# Patient Record
Sex: FEMALE | Race: WHITE | NOT HISPANIC OR LATINO | Employment: OTHER | URBAN - METROPOLITAN AREA
[De-identification: names, ages, dates, MRNs, and addresses within clinical notes are randomized per-mention and may not be internally consistent; named-entity substitution may affect disease eponyms.]

---

## 2021-02-12 ENCOUNTER — APPOINTMENT (OUTPATIENT)
Dept: CT IMAGING | Facility: HOSPITAL | Age: 76
End: 2021-02-12

## 2021-02-12 ENCOUNTER — APPOINTMENT (OUTPATIENT)
Dept: GENERAL RADIOLOGY | Facility: HOSPITAL | Age: 76
End: 2021-02-12

## 2021-02-12 ENCOUNTER — HOSPITAL ENCOUNTER (INPATIENT)
Facility: HOSPITAL | Age: 76
LOS: 6 days | Discharge: NURSING, REHAB OR LONG-TERM CARE | End: 2021-02-18
Attending: EMERGENCY MEDICINE | Admitting: INTERNAL MEDICINE

## 2021-02-12 DIAGNOSIS — S42.296D OTHER CLOSED NONDISPLACED FRACTURE OF PROXIMAL END OF HUMERUS WITH ROUTINE HEALING, UNSPECIFIED LATERALITY, SUBSEQUENT ENCOUNTER: ICD-10-CM

## 2021-02-12 DIAGNOSIS — S02.2XXA CLOSED FRACTURE OF NASAL BONE, INITIAL ENCOUNTER: ICD-10-CM
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
## 2021-02-12 DIAGNOSIS — S42.301A CLOSED FRACTURE OF BOTH HUMERI, INITIAL ENCOUNTER: Primary | ICD-10-CM

## 2021-02-12 DIAGNOSIS — S42.302A CLOSED FRACTURE OF BOTH HUMERI, INITIAL ENCOUNTER: Primary | ICD-10-CM

## 2021-02-12 PROBLEM — M25.552 LEFT HIP PAIN: Status: ACTIVE | Noted: 2021-02-12

## 2021-02-12 PROBLEM — M54.2 NECK PAIN: Status: ACTIVE | Noted: 2021-02-12

## 2021-02-12 PROBLEM — R56.9 SEIZURE: Status: ACTIVE | Noted: 2021-02-12

## 2021-02-12 PROBLEM — M41.25 IDIOPATHIC SCOLIOSIS OF THORACOLUMBAR SPINE: Status: ACTIVE | Noted: 2021-02-12

## 2021-02-12 PROBLEM — I10 ESSENTIAL HYPERTENSION: Status: ACTIVE | Noted: 2021-02-12

## 2021-02-12 PROBLEM — S42.309A HUMERUS FRACTURE: Status: ACTIVE | Noted: 2021-02-12

## 2021-02-12 LAB
ALBUMIN SERPL-MCNC: 4.3 G/DL (ref 3.5–5.2)
ALBUMIN/GLOB SERPL: 1.8 G/DL
ALP SERPL-CCNC: 47 U/L (ref 39–117)
ALT SERPL W P-5'-P-CCNC: 17 U/L (ref 1–33)
ANION GAP SERPL CALCULATED.3IONS-SCNC: 9 MMOL/L (ref 5–15)
AST SERPL-CCNC: 26 U/L (ref 1–32)
BASOPHILS # BLD AUTO: 0.04 10*3/MM3 (ref 0–0.2)
BASOPHILS NFR BLD AUTO: 0.3 % (ref 0–1.5)
BILIRUB SERPL-MCNC: 0.3 MG/DL (ref 0–1.2)
BUN SERPL-MCNC: 11 MG/DL (ref 8–23)
BUN/CREAT SERPL: 22 (ref 7–25)
CALCIUM SPEC-SCNC: 9.2 MG/DL (ref 8.6–10.5)
CHLORIDE SERPL-SCNC: 101 MMOL/L (ref 98–107)
CO2 SERPL-SCNC: 23 MMOL/L (ref 22–29)
CREAT SERPL-MCNC: 0.5 MG/DL (ref 0.57–1)
DEPRECATED RDW RBC AUTO: 40.3 FL (ref 37–54)
EOSINOPHIL # BLD AUTO: 0.03 10*3/MM3 (ref 0–0.4)
EOSINOPHIL NFR BLD AUTO: 0.2 % (ref 0.3–6.2)
ERYTHROCYTE [DISTWIDTH] IN BLOOD BY AUTOMATED COUNT: 12.1 % (ref 12.3–15.4)
GFR SERPL CREATININE-BSD FRML MDRD: 121 ML/MIN/1.73
GLOBULIN UR ELPH-MCNC: 2.4 GM/DL
GLUCOSE SERPL-MCNC: 118 MG/DL (ref 65–99)
HCT VFR BLD AUTO: 35.5 % (ref 34–46.6)
HGB BLD-MCNC: 12.1 G/DL (ref 12–15.9)
IMM GRANULOCYTES # BLD AUTO: 0.06 10*3/MM3 (ref 0–0.05)
IMM GRANULOCYTES NFR BLD AUTO: 0.4 % (ref 0–0.5)
LYMPHOCYTES # BLD AUTO: 1.03 10*3/MM3 (ref 0.7–3.1)
LYMPHOCYTES NFR BLD AUTO: 6.5 % (ref 19.6–45.3)
MCH RBC QN AUTO: 31 PG (ref 26.6–33)
MCHC RBC AUTO-ENTMCNC: 34.1 G/DL (ref 31.5–35.7)
MCV RBC AUTO: 91 FL (ref 79–97)
MONOCYTES # BLD AUTO: 0.84 10*3/MM3 (ref 0.1–0.9)
MONOCYTES NFR BLD AUTO: 5.3 % (ref 5–12)
NEUTROPHILS NFR BLD AUTO: 13.84 10*3/MM3 (ref 1.7–7)
NEUTROPHILS NFR BLD AUTO: 87.3 % (ref 42.7–76)
NRBC BLD AUTO-RTO: 0 /100 WBC (ref 0–0.2)
PLATELET # BLD AUTO: 248 10*3/MM3 (ref 140–450)
PMV BLD AUTO: 9.6 FL (ref 6–12)
POTASSIUM SERPL-SCNC: 4.2 MMOL/L (ref 3.5–5.2)
PROT SERPL-MCNC: 6.7 G/DL (ref 6–8.5)
RBC # BLD AUTO: 3.9 10*6/MM3 (ref 3.77–5.28)
SODIUM SERPL-SCNC: 133 MMOL/L (ref 136–145)
WBC # BLD AUTO: 15.84 10*3/MM3 (ref 3.4–10.8)

## 2021-02-12 PROCEDURE — 73030 X-RAY EXAM OF SHOULDER: CPT

## 2021-02-12 PROCEDURE — 87636 SARSCOV2 & INF A&B AMP PRB: CPT | Performed by: INTERNAL MEDICINE

## 2021-02-12 PROCEDURE — 99222 1ST HOSP IP/OBS MODERATE 55: CPT | Performed by: INTERNAL MEDICINE

## 2021-02-12 PROCEDURE — 72125 CT NECK SPINE W/O DYE: CPT

## 2021-02-12 PROCEDURE — 25010000002 HYDROMORPHONE PER 4 MG: Performed by: EMERGENCY MEDICINE

## 2021-02-12 PROCEDURE — 85025 COMPLETE CBC W/AUTO DIFF WBC: CPT | Performed by: EMERGENCY MEDICINE

## 2021-02-12 PROCEDURE — 80053 COMPREHEN METABOLIC PANEL: CPT | Performed by: EMERGENCY MEDICINE

## 2021-02-12 PROCEDURE — 25010000002 ONDANSETRON PER 1 MG: Performed by: EMERGENCY MEDICINE

## 2021-02-12 PROCEDURE — 73060 X-RAY EXAM OF HUMERUS: CPT

## 2021-02-12 PROCEDURE — 70450 CT HEAD/BRAIN W/O DYE: CPT

## 2021-02-12 PROCEDURE — 99285 EMERGENCY DEPT VISIT HI MDM: CPT

## 2021-02-12 RX ORDER — HYDROCODONE BITARTRATE AND ACETAMINOPHEN 5; 325 MG/1; MG/1
1 TABLET ORAL EVERY 4 HOURS PRN
Status: DISCONTINUED | OUTPATIENT
Start: 2021-02-12 | End: 2021-02-14

## 2021-02-12 RX ORDER — HYDROMORPHONE HYDROCHLORIDE 1 MG/ML
0.25 INJECTION, SOLUTION INTRAMUSCULAR; INTRAVENOUS; SUBCUTANEOUS
Status: DISCONTINUED | OUTPATIENT
Start: 2021-02-12 | End: 2021-02-14

## 2021-02-12 RX ORDER — LEVETIRACETAM 750 MG/1
1500 TABLET ORAL EVERY MORNING
Status: DISCONTINUED | OUTPATIENT
Start: 2021-02-13 | End: 2021-02-18 | Stop reason: HOSPADM

## 2021-02-12 RX ORDER — HYDROMORPHONE HYDROCHLORIDE 1 MG/ML
0.5 INJECTION, SOLUTION INTRAMUSCULAR; INTRAVENOUS; SUBCUTANEOUS ONCE
Status: COMPLETED | OUTPATIENT
Start: 2021-02-12 | End: 2021-02-12

## 2021-02-12 RX ORDER — ONDANSETRON 2 MG/ML
4 INJECTION INTRAMUSCULAR; INTRAVENOUS ONCE
Status: COMPLETED | OUTPATIENT
Start: 2021-02-12 | End: 2021-02-12

## 2021-02-12 RX ORDER — HYDROMORPHONE HYDROCHLORIDE 1 MG/ML
0.25 INJECTION, SOLUTION INTRAMUSCULAR; INTRAVENOUS; SUBCUTANEOUS ONCE
Status: COMPLETED | OUTPATIENT
Start: 2021-02-12 | End: 2021-02-12

## 2021-02-12 RX ORDER — LEVETIRACETAM 500 MG/1
1000 TABLET ORAL NIGHTLY
Status: DISCONTINUED | OUTPATIENT
Start: 2021-02-12 | End: 2021-02-18 | Stop reason: HOSPADM

## 2021-02-12 RX ADMIN — MUPIROCIN: 20 OINTMENT TOPICAL at 21:45

## 2021-02-12 RX ADMIN — LEVETIRACETAM 1000 MG: 500 TABLET, FILM COATED ORAL at 22:43

## 2021-02-12 RX ADMIN — HYDROMORPHONE HYDROCHLORIDE 0.5 MG: 1 INJECTION, SOLUTION INTRAMUSCULAR; INTRAVENOUS; SUBCUTANEOUS at 19:52

## 2021-02-12 RX ADMIN — HYDROCODONE BITARTRATE AND ACETAMINOPHEN 1 TABLET: 5; 325 TABLET ORAL at 22:47

## 2021-02-12 RX ADMIN — HYDROMORPHONE HYDROCHLORIDE 0.25 MG: 1 INJECTION, SOLUTION INTRAMUSCULAR; INTRAVENOUS; SUBCUTANEOUS at 18:02

## 2021-02-12 RX ADMIN — ONDANSETRON 4 MG: 2 INJECTION INTRAMUSCULAR; INTRAVENOUS at 18:02

## 2021-02-12 NOTE — ED PROVIDER NOTES
EMERGENCY DEPARTMENT ENCOUNTER    Room Number:  Room/bed info not found  Date of encounter:  2/12/2021  PCP: No primary care provider on file.  Historian: Patient      HPI:  Chief Complaint: Head injury, bilateral shoulder injury        Context: Keely Haider is a 75 y.o. female who presents to the ED c/o slip and fall onto outstretched hands.  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  She slipped and fell forward breaking her fall with her hands extended.  She struck her face on the ground and feels significant pain in both shoulders left much greater than right.  She rates her shoulder pain at 10 out of 10 on the pain scale.  She had no loss of consciousness.  She denies vertigo.  She has not taken any medications for symptom relief prior to coming emergency department.  Patient is uncertain about taking blood thinners.      PAST MEDICAL HISTORY  Active Ambulatory Problems     Diagnosis Date Noted   • No Active Ambulatory Problems     Resolved Ambulatory Problems     Diagnosis Date Noted   • No Resolved Ambulatory Problems     No Additional Past Medical History         PAST SURGICAL HISTORY  No past surgical history on file.      FAMILY HISTORY  No family history on file.      SOCIAL HISTORY         ALLERGIES  Patient has no allergy information on record.        REVIEW OF SYSTEMS  Review of Systems     All systems reviewed and negative except for those discussed in HPI.       PHYSICAL EXAM    I have reviewed the triage vital signs and nursing notes.    ED Triage Vitals   Temp Pulse Resp BP SpO2   -- -- -- -- --      Temp src Heart Rate Source Patient Position BP Location FiO2 (%)   -- -- -- -- --       Physical Exam  GENERAL:   Appears uncomfortable as she arrives by EMS where I greet her.  HENT: Nares patent.  Small facial contusions and abrasions right superior periorbital region, mid forehead.  No craniofacial instability.  EYES: No scleral icterus.   Bert  CV: Regular rhythm, regular rate.  No murmurs gallops rubs  RESPIRATORY: Normal effort.  No audible wheezes, rales or rhonchi.  Clear to auscultation  ABDOMEN: Soft, nontender.  MUSCULOSKELETAL: Marked tenderness to palpation in the proximal left humerus.  The patient does not tolerate any range of motion of this shoulder.  Pain extends to mid humerus.  NEURO: Alert, moves all extremities, follows commands.  SKIN: Warm, dry, no rash visualized.        LAB RESULTS  No results found for this or any previous visit (from the past 24 hour(s)).    If labs were ordered, I independently reviewed the results.        RADIOLOGY  No Radiology Exams Resulted Within Past 24 Hours    I ordered and reviewed the above noted radiographic studies.    I viewed images of bilat shoulders which showed bilat fractures  per my independent interpretation.  See radiologist's dictation for official interpretation.        PROCEDURES    Procedures    No orders to display       MEDICATIONS GIVEN IN ER    Medications - No data to display      PROGRESS, DATA ANALYSIS, CONSULTS, AND MEDICAL DECISION MAKING    All labs have been independently reviewed by me.  All radiology studies have been reviewed by me and the radiologist dictating the report.   EKG's have been independently viewed and interpreted by me.          ED Course as of Feb 13 1402   Fri Feb 12, 2021   2113 I treated the patient with hydrocodone x2 doses.  I have asked for bilateral slings to be placed.  I spoke with Dr. Martines, on-call orthopedics.  He agrees with admission to the hospitalist service for further pain treatment.    Have paged the hospitalist.    [MS]      ED Course User Index  [MS] Cristobal Silva MD             AS OF 17:46 EST VITALS:    BP -    HR -    TEMP -    O2 SATS -          DIAGNOSIS  Final diagnoses:   Closed fracture of both humeri, initial encounter   Closed fracture of nasal bone, initial encounter   Fall due to slipping on ice or snow, initial  encounter         DISPOSITION  Admission           Cristobal Silva MD  02/13/21 0669

## 2021-02-13 ENCOUNTER — ANESTHESIA EVENT (OUTPATIENT)
Dept: PERIOP | Facility: HOSPITAL | Age: 76
End: 2021-02-13

## 2021-02-13 ENCOUNTER — APPOINTMENT (OUTPATIENT)
Dept: CT IMAGING | Facility: HOSPITAL | Age: 76
End: 2021-02-13

## 2021-02-13 LAB
BACTERIA UR QL AUTO: ABNORMAL /HPF
BILIRUB UR QL STRIP: NEGATIVE
CLARITY UR: ABNORMAL
COLOR UR: YELLOW
FLUAV RNA RESP QL NAA+PROBE: NOT DETECTED
FLUBV RNA RESP QL NAA+PROBE: NOT DETECTED
GLUCOSE UR STRIP-MCNC: NEGATIVE MG/DL
HGB UR QL STRIP.AUTO: ABNORMAL
HYALINE CASTS UR QL AUTO: ABNORMAL /LPF
KETONES UR QL STRIP: NEGATIVE
LEUKOCYTE ESTERASE UR QL STRIP.AUTO: ABNORMAL
NITRITE UR QL STRIP: POSITIVE
PH UR STRIP.AUTO: 7.5 [PH] (ref 5–8)
PROT UR QL STRIP: NEGATIVE
RBC # UR: ABNORMAL /HPF
REF LAB TEST METHOD: ABNORMAL
SARS-COV-2 RNA RESP QL NAA+PROBE: NOT DETECTED
SP GR UR STRIP: 1.01 (ref 1–1.03)
SQUAMOUS #/AREA URNS HPF: ABNORMAL /HPF
UROBILINOGEN UR QL STRIP: ABNORMAL
WBC UR QL AUTO: ABNORMAL /HPF

## 2021-02-13 PROCEDURE — 99232 SBSQ HOSP IP/OBS MODERATE 35: CPT | Performed by: FAMILY MEDICINE

## 2021-02-13 PROCEDURE — 87186 SC STD MICRODIL/AGAR DIL: CPT | Performed by: FAMILY MEDICINE

## 2021-02-13 PROCEDURE — 94799 UNLISTED PULMONARY SVC/PX: CPT

## 2021-02-13 PROCEDURE — 87086 URINE CULTURE/COLONY COUNT: CPT | Performed by: FAMILY MEDICINE

## 2021-02-13 PROCEDURE — 87077 CULTURE AEROBIC IDENTIFY: CPT | Performed by: FAMILY MEDICINE

## 2021-02-13 PROCEDURE — 25010000002 HYDROMORPHONE PER 4 MG: Performed by: INTERNAL MEDICINE

## 2021-02-13 PROCEDURE — 93005 ELECTROCARDIOGRAM TRACING: CPT | Performed by: INTERNAL MEDICINE

## 2021-02-13 PROCEDURE — 81001 URINALYSIS AUTO W/SCOPE: CPT | Performed by: FAMILY MEDICINE

## 2021-02-13 PROCEDURE — 93010 ELECTROCARDIOGRAM REPORT: CPT | Performed by: INTERNAL MEDICINE

## 2021-02-13 PROCEDURE — G0378 HOSPITAL OBSERVATION PER HR: HCPCS

## 2021-02-13 PROCEDURE — 73200 CT UPPER EXTREMITY W/O DYE: CPT

## 2021-02-13 RX ORDER — LEVETIRACETAM 500 MG/1
1000 TABLET ORAL DAILY
COMMUNITY

## 2021-02-13 RX ORDER — GABAPENTIN 300 MG/1
300 CAPSULE ORAL
Status: DISCONTINUED | OUTPATIENT
Start: 2021-02-14 | End: 2021-02-18 | Stop reason: HOSPADM

## 2021-02-13 RX ORDER — GABAPENTIN 300 MG/1
300 CAPSULE ORAL
Status: ON HOLD | COMMUNITY
End: 2021-02-18 | Stop reason: SDUPTHER

## 2021-02-13 RX ORDER — POLYETHYLENE GLYCOL 3350 17 G/17G
17 POWDER, FOR SOLUTION ORAL DAILY
Status: DISCONTINUED | OUTPATIENT
Start: 2021-02-13 | End: 2021-02-18 | Stop reason: HOSPADM

## 2021-02-13 RX ORDER — LEVETIRACETAM 500 MG/1
1500 TABLET ORAL NIGHTLY
COMMUNITY

## 2021-02-13 RX ORDER — DEXTROSE, SODIUM CHLORIDE, AND POTASSIUM CHLORIDE 5; .45; .15 G/100ML; G/100ML; G/100ML
50 INJECTION INTRAVENOUS CONTINUOUS
Status: DISCONTINUED | OUTPATIENT
Start: 2021-02-13 | End: 2021-02-14

## 2021-02-13 RX ORDER — ACETAMINOPHEN 325 MG/1
650 TABLET ORAL EVERY 6 HOURS PRN
Status: DISCONTINUED | OUTPATIENT
Start: 2021-02-13 | End: 2021-02-14 | Stop reason: SDUPTHER

## 2021-02-13 RX ORDER — AMLODIPINE BESYLATE 10 MG/1
10 TABLET ORAL DAILY
COMMUNITY

## 2021-02-13 RX ORDER — AMLODIPINE BESYLATE 10 MG/1
10 TABLET ORAL DAILY
Status: DISCONTINUED | OUTPATIENT
Start: 2021-02-13 | End: 2021-02-18 | Stop reason: HOSPADM

## 2021-02-13 RX ORDER — ONDANSETRON 2 MG/ML
4 INJECTION INTRAMUSCULAR; INTRAVENOUS EVERY 6 HOURS PRN
Status: DISCONTINUED | OUTPATIENT
Start: 2021-02-13 | End: 2021-02-14

## 2021-02-13 RX ORDER — METOPROLOL TARTRATE 5 MG/5ML
2.5 INJECTION INTRAVENOUS ONCE
Status: COMPLETED | OUTPATIENT
Start: 2021-02-13 | End: 2021-02-13

## 2021-02-13 RX ORDER — LISINOPRIL 20 MG/1
20 TABLET ORAL
Status: DISCONTINUED | OUTPATIENT
Start: 2021-02-14 | End: 2021-02-14

## 2021-02-13 RX ADMIN — LEVETIRACETAM 1500 MG: 750 TABLET, FILM COATED ORAL at 10:14

## 2021-02-13 RX ADMIN — POTASSIUM CHLORIDE, DEXTROSE MONOHYDRATE AND SODIUM CHLORIDE 50 ML/HR: 150; 5; 450 INJECTION, SOLUTION INTRAVENOUS at 22:17

## 2021-02-13 RX ADMIN — POLYETHYLENE GLYCOL 3350 17 G: 17 POWDER, FOR SOLUTION ORAL at 10:14

## 2021-02-13 RX ADMIN — HYDROCODONE BITARTRATE AND ACETAMINOPHEN 1 TABLET: 5; 325 TABLET ORAL at 16:35

## 2021-02-13 RX ADMIN — METOPROLOL TARTRATE 2.5 MG: 5 INJECTION INTRAVENOUS at 20:19

## 2021-02-13 RX ADMIN — HYDROMORPHONE HYDROCHLORIDE 0.25 MG: 1 INJECTION, SOLUTION INTRAMUSCULAR; INTRAVENOUS; SUBCUTANEOUS at 05:43

## 2021-02-13 RX ADMIN — AMLODIPINE BESYLATE 10 MG: 10 TABLET ORAL at 16:45

## 2021-02-13 RX ADMIN — POTASSIUM CHLORIDE, DEXTROSE MONOHYDRATE AND SODIUM CHLORIDE 50 ML/HR: 150; 5; 450 INJECTION, SOLUTION INTRAVENOUS at 01:25

## 2021-02-13 RX ADMIN — HYDROMORPHONE HYDROCHLORIDE 0.25 MG: 1 INJECTION, SOLUTION INTRAMUSCULAR; INTRAVENOUS; SUBCUTANEOUS at 01:19

## 2021-02-13 RX ADMIN — LEVETIRACETAM 1000 MG: 500 TABLET, FILM COATED ORAL at 20:19

## 2021-02-13 RX ADMIN — HYDROCODONE BITARTRATE AND ACETAMINOPHEN 1 TABLET: 5; 325 TABLET ORAL at 10:14

## 2021-02-13 NOTE — H&P
[facility] Medicine Services  HISTORY AND PHYSICAL    Patient Name: Keely Haider  : 1945  MRN: [id number]  Primary Care Physician: Provider, No Known    Subjective   Subjective     Chief Complaint:fall with broken arms    HPI:  Keely Haider is a 75 y.o. female with  has a past medical history of Hypertension, Osteoarthritis of left hip, Osteoporosis, Scoliosis, and Seizures (CMS/HCC).  Who went to visit her son's birthdays on  and delivered [employer] cards for is her grandchildren tonight.  Patient could not get him to answer the front door so she went to the basement.  Where she fell and to prevent hitting her head caught herself with her arms and broke both humeri.  She is being admitted for pain control and surgical evaluation.  Patient also had gel to her neck as well as contusion of her right eye.  Otherwise she has no specific complaints other than significant worry for her .  Review of Systems   Patient reports chronic left hip pain that makes it difficult for her to walk, chronic scoliosis, she denies chest pain heaviness tightness, shortness of breath fever chills or Covid symptoms.  Otherwise complete 10-system ROS is negative except as mentioned in the HPI.    Personal History     Past Medical History:   Diagnosis Date   • Hypertension    • Seizures (CMS/HCC)        No past surgical history on file.    Family History: family history includes Lung cancer in her father.     Social History:  reports that she has never smoked. She does not have any smokeless tobacco history on file. She reports that she does not drink alcohol or use drugs.  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  She just recently moved back from [place] to [place] where she is living in a patio home with her third  who is 10 years older than she is.  He has some cognitive  impairment as well as he does not drive therefore she has significant concerns about leaving him alone.    Medications: Currently unknown although she does report she takes Keppra she reports that she has seen Dr. SWENSON for this in the past however I do not see it in the computer.        Allergies   Allergen Reactions   • Sulfa Antibiotics Unknown - Low Severity       Objective   Objective     Vital Signs:   Temp:  [98 °F (36.7 °C)] 98 °F (36.7 °C)  Heart Rate:  [67-72] 72  Resp:  [16] 16  BP: (146-173)/(68-74) 147/68  Stable on room air  Patient is alert and talkative in no distress at rest, she is pleasant and quite talkative   She can now move her hands and on her arms, she has a contusion on her right orbital area neck is without mass or JVD  Heart is Reg wo murmur  Lungs are clear wo wheeze or crackle  Abd is soft without HSM or mass, not tender or distended  Her legs are strong, she has chronic osteoarthritic changes  Skin is without rash  Neurologic exam is nonfocal   Mood is appropriate      Result Review:  I have personally reviewed the results from the time of admission and agree with these findings:  [x]  Laboratory  [x]  Radiology  [x]  EKG/Telemetry   []  Pathology  []  Old records  []  Other:  Most notable findings include: Bilateral humeral fractures, elevated white count, otherwise normal, no EKG done yet    LAB RESULTS:      Lab 02/12/21 2036   WBC 15.84*   HEMOGLOBIN 12.1   HEMATOCRIT 35.5   PLATELETS 248   NEUTROS ABS 13.84*   IMMATURE GRANS (ABS) 0.06*   LYMPHS ABS 1.03   MONOS ABS 0.84   EOS ABS 0.03   MCV 91.0         Lab 02/12/21 2036   SODIUM 133*   POTASSIUM 4.2   CHLORIDE 101   CO2 23.0   ANION GAP 9.0   BUN 11   CREATININE 0.50*   GLUCOSE 118*   CALCIUM 9.2         Lab 02/12/21 2036   TOTAL PROTEIN 6.7   ALBUMIN 4.30   GLOBULIN 2.4   ALT (SGPT) 17   AST (SGOT) 26   BILIRUBIN 0.3   ALK PHOS 47                     Brief Urine Lab Results     None        Microbiology Results (last 10 days)      ** No results found for the last 240 hours. **          Xr Shoulder 2+ View Right    Result Date: 2/12/2021  CR Shoulder Comp Min 2 Vws RT INDICATION: Pain in the shoulder and humerus. Fell down the steps today. COMPARISON: None available. FINDINGS: 3 views of the right shoulder.  The bones are osteoporotic. There is a comminuted impacted fracture that appears to be at the level of the surgical neck of the humerus. Impaction on the order of at least 2 cm. Mild subluxation but no distinct shoulder dislocation. Degenerative changes of the AC joint and glenohumeral joint with probable undersurface spurring of the acromion versus underlying intra-articular loose body. Bony glenoid appears intact. No foreign body.     Impression: 1. Comminuted and impacted fracture of the proximal humerus and humeral head. Signer Name: Huber Lynch MD  Signed: 2/12/2021 7:29 PM  Workstation Name: [facility]  [facility]    Xr Humerus Left    Result Date: 2/12/2021  EXAMINATION: XR LEFT HUMERUS - 02/12/2021  INDICATION: Fall. Right and left shoulder pain.  COMPARISON: None.  FINDINGS: There is a surgical neck fracture of the proximal humerus with mild impaction, and avulsion of the greater tuberosity. No underlying pathologic lesion is seen. The bones appear generally osteopenic. Remaining bony structures appear to be intact.      Impression: Acute surgical neck fracture of the left proximal humerus as described.  DICTATED:   02/12/2021 EDITED/ls :   02/12/2021        Ct Head Without Contrast    Result Date: 2/12/2021  CT Head WO HISTORY: Status post fall today. Complains of laceration to nose and above right eye. TECHNIQUE: Axial unenhanced head CT. Radiation dose reduction techniques included automated exposure control or exposure modulation based on body size. Count of known CT and cardiac nuc med studies performed in previous 12 months: 0. Time of scan: 1908 hours COMPARISON: None. FINDINGS: No  intracranial hemorrhage, mass, or infarct. No hydrocephalus or extra-axial fluid collection. There are senescent changes, including volume loss and nonspecific white matter change, but no acute abnormality is seen. The skull base, calvarium, and extracranial soft tissues are normal. Small amount of soft tissue gas right nasal soft tissues may reflect laceration.     Impression: Senescent changes without acute abnormality. Suspected small right nasal soft tissue laceration. Signer Name: GAY Bay MD  Signed: 2/12/2021 7:25 PM  Workstation Name: [facility]    Ct Cervical Spine Without Contrast    Result Date: 2/12/2021  CT Spine Cervical WO INDICATION: Neck pain after fall today. Pain across neck and shoulders. TECHNIQUE: CT of the cervical spine without IV contrast. Coronal and sagittal reconstructions were obtained.  Radiation dose reduction techniques included automated exposure control or exposure modulation based on body size. Count of known CT and cardiac nuc med studies performed in previous 12 months: 0. COMPARISON: None available. FINDINGS: Moderately advanced arthritic changes atlantoaxial joint with erosions. Moderately advanced C5-C6 and C6-7 degenerative disc disease. No acute fracture or malalignment. Mild C5-C6 spinal stenosis and right foraminal stenosis. Mild C6-C7 spinal stenosis and right greater than left foraminal stenosis. Paravertebral soft tissues unremarkable.     Impression: No acute traumatic findings. Moderately advanced degenerative disc disease with spinal and foraminal stenosis as detailed. Signer Name: GAY Bay MD  Signed: 2/12/2021 7:28 PM  Workstation Name: [facility]           Current COVID Risks are:  [] Fever []  Cough [] Shortness of breath [] Change in taste or smell  [] Exposure to COVID patient  [] High risk facility   [x]  NONE Covid test negative 02/13/21    Assessment/Plan    Assessment / Plan       Humerus fracture-bilateral    Idiopathic scoliosis of thoracolumbar spine    Left hip pain    Essential hypertension    Seizure (CMS/HCC)    Neck pain      Assessment & Plan:  75-year-old woman with past medical history only significant for hypertension, osteoarthritis osteoporosis, scoliosis, chronic left hip pain as well as a seizure disorder who had a fall on the ice tonight and broke both of her humeri.    Bilateral humeral fracture  -ED spoke with orthopedics who will evaluate the patient in the morning for possible surgery  -Splints are available in the ED room and hopefully will be applied  -PT OT consults  -Patient admits that she is not safe at home by her self and does not have sufficient support at home  -EKG for preop clearance    Seizure disorder  -Continue Keppra, need to review home medications      Hypertension-hold meds for now hopefully pain control will help    Chronic osteoarthritis and osteoporosis with left hip pain may have contributed to her fall and fractures--maximize treatment 1 once we know what she is currently taking.      DVT prophylaxis: On hold for possible surgery    CODE STATUS: Full code    Admission Status: INPATIENT status due to need for pain control, neurologic stability.  I feel patient’s risk for adverse outcomes and need for care warrant INPATIENT evaluation and I predict the patient’s care encounter to likely last beyond 2 midnights.        Electronically signed by Sakshi Lane MD 02/12/21 21:42 EST

## 2021-02-13 NOTE — PROGRESS NOTES
[facility] Medicine Services  PROGRESS NOTE    Patient Name: Keely Haider  : 1945  MRN: [id number]    Date of Admission: 2021  Primary Care Physician: Provider, No Known    Subjective   Subjective     CC:  F/U bilateral humerus fractures     HPI:  Pt seen and examined. RN notes reviewed. No acute events overnight. Pt doing well this morning. Has some pain in her shoulders. Pt is very pleasant.     ROS:  Gen- No fevers, chills  CV- No chest pain, palpitations  Resp- No cough, dyspnea  GI- No N/V/D, abd pain    Objective   Objective     Vital Signs:   Temp:  [98 °F (36.7 °C)-98.4 °F (36.9 °C)] 98.4 °F (36.9 °C)  Heart Rate:  [64-80] 76  Resp:  [16-18] 18  BP: (143-173)/(57-90) 146/58        Physical Exam:  Constitutional: No acute distress, awake, alert  HENT: NC, mucous membranes moist, bandage over R eye   Respiratory: Clear to auscultation bilaterally, respiratory effort normal   Cardiovascular: RRR, no murmurs, rubs, or gallops  Gastrointestinal: Positive bowel sounds, soft, nontender, nondistended  Musculoskeletal: No bilateral ankle edema, B/L UE without obvious deformity, sensation/strength intact   Psychiatric: Appropriate affect, cooperative  Neurologic: Oriented x 3, no focal deficits   Skin: No rashes    Results Reviewed:  Results from last 7 days   Lab Units 21   WBC 10*3/mm3 15.84*   HEMOGLOBIN g/dL 12.1   HEMATOCRIT % 35.5   PLATELETS 10*3/mm3 248     Results from last 7 days   Lab Units 21   SODIUM mmol/L 133*   POTASSIUM mmol/L 4.2   CHLORIDE mmol/L 101   CO2 mmol/L 23.0   BUN mg/dL 11   CREATININE mg/dL 0.50*   GLUCOSE mg/dL 118*   CALCIUM mg/dL 9.2   ALT (SGPT) U/L 17   AST (SGOT) U/L 26     Estimated Creatinine Clearance: 51.9 mL/min (A) (by C-G formula based on SCr of 0.5 mg/dL (L)).    Microbiology Results Abnormal     Procedure Component Value - Date/Time    COVID PRE-OP / PRE-PROCEDURE SCREENING ORDER (NO ISOLATION) - Swab,  Nasopharynx [[id number]]  (Normal) Collected: 02/12/21 2352    Lab Status: Final result Specimen: Swab from Nasopharynx Updated: 02/13/21 0404    Narrative:      The following orders were created for panel order COVID PRE-OP / PRE-PROCEDURE SCREENING ORDER (NO ISOLATION) - Swab, Nasopharynx.  Procedure                               Abnormality         Status                     ---------                               -----------         ------                     COVID-19 and FLU A/B PCR...[[id number]]  Normal              Final result                 Please view results for these tests on the individual orders.    COVID-19 and FLU A/B PCR - Swab, Nasopharynx [[id number]]  (Normal) Collected: 02/12/21 2352    Lab Status: Final result Specimen: Swab from Nasopharynx Updated: 02/13/21 0404     COVID19 Not Detected     Influenza A PCR Not Detected     Influenza B PCR Not Detected    Narrative:      Fact sheet for providers: https://www.fda.gov/media/280888/download    Fact sheet for patients: https://www.fda.gov/media/528298/download    Test performed by PCR.          Imaging Results (Last 24 Hours)     Procedure Component Value Units Date/Time    CT Upper Extremity Left Without Contrast [[id number]] Resulted: 02/13/21 0917     Updated: 02/13/21 0921    CT Upper Extremity Right Without Contrast [[id number]] Resulted: 02/13/21 0917     Updated: 02/13/21 0921    XR Humerus Left [[id number]] Collected: 02/12/21 1913     Updated: 02/12/21 2209    Narrative:      EXAMINATION: XR LEFT HUMERUS - 02/12/2021     INDICATION: Fall. Right and left shoulder pain.     COMPARISON: None.     FINDINGS: There is a surgical neck fracture of the proximal humerus with  mild impaction, and avulsion of the greater tuberosity. No underlying  pathologic lesion is seen. The bones appear generally osteopenic.  Remaining bony structures appear to be intact.       Impression:      Acute surgical neck fracture of the left proximal humerus  as  described.     DICTATED:   02/12/2021  EDITED/ls :   02/12/2021         This report was finalized on 2/12/2021 10:06 PM by Dr. Eagle Davis MD.       XR Shoulder 2+ View Right [[id number]] Collected: 02/12/21 1929     Updated: 02/12/21 1931    Narrative:      CR Shoulder Comp Min 2 Vws RT    INDICATION:   Pain in the shoulder and humerus. Fell down the steps today.    COMPARISON:   None available.    FINDINGS:   3 views of the right shoulder.  The bones are osteoporotic. There is a comminuted impacted fracture that appears to be at the level of the surgical neck of the humerus. Impaction on the order of at least 2 cm. Mild subluxation but no distinct shoulder  dislocation. Degenerative changes of the AC joint and glenohumeral joint with probable undersurface spurring of the acromion versus underlying intra-articular loose body. Bony glenoid appears intact. No foreign body.      Impression:        1. Comminuted and impacted fracture of the proximal humerus and humeral head.    Signer Name: Huber Lynch MD   Signed: 2/12/2021 7:29 PM   Workstation Name: [facility]    [facility]    CT Cervical Spine Without Contrast [[id number]] Collected: 02/12/21 1928     Updated: 02/12/21 1930    Narrative:      CT Spine Cervical WO    INDICATION:   Neck pain after fall today. Pain across neck and shoulders.    TECHNIQUE:   CT of the cervical spine without IV contrast. Coronal and sagittal reconstructions were obtained.  Radiation dose reduction techniques included automated exposure control or exposure modulation based on body size. Count of known CT and cardiac nuc med  studies performed in previous 12 months: 0.     COMPARISON:  None available.    FINDINGS:  Moderately advanced arthritic changes atlantoaxial joint with erosions. Moderately advanced C5-C6 and C6-7 degenerative disc disease. No acute fracture or malalignment. Mild C5-C6 spinal stenosis and right foraminal stenosis. Mild C6-C7 spinal  stenosis  and right greater than left foraminal stenosis. Paravertebral soft tissues unremarkable.      Impression:      No acute traumatic findings. Moderately advanced degenerative disc disease with spinal and foraminal stenosis as detailed.    Signer Name: GAY Bay MD   Signed: 2/12/2021 7:28 PM   Workstation Name: [facility]    Radiology [facility]    CT Head Without Contrast [[id number]] Collected: 02/12/21 1925     Updated: 02/12/21 1927    Narrative:      CT Head WO    HISTORY:   Status post fall today. Complains of laceration to nose and above right eye.    TECHNIQUE:   Axial unenhanced head CT. Radiation dose reduction techniques included automated exposure control or exposure modulation based on body size. Count of known CT and cardiac nuc med studies performed in previous 12 months: 0.     Time of scan: 1908 hours    COMPARISON:   None.    FINDINGS:   No intracranial hemorrhage, mass, or infarct. No hydrocephalus or extra-axial fluid collection. There are senescent changes, including volume loss and nonspecific white matter change, but no acute abnormality is seen. The skull base, calvarium, and  extracranial soft tissues are normal. Small amount of soft tissue gas right nasal soft tissues may reflect laceration.      Impression:      Senescent changes without acute abnormality.    Suspected small right nasal soft tissue laceration.          Signer Name: GAY Bay MD   Signed: 2/12/2021 7:25 PM   Workstation Name: [facility]    [facility]               I have reviewed the medications:  Scheduled Meds:levETIRAcetam, 1,000 mg, Oral, Nightly  levETIRAcetam, 1,500 mg, Oral, QAM  polyethylene glycol, 17 g, Oral, Daily      Continuous Infusions:dextrose 5 % and sodium chloride 0.45 % with KCl 20 mEq/L, 50 mL/hr, Last Rate: 50 mL/hr (02/13/21 0125)      PRN Meds:.HYDROcodone-acetaminophen  •  HYDROmorphone    Assessment/Plan   Assessment & Plan      Active Hospital Problems    Diagnosis  POA   • Humerus fracture-bilateral [S42.309A]  Yes   • Idiopathic scoliosis of thoracolumbar spine [M41.25]  Yes   • Left hip pain [M25.552]  Yes   • Essential hypertension [I10]  Yes   • Seizure (CMS/HCC) [R56.9]  Yes   • Neck pain [M54.2]  Yes      Resolved Hospital Problems   No resolved problems to display.        Brief Hospital Course to date:  Keely Haider is a 75 y.o. female with past medical history only significant for hypertension, osteoarthritis osteoporosis, scoliosis, chronic left hip pain as well as a seizure disorder who had a fall on the ice and broke both of her humeri.     Bilateral humeral fracture  -Dr. Loza following. NPO at midnight with possible intervention tomorrow  -Plan for B/L simple slings today for comfort  -B/L CT UE with 3-D reconstruction today     Seizure disorder  -Continue Keppra     Hypertension  -BP stable    Hyponatremia  -mild, monitor with BMP in AM    Leukocytosis  -likely reactive from Fx  -Obtain UA  -CBC in AM     Chronic osteoarthritis and osteoporosis         DVT prophylaxis: Mechanical due to surgery    Disposition: I expect the patient to be discharged TBD.    CODE STATUS:   Code Status and Medical Interventions:   Ordered at: 02/12/21 2159     Code Status:    CPR     Medical Interventions (Level of Support Prior to Arrest):    Full       Trang Jean DO  02/13/21

## 2021-02-13 NOTE — PLAN OF CARE
Goal Outcome Evaluation:  Patient arrived to floor around 10:30 with bilateral humerus fractures. VSS throughout shift. Pt placed on 2L oxygen via nasal cannula after Dilaudid administration at 1:30. Pt rested well throughout night otherwise. Pt voiding on bedpan. LBM 2/14. Pt alert and oriented X 4. NPO @ midnight. Positioning and self care deficits due to nature of injury.

## 2021-02-13 NOTE — PROGRESS NOTES
See prior note for detailed regarding LEFT PROXIMAL HUMERUS FRACTURE      In regards to her RIGHT PROXIMAL HUMERUS FRACTURE, I have reviewed CT scan and patient will be treated in closed fashion with sling immobnilization

## 2021-02-13 NOTE — PLAN OF CARE
Goal Outcome Evaluation:  Plan of Care Reviewed With: patient  Progress: no change   Pt alert and oriented; voiding well; urinalysis sent to lab; norco given for pain x2; scds worn; plan is for surgery in am-npo after midnight. BP is a little elevated-Dr Jean notified-resumed home norvasc. Bilateral arms in slings for comfort.

## 2021-02-13 NOTE — PROGRESS NOTES
I have evaluatred the LEFT shoulder CT--- images for the right are still pehnding      Patient with displaced 3 part proximal humerus fracture      Will proceed with ORIF Left  proximal humerus fracture via locking plate fixation    I have talked with patients daughter in law and described goals risks benefits and recovery.      Procedure: ORIF Left proximal humerus fracture  Equipment: MDS locking plate, Large C arm  Anesthesia: General with IS block  Position: Beachchair      NPO p mn  Hold chemical anticoagulation after 6 pm tonight

## 2021-02-13 NOTE — CONSULTS
"   Orthopedic Consult      Patient: Keely Haider    Date of Admission: 2/12/2021  5:50 PM    YOB: 1945    Medical Record Number: [id number]    Attending Physician: Trang Jean DO    Consulting Physician: Timoteo Loza MD      Chief Complaints: Humerus fracture [S42.309A]  Humerus fracture [S42.309A]      History of Present Illness: Keely Haider is a 75 y.o. female with  has a past medical history of Hypertension, Osteoarthritis of left hip, Osteoporosis, Scoliosis, and Seizures (CMS/HCC).  Who went to visit her son's birthdays on Sunday and delivered [employer] cards for is her grandchildren tonight.  Patient could not get him to answer the front door so she went to the basement.  Where she fell and to prevent hitting her head caught herself with her arms and broke both humeri.     Ortho addition: Patient on right has had open RCR (based on description) with \"graft placement\" approx 10-15 years ago.  She states that she has chronic issue including pain and weakness in this shoulder       Allergies   Allergen Reactions   • Sulfa Antibiotics Unknown - Low Severity        Home Medications:  No medications prior to admission.         Past Medical History:   Diagnosis Date   • Hypertension    • Osteoarthritis of left hip    • Osteoporosis    • Scoliosis    • Seizures (CMS/HCC)         Past Surgical History:   Procedure Laterality Date   • ABDOMINAL HYSTERECTOMY          Social History     Occupational History   • Not on file   Tobacco Use   • Smoking status: Never Smoker   Substance and Sexual Activity   • Alcohol use: Never     Frequency: Never   • Drug use: Never   • Sexual activity: Not on file      Social History     Social History Narrative    Just moved back from [place],  has early dementia doesn't drive, requires a lot of assistance, has son that lives in [place]    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    Physical Exam: 75 y.o. female  General Appearance:    Alert, cooperative, in no acute distress                   Vitals:    02/12/21 2228 02/13/21 0104 02/13/21 0500 02/13/21 0700   BP: 155/90 143/57 161/71 146/58   BP Location: Right arm Right arm Right arm Right arm   Patient Position: Lying Lying Lying Lying   Pulse: 64 80 72 76   Resp: 16 16 16 18   Temp: 98.2 °F (36.8 °C) 98.4 °F (36.9 °C) 98 °F (36.7 °C) 98.4 °F (36.9 °C)   TempSrc: Oral Oral Oral Oral   SpO2: 97% 91% 96% 95%   Weight:       Height:            Head:    Normocephalic, without obvious abnormality,mutiple small abrasion, bandage placed over R eye      Right Upper Extremity:  No obvious deformity, painless ROM  elbow, wrist,  normal distal strength and sensation to light touch, skin intact without cyanosis-- healed surgical incision over lateral side of shoulder, clubbing, edema; +2 radial pulse  Left Upper Extremity:  No obvious deformity, elbow, wrist, normal distal strength and sensation to light touch, skin intact without cyanosis, clubbing, edema; +2 radial pulse           Diagnostic Tests:    I have reviewed the labs, radiology results and diagnostic studies:    Results from last 7 days   Lab Units 02/12/21 2036   WBC 10*3/mm3 15.84*   HEMOGLOBIN g/dL 12.1   PLATELETS 10*3/mm3 248     Results from last 7 days   Lab Units 02/12/21 2036   SODIUM mmol/L 133*   POTASSIUM mmol/L 4.2   CO2 mmol/L 23.0   CREATININE mg/dL 0.50*   GLUCOSE mg/dL 118*       XR: bilateral proximal humerus fractures with displacement    Assessment:  Patient Active Problem List   Diagnosis   • Humerus fracture-bilateral   •  Idiopathic scoliosis of thoracolumbar spine   • Left hip pain   • Essential hypertension   • Seizure (CMS/HCC)   • Neck pain           Plan:    1) please place bilateral simple slings for comfort.  2) please obtain bilateral CT scans with 3D reconstructions for possible surgical planning-- given chronic right shoulder issues may be reasonable to allow to malunite and proceed with reverse arthroplasty if necessary in future  3) may eat and drink today  4) NPO p MN  5)hold anticoagulation later today in anticipation of OR tomorrow            Timoteo Loza MD  02/13/21  08:48 EST

## 2021-02-14 ENCOUNTER — APPOINTMENT (OUTPATIENT)
Dept: GENERAL RADIOLOGY | Facility: HOSPITAL | Age: 76
End: 2021-02-14

## 2021-02-14 ENCOUNTER — ANESTHESIA (OUTPATIENT)
Dept: PERIOP | Facility: HOSPITAL | Age: 76
End: 2021-02-14

## 2021-02-14 LAB
QT INTERVAL: 384 MS
QTC INTERVAL: 448 MS

## 2021-02-14 PROCEDURE — 25010000002 FENTANYL CITRATE (PF) 250 MCG/5ML SOLUTION: Performed by: NURSE ANESTHETIST, CERTIFIED REGISTERED

## 2021-02-14 PROCEDURE — G0378 HOSPITAL OBSERVATION PER HR: HCPCS

## 2021-02-14 PROCEDURE — C1713 ANCHOR/SCREW BN/BN,TIS/BN: HCPCS | Performed by: ORTHOPAEDIC SURGERY

## 2021-02-14 PROCEDURE — 25010000003 CEFAZOLIN IN DEXTROSE 2-4 GM/100ML-% SOLUTION: Performed by: ORTHOPAEDIC SURGERY

## 2021-02-14 PROCEDURE — 25010000002 FENTANYL CITRATE (PF) 100 MCG/2ML SOLUTION: Performed by: ANESTHESIOLOGY

## 2021-02-14 PROCEDURE — 25010000002 ROPIVACAINE PER 1 MG: Performed by: ORTHOPAEDIC SURGERY

## 2021-02-14 PROCEDURE — 25010000002 CEFTRIAXONE PER 250 MG: Performed by: FAMILY MEDICINE

## 2021-02-14 PROCEDURE — 25010000002 PROPOFOL 10 MG/ML EMULSION: Performed by: NURSE ANESTHETIST, CERTIFIED REGISTERED

## 2021-02-14 PROCEDURE — 76000 FLUOROSCOPY <1 HR PHYS/QHP: CPT

## 2021-02-14 PROCEDURE — 23615 OPTX PROX HUMRL FX W/INT FIX: CPT | Performed by: PHYSICIAN ASSISTANT

## 2021-02-14 PROCEDURE — 25010000002 HYDROMORPHONE PER 4 MG: Performed by: INTERNAL MEDICINE

## 2021-02-14 PROCEDURE — 25010000002 PHENYLEPHRINE 10 MG/ML SOLUTION 1 ML VIAL: Performed by: NURSE ANESTHETIST, CERTIFIED REGISTERED

## 2021-02-14 PROCEDURE — 97166 OT EVAL MOD COMPLEX 45 MIN: CPT

## 2021-02-14 PROCEDURE — 97535 SELF CARE MNGMENT TRAINING: CPT

## 2021-02-14 PROCEDURE — 99232 SBSQ HOSP IP/OBS MODERATE 35: CPT | Performed by: FAMILY MEDICINE

## 2021-02-14 PROCEDURE — 25010000002 ROPIVACAINE PER 1 MG: Performed by: ANESTHESIOLOGY

## 2021-02-14 PROCEDURE — C1889 IMPLANT/INSERT DEVICE, NOC: HCPCS | Performed by: ORTHOPAEDIC SURGERY

## 2021-02-14 PROCEDURE — 25010000002 DEXAMETHASONE PER 1 MG: Performed by: NURSE ANESTHETIST, CERTIFIED REGISTERED

## 2021-02-14 PROCEDURE — 25010000002 ONDANSETRON PER 1 MG: Performed by: NURSE ANESTHETIST, CERTIFIED REGISTERED

## 2021-02-14 PROCEDURE — 25010000002 VANCOMYCIN 1 G RECONSTITUTED SOLUTION: Performed by: ORTHOPAEDIC SURGERY

## 2021-02-14 PROCEDURE — 0PSD04Z REPOSITION LEFT HUMERAL HEAD WITH INTERNAL FIXATION DEVICE, OPEN APPROACH: ICD-10-PCS | Performed by: ORTHOPAEDIC SURGERY

## 2021-02-14 PROCEDURE — 97110 THERAPEUTIC EXERCISES: CPT

## 2021-02-14 PROCEDURE — 25010000002 CEFAZOLIN PER 500 MG: Performed by: ORTHOPAEDIC SURGERY

## 2021-02-14 DEVICE — CANNULATED SCREW, Ø3.5MM X L42MM
Type: IMPLANTABLE DEVICE | Site: ARM | Status: FUNCTIONAL
Brand: CANNULATED SCREW, 3.5MM

## 2021-02-14 DEVICE — PROXIMAL HUMERUS PLATE, 3 HOLE LEFT
Type: IMPLANTABLE DEVICE | Status: FUNCTIONAL
Brand: PROXIMAL HUMERUS PLATE

## 2021-02-14 DEVICE — CANNULATED SCREW, Ø3.5MM X L39MM
Type: IMPLANTABLE DEVICE | Site: ARM | Status: FUNCTIONAL
Brand: CANNULATED SCREW, 3.5MM

## 2021-02-14 DEVICE — MULTIUSE SCREW, Ø3.5MM X L30MM
Type: IMPLANTABLE DEVICE | Site: ARM | Status: FUNCTIONAL
Brand: MULTIUSE SCREW, 3.5MM

## 2021-02-14 DEVICE — CORTICAL SCREW, Ø3.5MM X L22MM
Type: IMPLANTABLE DEVICE | Site: ARM | Status: FUNCTIONAL
Brand: CORTICAL SCREW, 3.5MM

## 2021-02-14 DEVICE — LOCKING CAP, 3.5T
Type: IMPLANTABLE DEVICE | Site: ARM | Status: FUNCTIONAL
Brand: LOCKING CAP

## 2021-02-14 DEVICE — INJECTABLE HA BONE CEMENT
Type: IMPLANTABLE DEVICE | Site: ARM | Status: FUNCTIONAL
Brand: HYDROSET XT

## 2021-02-14 DEVICE — CANNULATED SCREW, Ø3.5MM X L36MM
Type: IMPLANTABLE DEVICE | Site: ARM | Status: FUNCTIONAL
Brand: CANNULATED SCREW, 3.5MM

## 2021-02-14 DEVICE — CORTICAL SCREW, Ø3.5MM X L24MM
Type: IMPLANTABLE DEVICE | Site: ARM | Status: FUNCTIONAL
Brand: CORTICAL SCREW, 3.5MM

## 2021-02-14 DEVICE — SUT FW #2 W/TPR NDL 1/2 CIR 38IN 97CM 26.5MM BLU: Type: IMPLANTABLE DEVICE | Site: ARM | Status: FUNCTIONAL

## 2021-02-14 RX ORDER — FENTANYL CITRATE 50 UG/ML
INJECTION, SOLUTION INTRAMUSCULAR; INTRAVENOUS AS NEEDED
Status: DISCONTINUED | OUTPATIENT
Start: 2021-02-14 | End: 2021-02-14 | Stop reason: SURG

## 2021-02-14 RX ORDER — ONDANSETRON 4 MG/1
4 TABLET, FILM COATED ORAL EVERY 6 HOURS PRN
Status: DISCONTINUED | OUTPATIENT
Start: 2021-02-14 | End: 2021-02-18 | Stop reason: HOSPADM

## 2021-02-14 RX ORDER — LIDOCAINE HYDROCHLORIDE 10 MG/ML
0.5 INJECTION, SOLUTION EPIDURAL; INFILTRATION; INTRACAUDAL; PERINEURAL ONCE AS NEEDED
Status: CANCELLED | OUTPATIENT
Start: 2021-02-14

## 2021-02-14 RX ORDER — FAMOTIDINE 10 MG/ML
20 INJECTION, SOLUTION INTRAVENOUS ONCE
Status: CANCELLED | OUTPATIENT
Start: 2021-02-14 | End: 2021-02-14

## 2021-02-14 RX ORDER — AMOXICILLIN 250 MG
2 CAPSULE ORAL 2 TIMES DAILY PRN
Status: DISCONTINUED | OUTPATIENT
Start: 2021-02-14 | End: 2021-02-15 | Stop reason: SDUPTHER

## 2021-02-14 RX ORDER — MAGNESIUM HYDROXIDE 1200 MG/15ML
LIQUID ORAL AS NEEDED
Status: DISCONTINUED | OUTPATIENT
Start: 2021-02-14 | End: 2021-02-14 | Stop reason: HOSPADM

## 2021-02-14 RX ORDER — ATRACURIUM BESYLATE 10 MG/ML
INJECTION, SOLUTION INTRAVENOUS AS NEEDED
Status: DISCONTINUED | OUTPATIENT
Start: 2021-02-14 | End: 2021-02-14 | Stop reason: SURG

## 2021-02-14 RX ORDER — PROPOFOL 10 MG/ML
VIAL (ML) INTRAVENOUS AS NEEDED
Status: DISCONTINUED | OUTPATIENT
Start: 2021-02-14 | End: 2021-02-14 | Stop reason: SURG

## 2021-02-14 RX ORDER — METOPROLOL TARTRATE 5 MG/5ML
5 INJECTION INTRAVENOUS ONCE
Status: COMPLETED | OUTPATIENT
Start: 2021-02-14 | End: 2021-02-14

## 2021-02-14 RX ORDER — ONDANSETRON 2 MG/ML
4 INJECTION INTRAMUSCULAR; INTRAVENOUS EVERY 6 HOURS PRN
Status: DISCONTINUED | OUTPATIENT
Start: 2021-02-14 | End: 2021-02-18 | Stop reason: HOSPADM

## 2021-02-14 RX ORDER — GLYCOPYRROLATE 0.2 MG/ML
INJECTION INTRAMUSCULAR; INTRAVENOUS AS NEEDED
Status: DISCONTINUED | OUTPATIENT
Start: 2021-02-14 | End: 2021-02-14 | Stop reason: SURG

## 2021-02-14 RX ORDER — CITALOPRAM 20 MG/1
20 TABLET ORAL EVERY MORNING
COMMUNITY
End: 2021-02-18 | Stop reason: HOSPADM

## 2021-02-14 RX ORDER — CEFAZOLIN SODIUM 2 G/100ML
2 INJECTION, SOLUTION INTRAVENOUS ONCE
Status: COMPLETED | OUTPATIENT
Start: 2021-02-14 | End: 2021-02-14

## 2021-02-14 RX ORDER — ASPIRIN 81 MG/1
81 TABLET, CHEWABLE ORAL DAILY
Status: DISCONTINUED | OUTPATIENT
Start: 2021-02-15 | End: 2021-02-18 | Stop reason: HOSPADM

## 2021-02-14 RX ORDER — BUPIVACAINE HYDROCHLORIDE 2.5 MG/ML
INJECTION, SOLUTION EPIDURAL; INFILTRATION; INTRACAUDAL
Status: COMPLETED | OUTPATIENT
Start: 2021-02-14 | End: 2021-02-14

## 2021-02-14 RX ORDER — ONDANSETRON 2 MG/ML
INJECTION INTRAMUSCULAR; INTRAVENOUS AS NEEDED
Status: DISCONTINUED | OUTPATIENT
Start: 2021-02-14 | End: 2021-02-14 | Stop reason: SURG

## 2021-02-14 RX ORDER — HYDROMORPHONE HYDROCHLORIDE 1 MG/ML
0.5 INJECTION, SOLUTION INTRAMUSCULAR; INTRAVENOUS; SUBCUTANEOUS
Status: DISCONTINUED | OUTPATIENT
Start: 2021-02-14 | End: 2021-02-18 | Stop reason: HOSPADM

## 2021-02-14 RX ORDER — ACETAMINOPHEN 325 MG/1
650 TABLET ORAL EVERY 4 HOURS PRN
Status: DISCONTINUED | OUTPATIENT
Start: 2021-02-14 | End: 2021-02-18 | Stop reason: HOSPADM

## 2021-02-14 RX ORDER — CEFAZOLIN SODIUM 2 G/100ML
2 INJECTION, SOLUTION INTRAVENOUS EVERY 8 HOURS
Status: COMPLETED | OUTPATIENT
Start: 2021-02-14 | End: 2021-02-15

## 2021-02-14 RX ORDER — FENTANYL CITRATE 50 UG/ML
INJECTION, SOLUTION INTRAMUSCULAR; INTRAVENOUS
Status: COMPLETED | OUTPATIENT
Start: 2021-02-14 | End: 2021-02-14

## 2021-02-14 RX ORDER — SODIUM CHLORIDE, SODIUM LACTATE, POTASSIUM CHLORIDE, CALCIUM CHLORIDE 600; 310; 30; 20 MG/100ML; MG/100ML; MG/100ML; MG/100ML
9 INJECTION, SOLUTION INTRAVENOUS CONTINUOUS
Status: DISCONTINUED | OUTPATIENT
Start: 2021-02-14 | End: 2021-02-15

## 2021-02-14 RX ORDER — NALOXONE HCL 0.4 MG/ML
0.1 VIAL (ML) INJECTION
Status: DISCONTINUED | OUTPATIENT
Start: 2021-02-14 | End: 2021-02-18 | Stop reason: HOSPADM

## 2021-02-14 RX ORDER — SIMVASTATIN 40 MG
40 TABLET ORAL NIGHTLY
COMMUNITY

## 2021-02-14 RX ORDER — METOPROLOL TARTRATE 5 MG/5ML
5 INJECTION INTRAVENOUS EVERY 6 HOURS
Status: DISCONTINUED | OUTPATIENT
Start: 2021-02-14 | End: 2021-02-14

## 2021-02-14 RX ORDER — SODIUM CHLORIDE 0.9 % (FLUSH) 0.9 %
10 SYRINGE (ML) INJECTION EVERY 12 HOURS SCHEDULED
Status: DISCONTINUED | OUTPATIENT
Start: 2021-02-14 | End: 2021-02-14 | Stop reason: HOSPADM

## 2021-02-14 RX ORDER — EPHEDRINE SULFATE 50 MG/ML
INJECTION, SOLUTION INTRAVENOUS AS NEEDED
Status: DISCONTINUED | OUTPATIENT
Start: 2021-02-14 | End: 2021-02-14 | Stop reason: SURG

## 2021-02-14 RX ORDER — SODIUM CHLORIDE 0.9 % (FLUSH) 0.9 %
10 SYRINGE (ML) INJECTION AS NEEDED
Status: DISCONTINUED | OUTPATIENT
Start: 2021-02-14 | End: 2021-02-14 | Stop reason: HOSPADM

## 2021-02-14 RX ORDER — ACETAMINOPHEN 650 MG/1
650 SUPPOSITORY RECTAL EVERY 4 HOURS PRN
Status: DISCONTINUED | OUTPATIENT
Start: 2021-02-14 | End: 2021-02-18 | Stop reason: HOSPADM

## 2021-02-14 RX ORDER — HYDROCODONE BITARTRATE AND ACETAMINOPHEN 5; 325 MG/1; MG/1
1 TABLET ORAL EVERY 4 HOURS PRN
Status: DISCONTINUED | OUTPATIENT
Start: 2021-02-14 | End: 2021-02-18 | Stop reason: HOSPADM

## 2021-02-14 RX ORDER — CITALOPRAM 20 MG/1
10 TABLET ORAL DAILY
Status: DISCONTINUED | OUTPATIENT
Start: 2021-02-15 | End: 2021-02-18 | Stop reason: HOSPADM

## 2021-02-14 RX ORDER — HYDROCODONE BITARTRATE AND ACETAMINOPHEN 5; 325 MG/1; MG/1
2 TABLET ORAL EVERY 4 HOURS PRN
Status: DISCONTINUED | OUTPATIENT
Start: 2021-02-14 | End: 2021-02-18 | Stop reason: HOSPADM

## 2021-02-14 RX ORDER — BUPIVACAINE HCL/0.9 % NACL/PF 0.125 %
PLASTIC BAG, INJECTION (ML) EPIDURAL AS NEEDED
Status: DISCONTINUED | OUTPATIENT
Start: 2021-02-14 | End: 2021-02-14 | Stop reason: SURG

## 2021-02-14 RX ORDER — ASPIRIN 81 MG/1
81 TABLET, CHEWABLE ORAL DAILY
COMMUNITY

## 2021-02-14 RX ORDER — DEXAMETHASONE SODIUM PHOSPHATE 4 MG/ML
INJECTION, SOLUTION INTRA-ARTICULAR; INTRALESIONAL; INTRAMUSCULAR; INTRAVENOUS; SOFT TISSUE AS NEEDED
Status: DISCONTINUED | OUTPATIENT
Start: 2021-02-14 | End: 2021-02-14 | Stop reason: SURG

## 2021-02-14 RX ORDER — FAMOTIDINE 20 MG/1
20 TABLET, FILM COATED ORAL ONCE
Status: COMPLETED | OUTPATIENT
Start: 2021-02-14 | End: 2021-02-14

## 2021-02-14 RX ORDER — LOSARTAN POTASSIUM AND HYDROCHLOROTHIAZIDE 12.5; 5 MG/1; MG/1
1 TABLET ORAL
COMMUNITY

## 2021-02-14 RX ORDER — ATORVASTATIN CALCIUM 20 MG/1
20 TABLET, FILM COATED ORAL DAILY
Status: DISCONTINUED | OUTPATIENT
Start: 2021-02-14 | End: 2021-02-18 | Stop reason: HOSPADM

## 2021-02-14 RX ORDER — SODIUM CHLORIDE 450 MG/100ML
50 INJECTION, SOLUTION INTRAVENOUS CONTINUOUS
Status: DISCONTINUED | OUTPATIENT
Start: 2021-02-14 | End: 2021-02-18 | Stop reason: HOSPADM

## 2021-02-14 RX ORDER — VANCOMYCIN HYDROCHLORIDE 1 G/20ML
INJECTION, POWDER, LYOPHILIZED, FOR SOLUTION INTRAVENOUS AS NEEDED
Status: DISCONTINUED | OUTPATIENT
Start: 2021-02-14 | End: 2021-02-14 | Stop reason: HOSPADM

## 2021-02-14 RX ORDER — LIDOCAINE HYDROCHLORIDE 10 MG/ML
INJECTION, SOLUTION EPIDURAL; INFILTRATION; INTRACAUDAL; PERINEURAL AS NEEDED
Status: DISCONTINUED | OUTPATIENT
Start: 2021-02-14 | End: 2021-02-14 | Stop reason: SURG

## 2021-02-14 RX ADMIN — FENTANYL CITRATE 50 MCG: 50 INJECTION, SOLUTION INTRAMUSCULAR; INTRAVENOUS at 08:20

## 2021-02-14 RX ADMIN — SODIUM CHLORIDE 1 G: 900 INJECTION INTRAVENOUS at 13:53

## 2021-02-14 RX ADMIN — Medication 100 MCG: at 08:40

## 2021-02-14 RX ADMIN — ATORVASTATIN CALCIUM 20 MG: 20 TABLET, FILM COATED ORAL at 18:43

## 2021-02-14 RX ADMIN — ONDANSETRON 4 MG: 2 INJECTION INTRAMUSCULAR; INTRAVENOUS at 10:13

## 2021-02-14 RX ADMIN — Medication 1 TABLET: at 18:43

## 2021-02-14 RX ADMIN — ATRACURIUM BESYLATE 50 MG: 10 INJECTION, SOLUTION INTRAVENOUS at 08:36

## 2021-02-14 RX ADMIN — PROPOFOL 150 MG: 10 INJECTION, EMULSION INTRAVENOUS at 08:36

## 2021-02-14 RX ADMIN — AMLODIPINE BESYLATE 10 MG: 10 TABLET ORAL at 13:52

## 2021-02-14 RX ADMIN — LIDOCAINE HYDROCHLORIDE 60 MG: 10 INJECTION, SOLUTION EPIDURAL; INFILTRATION; INTRACAUDAL; PERINEURAL at 08:36

## 2021-02-14 RX ADMIN — PHENYLEPHRINE HYDROCHLORIDE 0.2 MCG/KG/MIN: 10 INJECTION INTRAVENOUS at 09:34

## 2021-02-14 RX ADMIN — DEXAMETHASONE SODIUM PHOSPHATE 4 MG: 4 INJECTION, SOLUTION INTRA-ARTICULAR; INTRALESIONAL; INTRAMUSCULAR; INTRAVENOUS; SOFT TISSUE at 08:43

## 2021-02-14 RX ADMIN — LEVETIRACETAM 1500 MG: 750 TABLET, FILM COATED ORAL at 13:52

## 2021-02-14 RX ADMIN — GLYCOPYRROLATE 0.2 MG: 0.4 INJECTION INTRAMUSCULAR; INTRAVENOUS at 09:15

## 2021-02-14 RX ADMIN — Medication 100 MCG: at 09:30

## 2021-02-14 RX ADMIN — SODIUM CHLORIDE, POTASSIUM CHLORIDE, SODIUM LACTATE AND CALCIUM CHLORIDE 9 ML/HR: 600; 310; 30; 20 INJECTION, SOLUTION INTRAVENOUS at 08:10

## 2021-02-14 RX ADMIN — LISINOPRIL 20 MG: 20 TABLET ORAL at 13:52

## 2021-02-14 RX ADMIN — ROPIVACAINE HYDROCHLORIDE 4 ML/HR: 5 INJECTION, SOLUTION EPIDURAL; INFILTRATION; PERINEURAL at 11:15

## 2021-02-14 RX ADMIN — FENTANYL CITRATE 100 MCG: 50 INJECTION, SOLUTION INTRAMUSCULAR; INTRAVENOUS at 10:32

## 2021-02-14 RX ADMIN — Medication 1000 MG: at 10:31

## 2021-02-14 RX ADMIN — Medication 200 MCG: at 09:01

## 2021-02-14 RX ADMIN — METOPROLOL TARTRATE 5 MG: 5 INJECTION INTRAVENOUS at 04:05

## 2021-02-14 RX ADMIN — FAMOTIDINE 20 MG: 20 TABLET, FILM COATED ORAL at 08:10

## 2021-02-14 RX ADMIN — SODIUM CHLORIDE, POTASSIUM CHLORIDE, SODIUM LACTATE AND CALCIUM CHLORIDE: 600; 310; 30; 20 INJECTION, SOLUTION INTRAVENOUS at 10:33

## 2021-02-14 RX ADMIN — LEVETIRACETAM 1000 MG: 500 TABLET, FILM COATED ORAL at 20:01

## 2021-02-14 RX ADMIN — EPHEDRINE SULFATE 10 MG: 50 INJECTION, SOLUTION INTRAVENOUS at 09:14

## 2021-02-14 RX ADMIN — EPHEDRINE SULFATE 15 MG: 50 INJECTION, SOLUTION INTRAVENOUS at 09:11

## 2021-02-14 RX ADMIN — CEFAZOLIN SODIUM 2 G: 2 INJECTION, SOLUTION INTRAVENOUS at 08:51

## 2021-02-14 RX ADMIN — FENTANYL CITRATE 100 MCG: 50 INJECTION, SOLUTION INTRAMUSCULAR; INTRAVENOUS at 08:36

## 2021-02-14 RX ADMIN — BUPIVACAINE HYDROCHLORIDE 30 ML: 2.5 INJECTION, SOLUTION EPIDURAL; INFILTRATION; INTRACAUDAL; PERINEURAL at 08:20

## 2021-02-14 RX ADMIN — Medication 200 MCG: at 08:50

## 2021-02-14 RX ADMIN — HYDROMORPHONE HYDROCHLORIDE 0.25 MG: 1 INJECTION, SOLUTION INTRAMUSCULAR; INTRAVENOUS; SUBCUTANEOUS at 04:50

## 2021-02-14 RX ADMIN — SODIUM CHLORIDE 50 ML/HR: 4.5 INJECTION, SOLUTION INTRAVENOUS at 12:10

## 2021-02-14 RX ADMIN — Medication 1000 MG: at 08:40

## 2021-02-14 RX ADMIN — CEFAZOLIN 2 G: 10 INJECTION, POWDER, FOR SOLUTION INTRAVENOUS at 16:13

## 2021-02-14 NOTE — ANESTHESIA PROCEDURE NOTES
Airway  Urgency: elective    Airway not difficult    General Information and Staff    Patient location during procedure: OR    Indications and Patient Condition  Indications for airway management: airway protection    Preoxygenated: yes  MILS not maintained throughout  Mask difficulty assessment: 1 - vent by mask    Final Airway Details  Final airway type: endotracheal airway      Successful airway: ETT  Cuffed: yes   Successful intubation technique: direct laryngoscopy  Endotracheal tube insertion site: oral  Blade: Eitan  Blade size: 3  ETT size (mm): 6.5  Cormack-Lehane Classification: grade I - full view of glottis  Placement verified by: chest auscultation and capnometry   Measured from: lips  ETT/EBT  to lips (cm): 20  Number of attempts at approach: 1  Assessment: lips, teeth, and gum same as pre-op and atraumatic intubation

## 2021-02-14 NOTE — PLAN OF CARE
Goal Outcome Evaluation:  Plan of Care Reviewed With: patient  Progress: improving  Outcome Summary: OT eval complete. Pt completes bed mobility with modAx1 and transfers with CGA and gait belt. Pt ambulated approximately 100 feet with CGA and O2 saturations >90. Pt did not pass mobility screen and will need PT eval. OT issued don jelena ultra sling x2 without abductor pillow. Pt educated on importance of learning to learn/lead her care due to bilateral humerus fx. Pt educated on sling management as well as wear and care, shoulder precautions, axilla care, harish dressing and care of nerve catheter during ADLS. Pt dependent for all sling managment and LBD at this time. Pt completed L UE PROM elbow/wrist/hand and RUE AROM/AAROM elbow/wrist/hand all x10 reps. Pt is pleasent and cooperative. Pt would benefit from IPR prior to returning home.

## 2021-02-14 NOTE — PLAN OF CARE
Tolerated ORIF of Left well.  PNC in place at 4ml/hr.  No complaints of pain.  IV fluids running and abx administered.  BUE slings in place.  Good UOP noted.  Patient is getting frustrated realizing her limitations and her dependency on others.  BP was elevated but talking with family knowing  and dog were safe) and medication brought it to a tolerable level.  Patient requires to be fed and fluids offered frequently.  Will continue to monitor.

## 2021-02-14 NOTE — ANESTHESIA PREPROCEDURE EVALUATION
Anesthesia Evaluation     Patient summary reviewed and Nursing notes reviewed   no history of anesthetic complications:  NPO Solid Status: > 8 hours  NPO Liquid Status: > 8 hours           Airway   Mallampati: II  TM distance: >3 FB  Neck ROM: full  No difficulty expected  Dental - normal exam     Pulmonary - normal exam   (+) sleep apnea on CPAP,   (-) not a smoker  Cardiovascular - normal exam  Exercise tolerance: good (4-7 METS)    ECG reviewed    (+) hypertension,   (-) CAD, dysrhythmias, angina, CHF    ROS comment: 2/13/21- EKG NSR    Neuro/Psych  (+) seizures (last 4 years ago, controlled with keppra, etio unknown per pt),     (-) TIA, CVA  GI/Hepatic/Renal/Endo    (-) GERD, liver disease, no renal disease, diabetes, no thyroid disorder    Musculoskeletal     (+) back pain, neck pain,   Abdominal    Substance History - negative use     OB/GYN          Other   arthritis,      ROS/Med Hx Other: hgb 12 k 4.2                Anesthesia Plan    ASA 2     general with block   (Risks and benefits of general anesthesia discussed with patient (including MI, CVA, death, recall, aspiration), questions answered, agreeable to proceed.  )  intravenous induction     Anesthetic plan, all risks, benefits, and alternatives have been provided, discussed and informed consent has been obtained with: patient.    Plan discussed with CRNA.

## 2021-02-14 NOTE — THERAPY EVALUATION
Patient Name: Keely Haider  : 1945    MRN: [id number]                              Today's Date: 2021       Admit Date: 2021    Visit Dx:     ICD-10-CM ICD-9-CM   1. Closed fracture of both humeri, initial encounter  S42.301A 819.0    S42.302A    2. Closed fracture of nasal bone, initial encounter  S02.2XXA 802.0   3. Fall due to slipping on ice or snow, initial encounter  W00.9XXA E885.9     Patient Active Problem List   Diagnosis   • Humerus fracture-bilateral   • Idiopathic scoliosis of thoracolumbar spine   • Left hip pain   • Essential hypertension   • Seizure (CMS/HCC)   • Neck pain     Past Medical History:   Diagnosis Date   • Hypertension    • Osteoarthritis of left hip    • Osteoporosis    • Scoliosis    • Seizures (CMS/HCC)      Past Surgical History:   Procedure Laterality Date   • ABDOMINAL HYSTERECTOMY       General Information     Row Name 21 1519          OT Time and Intention    Document Type  evaluation  -HK     Mode of Treatment  occupational therapy  -HK     Row Name 21 1519          General Information    Patient Profile Reviewed  yes  -HK     Prior Level of Function  independent:;all household mobility;community mobility;gait;transfer;bed mobility;ADL's pt is caregiver for  with dementia  -HK     Existing Precautions/Restrictions  fall;non-weight bearing;left;right;shoulder;other (see comments) NWB B UE;  BUE in sling; L interscalene nerve catheter.  -HK     Barriers to Rehab  medically complex  -HK     Row Name 21 1519          Living Environment    Lives With  spouse;other (see comments) Caregiver for  with dementia  -HK     Row Name 21 1519          Home Main Entrance    Number of Stairs, Main Entrance  none  -HK     Stair Railings, Main Entrance  none  -HK     Row Name 21 1519          Stairs Within Home, Primary    Stairs, Within Home, Primary  Per pt her daughter in law has found her and her  an asisted living  apartment and  is moving in today. Pt reports post rehab she will be in assisted living.  -     Number of Stairs, Within Home, Primary  none  -HK     Stair Railings, Within Home, Primary  none  -HK     Stairs Comment, Within Home, Primary  Pt reports walk in shower in new apartment.  -     Row Name 02/14/21 1519          Cognition    Orientation Status (Cognition)  oriented x 4  -     Row Name 02/14/21 1519          Safety Issues, Functional Mobility    Safety Issues Affecting Function (Mobility)  safety precautions follow-through/compliance;safety precaution awareness;insight into deficits/self-awareness;judgment;sequencing abilities;ability to follow commands  -     Impairments Affecting Function (Mobility)  balance;endurance/activity tolerance;pain;strength;range of motion (ROM);sensation/sensory awareness;postural/trunk control  -       User Key  (r) = Recorded By, (t) = Taken By, (c) = Cosigned By    Initials Name Provider Type     Fina Cooper, OT Occupational Therapist          Mobility/ADL's     Row Name 02/14/21 1528          Bed Mobility    Bed Mobility  scooting/bridging;supine-sit  -HK     Scooting/Bridging [place] (Bed Mobility)  moderate assist (50% patient effort);1 person assist;verbal cues  -     Supine-Sit [place] (Bed Mobility)  moderate assist (50% patient effort);1 person assist;verbal cues  -HK     Bed Mobility, Safety Issues  decreased use of arms for pushing/pulling;impaired trunk control for bed mobility  -HK     Assistive Device (Bed Mobility)  head of bed elevated  -HK     Comment (Bed Mobility)  Pt educated on safe completion of bed mobility while maintaining NWB B UE. Pt requires modA to sit trunk upright.  -     Row Name 02/14/21 1528          Transfers    Transfers  sit-stand transfer  -     Comment (Transfers)  Verbal cues for safety and attention to BUE.  -     Sit-Stand [place] (Transfers)  contact guard;verbal cues  -     Row Name  02/14/21 1528          Sit-Stand Transfer    Assistive Device (Sit-Stand Transfers)  other (see comments) gait belt  -     Row Name 02/14/21 1528          Functional Mobility    Functional Mobility- Ind. Level  verbal cues required;contact guard assist  -     Functional Mobility- Device  rolling walker  -HK     Functional Mobility-Distance (Feet)  100  -HK     Functional Mobility- Safety Issues  balance decreased during turns;sequencing ability decreased;step length decreased;weight-shifting ability decreased  -HK     Functional Mobility- Comment  Pt ambulated 100 feet with CGA and gait belt. O2 saturations >90 throughout all mobility.  -     Row Name 02/14/21 1528          Activities of Daily Living    BADL Assessment/Intervention  bathing;upper body dressing;lower body dressing;feeding  -     Row Name 02/14/21 1528          Mobility    Extremity Weight-bearing Status  left upper extremity;right upper extremity  -HK     Left Upper Extremity (Weight-bearing Status)  (S) non weight-bearing (NWB)  -HK     Right Upper Extremity (Weight-bearing Status)  (S) non weight-bearing (NWB)  -     Row Name 02/14/21 1528          Bathing Assessment/Intervention    Comment (Bathing)  Pt educated on completion of axilla care while maintaining shoulder precautions. Pt will be maxA for completion due to bilateral shoulder precautions.  -     Row Name 02/14/21 1528          Upper Body Dressing Assessment/Training    [place] Level (Upper Body Dressing)  doff;don;other (see comments);dependent (less than 25% patient effort) sling  -HK     Position (Upper Body Dressing)  edge of bed sitting  -HK     Comment (Upper Body Dressing)  OT issued don jelena ultra sling X2 without abductor pillows. Pt educated on importance of understanding care and being able to direct care due to bilateral humerus fx.  Pt educated on sling management as well as wear and care, shoulder precautions, axilla care, harish dressing, and care of nerve  catheter during ADLS. Pt dependent for all sling management.  -     Row Name 02/14/21 1528          Lower Body Dressing Assessment/Training    [place] Level (Lower Body Dressing)  don;socks;dependent (less than 25% patient effort)  -     Position (Lower Body Dressing)  unsupported sitting  -[facility] Name 02/14/21 1528          Self-Feeding Assessment/Training    [place] Level (Feeding)  dependent (less than 25% patient effort)  -     Comment (Feeding)  Currently pt is total assist for feeding. Pt educated on use of R elbow flexion/extension to assist with self feeding.  -       User Key  (r) = Recorded By, (t) = Taken By, (c) = Cosigned By    Initials Name Provider Type     Fina Cooper, OT Occupational Therapist        Obj/Interventions     [facility] Name 02/14/21 1543          Sensory Assessment (Somatosensory)    Sensory Assessment (Somatosensory)  left UE;right-sided sensation intact  -     Left UE Sensory Assessment  general sensation  -     Sensory Subjective Reports  insensate  -[facility] Name 02/14/21 1543          Vision Assessment/Intervention    Visual Impairment/Limitations  corrective lenses for reading  -[facility] Name 02/14/21 1543          Range of Motion Comprehensive    Comment, General Range of Motion  B UE In sling  -[facility] Name 02/14/21 1543          Strength Comprehensive (MMT)    Comment, General Manual Muscle Testing (MMT) Assessment  B UE In sling  -[facility] Name 02/14/21 1543          Elbow/Forearm (Therapeutic Exercise)    Elbow/Forearm (Therapeutic Exercise)  PROM (passive range of motion);AAROM (active assistive range of motion);AROM (active range of motion)  -     Elbow/Forearm AROM (Therapeutic Exercise)  right;supination;pronation;sitting;10 repetitions  -     Elbow/Forearm AAROM (Therapeutic Exercise)  right;flexion;extension;10 repetitions;sitting  -     Elbow/Forearm PROM (Therapeutic Exercise)   left;flexion;extension;supination;pronation;sitting;10 repetitions  -     Row Name 02/14/21 1543          Wrist (Therapeutic Exercise)    Wrist (Therapeutic Exercise)  PROM (passive range of motion);AROM (active range of motion)  -     Wrist AROM (Therapeutic Exercise)  right;flexion;extension;10 repetitions  -     Wrist PROM (Therapeutic Exercise)  left;flexion;extension;10 repetitions  -     Row Name 02/14/21 1543          Hand (Therapeutic Exercise)    Hand (Therapeutic Exercise)  AROM (active range of motion);PROM (passive range of motion)  -     Hand AROM/AAROM (Therapeutic Exercise)  right;AROM (active range of motion);finger flexion;finger extension;10 repetitions  -     Hand PROM (Therapeutic Exercise)  left;finger flexion;finger extension;10 repetitions  -     Row Name 02/14/21 1543          Balance    Balance Assessment  sitting static balance;sitting dynamic balance;standing static balance  -     Static Sitting Balance  WNL;unsupported;sitting, edge of bed  -     Dynamic Sitting Balance  WNL;unsupported;sitting, edge of bed  -     Static Standing Balance  WFL;standing;unsupported  -     Row Name 02/14/21 1543          Therapeutic Exercise    Therapeutic Exercise  elbow/forearm;wrist;hand  -       User Key  (r) = Recorded By, (t) = Taken By, (c) = Cosigned By    Initials Name Provider Type     Fina Cooper, OT Occupational Therapist        Goals/Plan     Row Name 02/14/21 1549          Bed Mobility Goal 1 (OT)    Activity/Assistive Device (Bed Mobility Goal 1, OT)  scooting;sit to supine/supine to sit  -     [place] Level/Cues Needed (Bed Mobility Goal 1, OT)  minimum assist (75% or more patient effort);verbal cues required  -HK     Time Frame (Bed Mobility Goal 1, OT)  by discharge  -HK     Progress/Outcomes (Bed Mobility Goal 1, OT)  goal ongoing  -     Row Name 02/14/21 1549          Transfer Goal 1 (OT)    Activity/Assistive Device (Transfer Goal 1, OT)   sit-to-stand/stand-to-sit;toilet  -HK     [place] Level/Cues Needed (Transfer Goal 1, OT)  standby assist;verbal cues required  -HK     Time Frame (Transfer Goal 1, OT)  by discharge  -HK     Progress/Outcome (Transfer Goal 1, OT)  goal ongoing  -HK     Row Name 02/14/21 1549          Dressing Goal 1 (OT)    Activity/Device (Dressing Goal 1, OT)  other (see comments) Pt will be able to lead dof/donning sling verbally.  -HK     [place]/Cues Needed (Dressing Goal 1, OT)  minimum assist (75% or more patient effort);verbal cues required  -HK     Time Frame (Dressing Goal 1, OT)  by discharge  -HK     Progress/Outcome (Dressing Goal 1, OT)  goal ongoing  -HK     Row Name 02/14/21 1547          ROM Goal 1 (OT)    ROM Goal 1 (OT)  Pt will be able to lead completion of B UE HEP per surgeons preference  -HK     Time Frame (ROM Goal 1, OT)  by discharge  -HK     Progress/Outcome (ROM Goal 1, OT)  goal ongoing  -HK       User Key  (r) = Recorded By, (t) = Taken By, (c) = Cosigned By    Initials Name Provider Type    HK Fina Cooper, OT Occupational Therapist        Clinical Impression     Row Name 02/14/21 1548          Pain Assessment    Additional Documentation  Pain Scale: FACES Pre/Post-Treatment (Group)  -HK     Row Name 02/14/21 1548          Pain Scale: FACES Pre/Post-Treatment    Pain: FACES Scale, Pretreatment  2-->hurts little bit  -HK     Posttreatment Pain Rating  2-->hurts little bit  -HK     Pain Location - Side  Bilateral  -HK     Pain Location - Orientation  generalized  -HK     Pain Location  shoulder  -HK     Row Name 02/14/21 1541          Plan of Care Review    Plan of Care Reviewed With  patient  -HK     Progress  improving  -HK     Outcome Summary  OT eval complete. Pt completes bed mobility with modAx1 and transfers with CGA and gait belt. Pt ambulated approximately 100 feet with CGA and O2 saturations >90. Pt did not pass mobility screen and will need PT eval. OT issued don jelena ultra sling x2  without abductor pillow. Pt educated on importance of learning to learn/lead her care due to bilateral humerus fx. Pt educated on sling management as well as wear and care, shoulder precautions, axilla care, harish dressing and care of nerve catheter during ADLS. Pt dependent for all sling managment and LBD at this time. Pt completed L UE PROM elbow/wrist/hand and RUE AROM/AAROM elbow/wrist/hand all x10 reps. Pt is pleasent and cooperative. Pt would benefit from IPR prior to returning home.  -     Row Name 02/14/21 1548          Therapy Assessment/Plan (OT)    Patient/Family Therapy Goal Statement (OT)  Pt would like to improve and return home.  -     Rehab Potential (OT)  good, to achieve stated therapy goals  -     Criteria for Skilled Therapeutic Interventions Met (OT)  yes;skilled treatment is necessary  -     Therapy Frequency (OT)  daily  -     Row Name 02/14/21 1548          Therapy Plan Review/Discharge Plan (OT)    Anticipated Discharge Disposition (OT)  inpatient rehabilitation facility  -     Row Name 02/14/21 1541          Vital Signs    Pre Systolic BP Rehab  -- RN cleared for tx; VSS  -HK     Pre SpO2 (%)  95  -HK     O2 Delivery Pre Treatment  room air  -HK     Intra SpO2 (%)  93  -HK     O2 Delivery Intra Treatment  room air  -HK     Post SpO2 (%)  94  -HK     O2 Delivery Post Treatment  room air  -HK     Pre Patient Position  Supine  -HK     Intra Patient Position  Standing  -HK     Post Patient Position  Sitting  -HK     Row Name 02/14/21 5061          Positioning and Restraints    Pre-Treatment Position  in bed  -HK     Post Treatment Position  chair  -HK     In Chair  notified nsg;reclined;call light within reach;encouraged to call for assist;exit alarm on;waffle cushion;legs elevated  -HK       User Key  (r) = Recorded By, (t) = Taken By, (c) = Cosigned By    Initials Name Provider Type     Fina Cooper, OT Occupational Therapist        Outcome Measures     Row Name 02/14/21 2242           How much help from another is currently needed...    Putting on and taking off regular lower body clothing?  1  -HK     Bathing (including washing, rinsing, and drying)  1  -HK     Toileting (which includes using toilet bed pan or urinal)  1  -HK     Putting on and taking off regular upper body clothing  1  -HK     Taking care of personal grooming (such as brushing teeth)  1  -HK     Eating meals  2  -HK     AM-PAC 6 Clicks Score (OT)  7  -HK     Row Name 02/14/21 1550          Functional Assessment    Outcome Measure Options  AM-PAC 6 Clicks Daily Activity (OT)  -       User Key  (r) = Recorded By, (t) = Taken By, (c) = Cosigned By    Initials Name Provider Type    HK Fina Cooper, OT Occupational Therapist        Occupational Therapy Education                 Title: PT OT SLP Therapies (Done)     Topic: Occupational Therapy (Done)     Point: ADL training (Done)     Description:   Instruct learner(s) on proper safety adaptation and remediation techniques during self care or transfers.   Instruct in proper use of assistive devices.              Learning Progress Summary           Patient Acceptance, TB,E,D,H, VU,DU,NR by  at 2/14/2021 1551                   Point: Home exercise program (Done)     Description:   Instruct learner(s) on appropriate technique for monitoring, assisting and/or progressing therapeutic exercises/activities.              Learning Progress Summary           Patient Acceptance, TB,E,D,H, VU,DU,NR by  at 2/14/2021 1551                   Point: Precautions (Done)     Description:   Instruct learner(s) on prescribed precautions during self-care and functional transfers.              Learning Progress Summary           Patient Acceptance, TB,E,D,H, VU,DU,NR by  at 2/14/2021 1551                   Point: Body mechanics (Done)     Description:   Instruct learner(s) on proper positioning and spine alignment during self-care, functional mobility activities and/or exercises.               Learning Progress Summary           Patient Acceptance, TB,E,D,H, VU,DU,NR by  at 2/14/2021 1551                               User Key     Initials Effective Dates Name Provider Type [facility] 03/07/18 -  Fina Cooper OT Occupational Therapist OT              OT Recommendation and Plan  Therapy Frequency (OT): daily  Plan of Care Review  Plan of Care Reviewed With: patient  Progress: improving  Outcome Summary: OT eval complete. Pt completes bed mobility with modAx1 and transfers with CGA and gait belt. Pt ambulated approximately 100 feet with CGA and O2 saturations >90. Pt did not pass mobility screen and will need PT eval. OT issued don jelena ultra sling x2 without abductor pillow. Pt educated on importance of learning to learn/lead her care due to bilateral humerus fx. Pt educated on sling management as well as wear and care, shoulder precautions, axilla care, harish dressing and care of nerve catheter during ADLS. Pt dependent for all sling managment and LBD at this time. Pt completed L UE PROM elbow/wrist/hand and RUE AROM/AAROM elbow/wrist/hand all x10 reps. Pt is pleasent and cooperative. Pt would benefit from IPR prior to returning home.     Time Calculation:   Time Calculation- OT     Row Name 02/14/21 1416             Time Calculation- OT    OT Start Time  1416  -      OT Received On  02/14/21  -      OT Goal Re-Cert Due Date  02/24/21  -         Timed Charges    94670 - OT Therapeutic Exercise Minutes  15  -      89754 - OT Self Care/Mgmt Minutes  25  -        User Key  (r) = Recorded By, (t) = Taken By, (c) = Cosigned By    Initials Name Provider Type     Fina Cooper OT Occupational Therapist        Therapy Charges for Today     Code Description Service Date Service Provider Modifiers Qty    35583390180 HC OT SELF CARE/MGMT/TRAIN EA 15 MIN 2/14/2021 Fina Cooper OT GO 2    89687407424 HC OT THER PROC EA 15 MIN 2/14/2021 Fina Cooper OT GO 1    55466450049 HC OT EVAL MOD  COMPLEXITY 3 2/14/2021 Fina Cooper, OT GO 1               Fina Cooper, OT  2/14/2021

## 2021-02-14 NOTE — ANESTHESIA POSTPROCEDURE EVALUATION
Patient: Keely Haider    Procedure Summary     Date: 02/14/21 Room / Location:  [facility]  /  [facility]    Anesthesia Start: 0833 Anesthesia Stop: 1126    Procedure: HUMERUS PROXIMAL OPEN REDUCTION INTERNAL FIXATION (Left Arm Upper) Diagnosis:       Proximal humeral fracture      (proximal humerus fracture)    Surgeon: Timoteo Loza MD Provider: Rosalinda Bruner DO    Anesthesia Type: general with block ASA Status: 2          Anesthesia Type: general with block    Vitals  Vitals Value Taken Time   /63 02/14/21 1126   Temp 97.4 °F (36.3 °C) 02/14/21 1126   Pulse 93 02/14/21 1126   Resp 16 02/14/21 1126   SpO2 100 % 02/14/21 1126           Post Anesthesia Care and Evaluation    Patient location during evaluation: PACU  Patient participation: complete - patient participated  Level of consciousness: awake and alert  Pain management: adequate  Airway patency: patent  Anesthetic complications: No anesthetic complications  PONV Status: none  Cardiovascular status: acceptable  Respiratory status: acceptable  Hydration status: acceptable

## 2021-02-14 NOTE — ANESTHESIA PROCEDURE NOTES
Peripheral Block      Patient reassessed immediately prior to procedure    Patient location during procedure: pre-op  Reason for block: at surgeon's request and post-op pain management  Performed by  Anesthesiologist: Rosalinda Bruner DO  Preanesthetic Checklist  Completed: patient identified, site marked, surgical consent, pre-op evaluation, timeout performed, IV checked, risks and benefits discussed and monitors and equipment checked  Prep:  Pt Position: right lateral decubitus  Sterile barriers:cap, gloves, mask, sterile barriers and partial drape  Prep: ChloraPrep  Patient monitoring: blood pressure monitoring, continuous pulse oximetry and EKG  Procedure  Sedation:yes  Performed under: local infiltration  Guidance:ultrasound guided  ULTRASOUND INTERPRETATION.  Using ultrasound guidance a 21 G gauge needle was placed in close proximity to the brachial plexus nerve, at which point, under ultrasound guidance anesthetic was injected in the area of the nerve and spread of the anesthesia was seen on ultrasound in close proximity thereto.  There were no abnormalities seen on ultrasound; a digital image was taken; and the patient tolerated the procedure with no complications. Images:still images obtained, printed/placed on chart    Laterality:left  Block Type:interscalene  Injection Technique:catheter  Needle Type:Tuohy and echogenic  Needle Gauge:18 G  Resistance on Injection: none  Catheter Size:20 G (20g)    Medications Used: bupivacaine PF (MARCAINE) 0.25 % injection, 30 mL  fentaNYL citrate (PF) (SUBLIMAZE) injection, 50 mcg  Med admintered at 2/14/2021 8:20 AM      Post Assessment  Injection Assessment: negative aspiration for heme, no paresthesia on injection and incremental injection  Patient Tolerance:comfortable throughout block  Complications:no  Additional Notes  Procedure:                 The pt was placed in semifowlers position with a slight tilt of the thorax contralateral to the insertion site.  The  Insertion Site was prepped and draped in sterile fashion.  The pt was anesthetized with  IV Sedation( see meds) and  Skin and cutaneous tissue was infiltrated and anesthetized with 1% Lidocaine 3 mls via a 25g needle.  Utilizing ultrasound guidance, a BBraun 2 inch 18 g Contiplex echogenic touhy needle was advanced in-plane.  Hydro dissection of tissue was achieved with Normal saline. Major vessels(carotid and Internal Jugular) where visualized as the brachial plexus was approached at the approximate level of C-7/ T-1.  Cervical 5 and Branches of Cervical 6 nerve roots where visualized and the needle tip was placed posterior at the level of C-6 roots.  LA spread was visualized and injection was made incrementally every 5 mls with aspiration. Injection pressure was normal or little, there was no intraneural injection, no vascular injection.      The BBraun 20 g wire stylet  catheter was then placed under US guidance on the posterior aspect of the Brachial Plexus.  Location of catheter was confirmed with NS injection visualized with US . The tuohy was then removed and the skin was sealed with Skin AFix at catheter insertion site.  Skin was prepped with mastisol and the labeled catheter  was secured with steristrips and a CHG tegaderm. Thank You.

## 2021-02-14 NOTE — PROGRESS NOTES
[facility] Medicine Services  PROGRESS NOTE    Patient Name: Keely Haider  : 1945  MRN: [id number]    Date of Admission: 2021  Primary Care Physician: Provider, No Known    Subjective   Subjective     CC:  F/U bilateral humerus fractures     HPI:  Pt seen and examined. RN notes reviewed. No acute events overnight. Pt just returned from OR. Pain currently controlled. Discussed her abnormal UA, she has been having some urinary frequency.    ROS:  Gen- No fevers, chills  CV- No chest pain, palpitations  Resp- No cough, dyspnea  GI- No N/V/D, abd pain    Objective   Objective     Vital Signs:   Temp:  [96.8 °F (36 °C)-99 °F (37.2 °C)] 98.4 °F (36.9 °C)  Heart Rate:  [71-96] 96  Resp:  [16-20] 17  BP: (104-207)/(46-83) 104/55        Physical Exam:  Constitutional: No acute distress, awake, alert  HENT: NC, mucous membranes moist, bandage over R eye   Respiratory: Clear to auscultation bilaterally, respiratory effort normal   Cardiovascular: RRR, no murmurs, rubs, or gallops  Gastrointestinal: Positive bowel sounds, soft, nontender, nondistended  Musculoskeletal: No bilateral ankle edema, LUE bandage intact, nerve cath in place, RUE in sling   Psychiatric: Appropriate affect, cooperative  Neurologic: Oriented x 3, no focal deficits   Skin: No rashes    Results Reviewed:  Results from last 7 days   Lab Units 21   WBC 10*3/mm3 15.84*   HEMOGLOBIN g/dL 12.1   HEMATOCRIT % 35.5   PLATELETS 10*3/mm3 248     Results from last 7 days   Lab Units 21   SODIUM mmol/L 133*   POTASSIUM mmol/L 4.2   CHLORIDE mmol/L 101   CO2 mmol/L 23.0   BUN mg/dL 11   CREATININE mg/dL 0.50*   GLUCOSE mg/dL 118*   CALCIUM mg/dL 9.2   ALT (SGPT) U/L 17   AST (SGOT) U/L 26     Estimated Creatinine Clearance: 51.9 mL/min (A) (by C-G formula based on SCr of 0.5 mg/dL (L)).    Microbiology Results Abnormal     Procedure Component Value - Date/Time    Urine Culture - Urine, Urine, Clean  Catch [[id number]]  (Abnormal) Collected: 02/13/21 1719    Lab Status: Preliminary result Specimen: Urine, Clean Catch Updated: 02/14/21 1237     Urine Culture >100,000 CFU/mL Gram Negative Bacilli    COVID PRE-OP / PRE-PROCEDURE SCREENING ORDER (NO ISOLATION) - Swab, Nasopharynx [[id number]]  (Normal) Collected: 02/12/21 2352    Lab Status: Final result Specimen: Swab from Nasopharynx Updated: 02/13/21 0404    Narrative:      The following orders were created for panel order COVID PRE-OP / PRE-PROCEDURE SCREENING ORDER (NO ISOLATION) - Swab, Nasopharynx.  Procedure                               Abnormality         Status                     ---------                               -----------         ------                     COVID-19 and FLU A/B PCR...[[id number]]  Normal              Final result                 Please view results for these tests on the individual orders.    COVID-19 and FLU A/B PCR - Swab, Nasopharynx [[id number]]  (Normal) Collected: 02/12/21 2352    Lab Status: Final result Specimen: Swab from Nasopharynx Updated: 02/13/21 0404     COVID19 Not Detected     Influenza A PCR Not Detected     Influenza B PCR Not Detected    Narrative:      Fact sheet for providers: https://www.fda.gov/media/287415/download    Fact sheet for patients: https://www.fda.gov/media/458315/download    Test performed by PCR.          Imaging Results (Last 24 Hours)     Procedure Component Value Units Date/Time    FL C Arm During Surgery [[id number]] Resulted: 02/14/21 0806     Updated: 02/14/21 1114    CT Upper Extremity Left Without Contrast [[id number]] Collected: 02/13/21 1358     Updated: 02/13/21 1416    Narrative:      EXAMINATION: CT UPPER EXTREMITY LEFT WO CONTRAST-, CT UPPER EXTREMITY  RIGHT WO CONTRAST-      INDICATION: Fracture, shoulder     TECHNIQUE: Multiplanar CT imaging of the right and left shoulders was  performed. 3-D reconstructed images were performed to facilitate  diagnostic accuracy and preoperative  planning.     The radiation dose reduction device was turned on for each scan per the  ALARA (As Low as Reasonably Achievable) protocol.     COMPARISON: Radiographs of the left humerus and right shoulder performed  1 day prior.     FINDINGS:      Left shoulder:  Comminuted, impacted fracture of the surgical neck of the left humerus.  There is approximately 1.5 cm of override and approximately 1.2 cm of  medial subluxation of the humerus. Fracture line extends through the  greater tuberosity with changes mildly displaced. The articular surface  of the humeral head remains aligned with the glenoid. Large subchondral  cyst is noted. The acromioclavicular joint is maintained. Extensive soft  tissue swelling surrounding the shoulder joint with high density  material within the medullary cavity likely representing blood products.  No other acute findings within the imaged left chest wall.     Right shoulder:      Comminuted, impacted fracture extending through the surgical neck of  the right humerus. Approximately 2 cm of override and 1.3 cm a medial  subluxation of the humerus. Comminuted fracture extending through the  greater tuberosity which is mildly displaced. Acromioclavicular joint is  maintained. Humeral head is markedly externally rotated but remains in  contact with the glenoid. No other fracture of the right chest wall.  Extensive surrounding soft tissue swelling high density material within  the medullary canal consistent with hematoma.             Impression:      1. Comminuted impacted fracture extending through the surgical neck of  the left humerus with medial displacement. Additional mildly displaced  fracture involving the greater tuberosity.     2. Comminuted impacted fracture extending through the surgical neck of  the right humerus with medial displacement. Mildly displaced fracture  involving the greater tuberosity.              This report was finalized on 2/13/2021 2:12 PM by Cristobal Gibbs.       CT  Upper Extremity Right Without Contrast [[id number]] Collected: 02/13/21 1358     Updated: 02/13/21 1416    Narrative:      EXAMINATION: CT UPPER EXTREMITY LEFT WO CONTRAST-, CT UPPER EXTREMITY  RIGHT WO CONTRAST-      INDICATION: Fracture, shoulder     TECHNIQUE: Multiplanar CT imaging of the right and left shoulders was  performed. 3-D reconstructed images were performed to facilitate  diagnostic accuracy and preoperative planning.     The radiation dose reduction device was turned on for each scan per the  ALARA (As Low as Reasonably Achievable) protocol.     COMPARISON: Radiographs of the left humerus and right shoulder performed  1 day prior.     FINDINGS:      Left shoulder:  Comminuted, impacted fracture of the surgical neck of the left humerus.  There is approximately 1.5 cm of override and approximately 1.2 cm of  medial subluxation of the humerus. Fracture line extends through the  greater tuberosity with changes mildly displaced. The articular surface  of the humeral head remains aligned with the glenoid. Large subchondral  cyst is noted. The acromioclavicular joint is maintained. Extensive soft  tissue swelling surrounding the shoulder joint with high density  material within the medullary cavity likely representing blood products.  No other acute findings within the imaged left chest wall.     Right shoulder:      Comminuted, impacted fracture extending through the surgical neck of  the right humerus. Approximately 2 cm of override and 1.3 cm a medial  subluxation of the humerus. Comminuted fracture extending through the  greater tuberosity which is mildly displaced. Acromioclavicular joint is  maintained. Humeral head is markedly externally rotated but remains in  contact with the glenoid. No other fracture of the right chest wall.  Extensive surrounding soft tissue swelling high density material within  the medullary canal consistent with hematoma.             Impression:      1. Comminuted impacted  fracture extending through the surgical neck of  the left humerus with medial displacement. Additional mildly displaced  fracture involving the greater tuberosity.     2. Comminuted impacted fracture extending through the surgical neck of  the right humerus with medial displacement. Mildly displaced fracture  involving the greater tuberosity.              This report was finalized on 2/13/2021 2:12 PM by Cristobal Gibbs.                  I have reviewed the medications:  Scheduled Meds:amLODIPine, 10 mg, Oral, Daily  ceFAZolin, 2 g, Intravenous, Q8H  cefTRIAXone, 1 g, Intravenous, Q24H  gabapentin, 300 mg, Oral, Daily With Breakfast  levETIRAcetam, 1,000 mg, Oral, Nightly  levETIRAcetam, 1,500 mg, Oral, QAM  lisinopril, 20 mg, Oral, Q24H  polyethylene glycol, 17 g, Oral, Daily      Continuous Infusions:lactated ringers, 9 mL/hr, Last Rate: 9 mL/hr (02/14/21 0810)  Ropivacine HCl-NaCl, 6 mL/hr, Last Rate: 4 mL/hr (02/14/21 1115)  sodium chloride, 50 mL/hr      PRN Meds:.•  acetaminophen **OR** acetaminophen  •  HYDROcodone-acetaminophen  •  HYDROcodone-acetaminophen  •  HYDROmorphone **AND** naloxone  •  ondansetron **OR** ondansetron  •  senna-docusate sodium    Assessment/Plan   Assessment & Plan     Active Hospital Problems    Diagnosis  POA   • Humerus fracture-bilateral [S42.309A]  Yes   • Idiopathic scoliosis of thoracolumbar spine [M41.25]  Yes   • Left hip pain [M25.552]  Yes   • Essential hypertension [I10]  Yes   • Seizure (CMS/HCC) [R56.9]  Yes   • Neck pain [M54.2]  Yes      Resolved Hospital Problems   No resolved problems to display.        Brief Hospital Course to date:  Keely Haider is a 75 y.o. female with past medical history only significant for hypertension, osteoarthritis osteoporosis, scoliosis, chronic left hip pain as well as a seizure disorder who had a fall on the ice and broke both of her humeri.     Bilateral humeral fracture  -Dr. Loza following, s/p ORIF for L proximal humerus Fx   -R  humerus Fx to be treated with sling  -Pain control  -PT/OT  -Per ortho note, will remain in the sling until she is seen back in clinic at approx 10 days postop. She can come out of the sling for elbow wrist and hand ROM.  They will start pendulum exercises at  first postop visit.       Seizure disorder  -Continue Keppra     Hypertension  -BP elevated yesterday  -Improved today  -Continue Norvasc, Lisinopril     Hyponatremia  -mild, monitor with BMP in AM    UTI  -Urine Cx pending  -Continue Rocephin 1g IV daily     Chronic osteoarthritis and osteoporosis       DVT prophylaxis: Mechanical due to surgery    Disposition: I expect the patient to be discharged TBD.     CODE STATUS:   Code Status and Medical Interventions:   Ordered at: 02/12/21 2159     Code Status:    CPR     Medical Interventions (Level of Support Prior to Arrest):    Full       Trang Jean DO  02/14/21

## 2021-02-14 NOTE — PLAN OF CARE
Goal Outcome Evaluation:  Patient alert and oriented. Pt on 2L via nasal cannula. Dialuidid given x1 to control pain. Pt had elevated blood pressure through night. Lopressor 2.5mg IV given at 2019 and 5mg iv given at 0405. Pt voiding well throughout night.

## 2021-02-14 NOTE — OP NOTE
Operative Report ORIF Proximal Humerus Fracture    Preoperative Diagnosis:left proximal humerus fracture, Neer 3 part    Postoperative Diagnosis: Same    Procedure: ORIF left proximal humerus fracture    Surgeon: Dr. Timoteo Loza    Assistant: Jay Jacob PA-C    ** Please note the physician assistant was medically necessary to assist with positioning retraction, arm positioning, care of soft tissues and closure  EBL:   250 cc    Anesthesia: GETA with interscalene brachial plexus block    Implants:   1)  Gruvie Device [employer] 3 hole left locking proximal humeral plate  2)  Cookie 10cc Calcium Phosphate  3)  3 Arthrex #2 fiberwires    Indications:Patient  is a 76 yo female  who suffered a left proximal humerus fracture.  Risks and benefits of operative versus nonoperative management discussed.  Patient elected to proceed with surgery. Informed consent obtained.       Description: Patient was met in the preoperative holding area and confirmed by name, medical record number, .  The operative site was correctly identified. Patient was then met by anesthesia and cleared for surgery.  An interscalene brachial plexus block was then performed.  Patient was then brought to the operating room suite and and placed supine on the OR table.  Patient underwent smooth induction with GETA.  Patient then positioned in the beach chair position. Patient’s left shoulder and left upper extremity prepped and draped in sterile fashion. Preoperative prophylactic antibiotic given with 2 g of Ancef.  A timeout was then observed    An approx 10cm skin incision was made extending from the anterolateral acromion distall toward the deltoid insertion.  The raphe between the anterior and middle deltoid was split and opened.  Deep retractors were then placed. The anterior branch of the axillary nerve was dissected free and a vessel loop placed around it.  The nerve was visualized throughout the case and at closure checked to ensure it was safe  and free from injury.   Fracture was then debrided of hematoma.   Traction stitches were then placed in the rotator cuff.   A reduction maneuver was then performed the fracture was pinned in position, Evergreen calcium phosphate was placed in a large metaphyseal void in the proximal humerus, and a 3 hole locking plate was applied.  Wires were used to provisionally affix the plate.  Position of the plate and the fracture reduction was confirmed with C arm.  A series of locking and nonlocking screws were then placed.  Position of the screws were confirmed with C arm making sure that no intra-articular penetration occurred.  The traction stitches were then placed through the plate and tied.     We then irrigated with sterile saline. The deltoid was closed with 2 running locking 0-vicryl sutures.  We then closed the dermal layer with 2-0 vicryl and the skin with interrupted 3-0 nylon.  A sterile dressing was applied.      Patient tolerated the procedure well.  He was extubated and placed in a sling.  At the end of the case all sponge and instrument counts were correct x 2.          Plan:  Patient will be admitted for postoperative monitoring. They will remain in the sling until he is seen back in clinic at approx 10 days postop. They can come out of the sling for elbow wrist and hand ROM.     They will start pendulum exercises at  first postop visit.         Timoteo Loza MD, MS

## 2021-02-15 LAB
ANION GAP SERPL CALCULATED.3IONS-SCNC: 6 MMOL/L (ref 5–15)
BACTERIA SPEC AEROBE CULT: ABNORMAL
BASOPHILS # BLD AUTO: 0.01 10*3/MM3 (ref 0–0.2)
BASOPHILS NFR BLD AUTO: 0.1 % (ref 0–1.5)
BUN SERPL-MCNC: 7 MG/DL (ref 8–23)
BUN/CREAT SERPL: 17.5 (ref 7–25)
CALCIUM SPEC-SCNC: 8.8 MG/DL (ref 8.6–10.5)
CHLORIDE SERPL-SCNC: 101 MMOL/L (ref 98–107)
CO2 SERPL-SCNC: 25 MMOL/L (ref 22–29)
CREAT SERPL-MCNC: 0.4 MG/DL (ref 0.57–1)
DEPRECATED RDW RBC AUTO: 42.7 FL (ref 37–54)
EOSINOPHIL # BLD AUTO: 0.1 10*3/MM3 (ref 0–0.4)
EOSINOPHIL NFR BLD AUTO: 1.1 % (ref 0.3–6.2)
ERYTHROCYTE [DISTWIDTH] IN BLOOD BY AUTOMATED COUNT: 12.4 % (ref 12.3–15.4)
GFR SERPL CREATININE-BSD FRML MDRD: >150 ML/MIN/1.73
GLUCOSE SERPL-MCNC: 115 MG/DL (ref 65–99)
HCT VFR BLD AUTO: 27.4 % (ref 34–46.6)
HGB BLD-MCNC: 8.8 G/DL (ref 12–15.9)
IMM GRANULOCYTES # BLD AUTO: 0.04 10*3/MM3 (ref 0–0.05)
IMM GRANULOCYTES NFR BLD AUTO: 0.4 % (ref 0–0.5)
LYMPHOCYTES # BLD AUTO: 1.17 10*3/MM3 (ref 0.7–3.1)
LYMPHOCYTES NFR BLD AUTO: 12.4 % (ref 19.6–45.3)
MCH RBC QN AUTO: 30 PG (ref 26.6–33)
MCHC RBC AUTO-ENTMCNC: 32.1 G/DL (ref 31.5–35.7)
MCV RBC AUTO: 93.5 FL (ref 79–97)
MONOCYTES # BLD AUTO: 1.04 10*3/MM3 (ref 0.1–0.9)
MONOCYTES NFR BLD AUTO: 11 % (ref 5–12)
NEUTROPHILS NFR BLD AUTO: 7.08 10*3/MM3 (ref 1.7–7)
NEUTROPHILS NFR BLD AUTO: 75 % (ref 42.7–76)
NRBC BLD AUTO-RTO: 0 /100 WBC (ref 0–0.2)
PLATELET # BLD AUTO: 200 10*3/MM3 (ref 140–450)
PMV BLD AUTO: 10 FL (ref 6–12)
POTASSIUM SERPL-SCNC: 3.9 MMOL/L (ref 3.5–5.2)
RBC # BLD AUTO: 2.93 10*6/MM3 (ref 3.77–5.28)
SODIUM SERPL-SCNC: 132 MMOL/L (ref 136–145)
WBC # BLD AUTO: 9.44 10*3/MM3 (ref 3.4–10.8)

## 2021-02-15 PROCEDURE — 97110 THERAPEUTIC EXERCISES: CPT

## 2021-02-15 PROCEDURE — 99233 SBSQ HOSP IP/OBS HIGH 50: CPT | Performed by: NURSE PRACTITIONER

## 2021-02-15 PROCEDURE — 85025 COMPLETE CBC W/AUTO DIFF WBC: CPT | Performed by: ORTHOPAEDIC SURGERY

## 2021-02-15 PROCEDURE — 25010000002 CEFTRIAXONE PER 250 MG: Performed by: FAMILY MEDICINE

## 2021-02-15 PROCEDURE — 97535 SELF CARE MNGMENT TRAINING: CPT

## 2021-02-15 PROCEDURE — 25010000002 CEFAZOLIN PER 500 MG: Performed by: ORTHOPAEDIC SURGERY

## 2021-02-15 PROCEDURE — 97530 THERAPEUTIC ACTIVITIES: CPT

## 2021-02-15 PROCEDURE — 80048 BASIC METABOLIC PNL TOTAL CA: CPT | Performed by: ORTHOPAEDIC SURGERY

## 2021-02-15 PROCEDURE — 25010000002 HYDROMORPHONE PER 4 MG: Performed by: ORTHOPAEDIC SURGERY

## 2021-02-15 PROCEDURE — 97162 PT EVAL MOD COMPLEX 30 MIN: CPT

## 2021-02-15 RX ORDER — AMOXICILLIN 250 MG
2 CAPSULE ORAL 2 TIMES DAILY PRN
Status: DISCONTINUED | OUTPATIENT
Start: 2021-02-15 | End: 2021-02-18 | Stop reason: HOSPADM

## 2021-02-15 RX ORDER — MAGNESIUM CARB/ALUMINUM HYDROX 105-160MG
296 TABLET,CHEWABLE ORAL AS NEEDED
Status: DISCONTINUED | OUTPATIENT
Start: 2021-02-15 | End: 2021-02-18 | Stop reason: HOSPADM

## 2021-02-15 RX ORDER — BISACODYL 10 MG
10 SUPPOSITORY, RECTAL RECTAL DAILY PRN
Status: DISCONTINUED | OUTPATIENT
Start: 2021-02-15 | End: 2021-02-18 | Stop reason: HOSPADM

## 2021-02-15 RX ADMIN — LEVETIRACETAM 1500 MG: 750 TABLET, FILM COATED ORAL at 09:01

## 2021-02-15 RX ADMIN — HYDROMORPHONE HYDROCHLORIDE 0.5 MG: 1 INJECTION, SOLUTION INTRAMUSCULAR; INTRAVENOUS; SUBCUTANEOUS at 06:21

## 2021-02-15 RX ADMIN — AMLODIPINE BESYLATE 10 MG: 10 TABLET ORAL at 09:01

## 2021-02-15 RX ADMIN — HYDROCODONE BITARTRATE AND ACETAMINOPHEN 1 TABLET: 5; 325 TABLET ORAL at 21:33

## 2021-02-15 RX ADMIN — Medication 1 TABLET: at 09:01

## 2021-02-15 RX ADMIN — HYDROCODONE BITARTRATE AND ACETAMINOPHEN 1 TABLET: 5; 325 TABLET ORAL at 09:10

## 2021-02-15 RX ADMIN — ATORVASTATIN CALCIUM 20 MG: 20 TABLET, FILM COATED ORAL at 09:01

## 2021-02-15 RX ADMIN — SODIUM CHLORIDE 1 G: 900 INJECTION INTRAVENOUS at 13:53

## 2021-02-15 RX ADMIN — POLYETHYLENE GLYCOL 3350 17 G: 17 POWDER, FOR SOLUTION ORAL at 09:00

## 2021-02-15 RX ADMIN — LEVETIRACETAM 1000 MG: 500 TABLET, FILM COATED ORAL at 20:18

## 2021-02-15 RX ADMIN — LOSARTAN POTASSIUM: 50 TABLET, FILM COATED ORAL at 09:00

## 2021-02-15 RX ADMIN — CITALOPRAM HYDROBROMIDE 10 MG: 20 TABLET ORAL at 09:01

## 2021-02-15 RX ADMIN — HYDROCODONE BITARTRATE AND ACETAMINOPHEN 1 TABLET: 5; 325 TABLET ORAL at 05:27

## 2021-02-15 RX ADMIN — ASPIRIN 81 MG: 81 TABLET, CHEWABLE ORAL at 09:00

## 2021-02-15 RX ADMIN — CEFAZOLIN 2 G: 10 INJECTION, POWDER, FOR SOLUTION INTRAVENOUS at 00:20

## 2021-02-15 RX ADMIN — GABAPENTIN 300 MG: 300 CAPSULE ORAL at 09:01

## 2021-02-15 NOTE — PLAN OF CARE
Problem: Adult Inpatient Plan of Care  Goal: Plan of Care Review  Recent Flowsheet Documentation  Taken 2/15/2021 1059 by Rosalinda Elena, PT  Progress: no change  Outcome Summary: PT initial evaluation completed. Pt limited by pain, decreased functional endurance, balance deficit, generalized weakness, and NWB status of BUE. Pt amb 125ft with CGA demonstrating mild unsteadiness that improved with continued gait. One standing rest break required d/t SOA, with pt desaturating to 88%. Oxygen increased to >90% with PLB. CGA required for all observed mobility. Pt could benefit from continued skilled IP PT to improve functional mobility and increase indepdenc. D/c rec for IRF.

## 2021-02-15 NOTE — THERAPY EVALUATION
Patient Name: Keely Haider  : 1945    MRN: [id number]                              Today's Date: 2/15/2021       Admit Date: 2021    Visit Dx:     ICD-10-CM ICD-9-CM   1. Closed fracture of both humeri, initial encounter  S42.301A 819.0    S42.302A    2. Closed fracture of nasal bone, initial encounter  S02.2XXA 802.0   3. Fall due to slipping on ice or snow, initial encounter  W00.9XXA E885.9     Patient Active Problem List   Diagnosis   • Humerus fracture-bilateral   • Idiopathic scoliosis of thoracolumbar spine   • Left hip pain   • Essential hypertension   • Seizure (CMS/HCC)   • Neck pain     Past Medical History:   Diagnosis Date   • Hypertension    • Osteoarthritis of left hip    • Osteoporosis    • Scoliosis    • Seizures (CMS/HCC)      Past Surgical History:   Procedure Laterality Date   • ABDOMINAL HYSTERECTOMY       General Information     Row Name 02/15/21 1053          Physical Therapy Time and Intention    Document Type  evaluation  -AY     Mode of Treatment  physical therapy  -AY     Row Name 02/15/21 1053          General Information    Patient Profile Reviewed  yes  -AY     Prior Level of Function  independent:;all household mobility;community mobility;transfer;gait pt is caregiver for  with dementia  -AY     Existing Precautions/Restrictions  fall;non-weight bearing;left;right;shoulder;other (see comments) CLAYTON NWB, CLAYTON in donjoy ultra II sling, interscalene nerve catheter LUE  -AY     Barriers to Rehab  medically complex  -AY     Row Name 02/15/21 1053          Living Environment    Lives With  spouse;other (see comments) Caregiver for  with dementia  -AY     Row Name 02/15/21 1053          Home Main Entrance    Number of Stairs, Main Entrance  none  -AY     Stair Railings, Main Entrance  none  -AY     Row Name 02/15/21 1053          Stairs Within Home, Primary    Stairs, Within Home, Primary  Pt  just moved into an assisted livng facility. Plan is for her to  move into same facility upon discharge. Her current home has no stairs.  -AY     Number of Stairs, Within Home, Primary  none  -AY     Stair Railings, Within Home, Primary  none  -AY     Row Name 02/15/21 1053          Cognition    Orientation Status (Cognition)  oriented x 4  -AY     Row Name 02/15/21 1053          Safety Issues, Functional Mobility    Safety Issues Affecting Function (Mobility)  insight into deficits/self-awareness;safety precaution awareness;safety precautions follow-through/compliance;sequencing abilities  -AY     Impairments Affecting Function (Mobility)  balance;endurance/activity tolerance;pain;strength;range of motion (ROM);sensation/sensory awareness;postural/trunk control  -AY       User Key  (r) = Recorded By, (t) = Taken By, (c) = Cosigned By    Initials Name Provider Type    AY Rosalinda Elena, PT Physical Therapist        Mobility     Row Name 02/15/21 1054          Bed Mobility    Comment (Bed Mobility)  Pt recieved and left up in chair  -AY     Row Name 02/15/21 1054          Transfers    Comment (Transfers)  Verbal cueing for use of abdominal muscles to stand and weight shift to assist, d/t NWB status. Pt limited initally by dizziness upon standing, which resolved within 10 seconds of standing.  -AY     Row Name 02/15/21 1054          Sit-Stand Transfer    Sit-Stand [place] (Transfers)  contact guard;verbal cues  -AY     Assistive Device (Sit-Stand Transfers)  other (see comments) gait belt  -AY     Row Name 02/15/21 1054          Gait/Stairs (Locomotion)    [place] Level (Gait)  contact guard;1 person assist  -AY     Assistive Device (Gait)  other (see comments) gait belt  -AY     Distance in Feet (Gait)  125  -AY     Deviations/Abnormal Patterns (Gait)  shalonda decreased;gait speed decreased;bilateral deviations;other (see comments);stride length decreased increased lateral sway with ambulation  -AY     Bilateral Gait Deviations  heel strike decreased  -AY     Comment  (Gait/Stairs)  Pt ambulated 125ft with CGA without AD. Pt demonstrated step through gait pattern with increased lateal sway and decreased shalonda/stride length. Verbal cueing for posture and heel strike provided. Pt gait mechanics improved with continued ambulation. One standing rest break required half way with cueing for PLB. Pt oxygen decreased to 88% on RA, but recovered to >90% quickly with PLB. Gait distance limited by fatigue and SOA.  -AY     Row Name 02/15/21 1054          Mobility    Extremity Weight-bearing Status  left upper extremity;right upper extremity  -AY     Left Upper Extremity (Weight-bearing Status)  non weight-bearing (NWB)  -AY     Right Upper Extremity (Weight-bearing Status)  non weight-bearing (NWB)  -AY       User Key  (r) = Recorded By, (t) = Taken By, (c) = Cosigned By    Initials Name Provider Type    Rosalinda Mccall PT Physical Therapist        Obj/Interventions     Row Name 02/15/21 1058          Range of Motion Comprehensive    General Range of Motion  bilateral lower extremity ROM WFL  -AY     Row Name 02/15/21 1058          Strength Comprehensive (MMT)    Comment, General Manual Muscle Testing (MMT) Assessment  BLE grossly 4+/5  -AY     Row Name 02/15/21 1058          Balance    Balance Assessment  sitting static balance;sitting dynamic balance;standing static balance;standing dynamic balance  -AY     Static Sitting Balance  WFL;sitting in chair;supported  -AY     Dynamic Sitting Balance  WFL;sitting in chair;unsupported  -AY     Static Standing Balance  WFL;supported;standing  -AY     Dynamic Standing Balance  mild impairment;unsupported;standing  -AY     Comment, Balance  mild unsteadiness noted with initial standing.  -AY     Row Name 02/15/21 1058          Sensory Assessment (Somatosensory)    Sensory Assessment (Somatosensory)  LE sensation intact  -AY       User Key  (r) = Recorded By, (t) = Taken By, (c) = Cosigned By    Initials Name Provider Type    Rosalinda Mccall PT  Physical Therapist        Goals/Plan     Row Name 02/15/21 1104          Bed Mobility Goal 1 (PT)    Activity/Assistive Device (Bed Mobility Goal 1, PT)  supine to sit  -AY     [place] Level/Cues Needed (Bed Mobility Goal 1, PT)  minimum assist (75% or more patient effort);1 person assist  -AY     Time Frame (Bed Mobility Goal 1, PT)  long term goal (LTG);5 days  -AY     Progress/Outcomes (Bed Mobility Goal 1, PT)  goal ongoing  -AY     Row Name 02/15/21 1104          Transfer Goal 1 (PT)    Activity/Assistive Device (Transfer Goal 1, PT)  sit-to-stand/stand-to-sit  -AY     [place] Level/Cues Needed (Transfer Goal 1, PT)  independent  -AY     Time Frame (Transfer Goal 1, PT)  long term goal (LTG);5 days  -AY     Progress/Outcome (Transfer Goal 1, PT)  goal ongoing  -AY     Row Name 02/15/21 1104          Gait Training Goal 1 (PT)    Activity/Assistive Device (Gait Training Goal 1, PT)  gait (walking locomotion)  -AY     [place] Level (Gait Training Goal 1, PT)  independent  -AY     Distance (Gait Training Goal 1, PT)  400  -AY     Time Frame (Gait Training Goal 1, PT)  long term goal (LTG);5 days  -AY     Progress/Outcome (Gait Training Goal 1, PT)  goal ongoing  -AY       User Key  (r) = Recorded By, (t) = Taken By, (c) = Cosigned By    Initials Name Provider Type    Rosalinda Mccall, PT Physical Therapist        Clinical Impression     Row Name 02/15/21 1059          Pain    Additional Documentation  Pain Scale: Numbers Pre/Post-Treatment (Group)  -AY     Row Name 02/15/21 1059          Pain Scale: Numbers Pre/Post-Treatment    Pretreatment Pain Rating  3/10  -AY     Posttreatment Pain Rating  3/10  -AY     Pain Location - Side  Left  -AY     Pain Location - Orientation  lateral  -AY     Pain Location  neck  -AY     Pre/Posttreatment Pain Comment  pt reported minor neck pain. RN aware and managing.  -AY     Pain Intervention(s)  Ambulation/increased activity;Repositioned  -AY     Row Name 02/15/21  1059          Plan of Care Review    Progress  no change  -AY     Outcome Summary  PT initial evaluation completed. Pt limited by pain, decreased functional endurance, balance deficit, generalized weakness, and NWB status of BUE. Pt amb 125ft with CGA demonstrating mild unsteadiness that improved with continued gait. One standing rest break required d/t SOA, with pt desaturating to 88%. Oxygen increased to >90% with PLB. CGA required for all observed mobility. Pt could benefit from continued skilled IP PT to improve functional mobility and increase indepdenc. D/c rec for IRF.  -AY     Row Name 02/15/21 1059          Therapy Assessment/Plan (PT)    Rehab Potential (PT)  good, to achieve stated therapy goals  -AY     Criteria for Skilled Interventions Met (PT)  yes;meets criteria;skilled treatment is necessary  -AY     Row Name 02/15/21 1059          Vital Signs    Pre Systolic BP Rehab  -- VSS. RN cleared for PT eval.  -AY     Pretreatment Heart Rate (beats/min)  71  -AY     Posttreatment Heart Rate (beats/min)  70  -AY     Pre SpO2 (%)  95  -AY     O2 Delivery Pre Treatment  room air  -AY     Intra SpO2 (%)  88  -AY     O2 Delivery Intra Treatment  room air  -AY     Post SpO2 (%)  93  -AY     O2 Delivery Post Treatment  room air  -AY     Pre Patient Position  Sitting  -AY     Intra Patient Position  Standing  -AY     Post Patient Position  Sitting  -AY     Row Name 02/15/21 1059          Positioning and Restraints    Pre-Treatment Position  sitting in chair/recliner  -AY     Post Treatment Position  chair  -AY     In Chair  notified nsg;reclined;sitting;call light within reach;encouraged to call for assist;exit alarm on;RUE elevated;LUE elevated;waffle cushion;legs elevated  -AY       User Key  (r) = Recorded By, (t) = Taken By, (c) = Cosigned By    Initials Name Provider Type    AY Rosalinda Elena, PT Physical Therapist        Outcome Measures     Row Name 02/15/21 110          How much help from another person do  you currently need...    Turning from your back to your side while in flat bed without using bedrails?  3  -AY     Moving from lying on back to sitting on the side of a flat bed without bedrails?  2  -AY     Moving to and from a bed to a chair (including a wheelchair)?  3  -AY     Standing up from a chair using your arms (e.g., wheelchair, bedside chair)?  3  -AY     Climbing 3-5 steps with a railing?  2  -AY     To walk in hospital room?  3  -AY     AM-PAC 6 Clicks Score (PT)  16  -AY     Row Name 02/15/21 1105          Functional Assessment    Outcome Measure Options  AM-PAC 6 Clicks Basic Mobility (PT)  -AY       User Key  (r) = Recorded By, (t) = Taken By, (c) = Cosigned By    Initials Name Provider Type    Rosalinda Mccall PT Physical Therapist        Physical Therapy Education                 Title: PT OT SLP Therapies (In Progress)     Topic: Physical Therapy (Done)     Point: Mobility training (Done)     Learning Progress Summary           Patient Acceptance, E,TB, VU,NR by AY at 2/15/2021 1105    Acceptance, E,TB, VU,NR by AY at 2/15/2021 1105    Comment: pt educated on weight bearing status, proper technique for transfers, gait mechanics, purlsed lip breathing technique, HEP, and POC progression.                   Point: Home exercise program (Done)     Learning Progress Summary           Patient Acceptance, E,TB, VU,NR by AY at 2/15/2021 1105    Acceptance, E,TB, VU,NR by AY at 2/15/2021 1105    Comment: pt educated on weight bearing status, proper technique for transfers, gait mechanics, purlsed lip breathing technique, HEP, and POC progression.                   Point: Body mechanics (Done)     Learning Progress Summary           Patient Acceptance, E,TB, VU,NR by AY at 2/15/2021 1105    Acceptance, E,TB, VU,NR by AY at 2/15/2021 1105    Comment: pt educated on weight bearing status, proper technique for transfers, gait mechanics, purlsed lip breathing technique, HEP, and POC progression.                    Point: Precautions (Done)     Learning Progress Summary           Patient Acceptance, E,TB, VU,NR by AY at 2/15/2021 1105    Acceptance, E,TB, VU,NR by AY at 2/15/2021 1105    Comment: pt educated on weight bearing status, proper technique for transfers, gait mechanics, purlsed lip breathing technique, HEP, and POC progression.                               User Key     Initials Effective Dates Name Provider Type Discipline    AY 11/10/20 -  Rosalinda Elena PT Physical Therapist PT              PT Recommendation and Plan  Planned Therapy Interventions (PT): balance training, bed mobility training, gait training, home exercise program, patient/family education, neuromuscular re-education, postural re-education, ROM (range of motion), strengthening, stretching, transfer training  Progress: no change  Outcome Summary: PT initial evaluation completed. Pt limited by pain, decreased functional endurance, balance deficit, generalized weakness, and NWB status of BUE. Pt amb 125ft with CGA demonstrating mild unsteadiness that improved with continued gait. One standing rest break required d/t SOA, with pt desaturating to 88%. Oxygen increased to >90% with PLB. CGA required for all observed mobility. Pt could benefit from continued skilled IP PT to improve functional mobility and increase indepdenc. D/c rec for IRF.     Time Calculation:   PT Charges     Row Name 02/15/21 1107             Time Calculation    Start Time  1030  -AY      PT Received On  02/15/21  -AY      PT Goal Re-Cert Due Date  02/25/21  -AY        User Key  (r) = Recorded By, (t) = Taken By, (c) = Cosigned By    Initials Name Provider Type    AY Rosalinda Elena PT Physical Therapist        Therapy Charges for Today     Code Description Service Date Service Provider Modifiers Qty    [id number]  PT EVAL MOD COMPLEXITY 4 2/15/2021 Rosalinda Elena, PT GP 1          PT G-Codes  Outcome Measure Options: AM-PAC 6 Clicks Basic Mobility (PT)  AM-PAC 6 Clicks  Score (PT): 16  AM-PAC 6 Clicks Score (OT): 9    Rosalinda Elena, PT  2/15/2021

## 2021-02-15 NOTE — PLAN OF CARE
"  Problem: Adult Inpatient Plan of Care  Goal: Plan of Care Review  Recent Flowsheet Documentation  Taken 2/15/2021 1020 by Citlalli Elena, ADAMS  Progress: improving  Plan of Care Reviewed With: patient  Outcome Summary: Pt alert, O x 4 and agreeable to OT tx. Pt received UIC for improved position and comfort thus bed mobility not assessed this date. Pt completed UBD including gown exchange and B slings with max A to grossly dep with pt A limited to R elbow flex/ext to assist in managing strap, garment at L side. Pt max A for UBB (B axilla care). Pt grossly max A for self feeding. OT collectively reviewed optimal positioning, tech, strategy in order to maintain B UE precautions and maximize I. Pt is limited in her I given the nature of B humeral fx. Reiterated the need to be able to guide, lead, direct care provided based on training provided. Reviewed no showering while nerve cath at LUE still in place and made recommendations for self feeding to increase I and reduce dropping, slipping, coordination deficits. Reviewed wear schedule for slings and optimal position, adjustment for \"jut right fit\". Pt completed HEP encompassing BUEs i.e. elbow/wrist/hand ROM with gross AROM at RUE and most of LUE except L elbow flexion/ext in which pt req'd AAROM dt decreased motor control. Pt completed 1 set x 10 reps each. Pt stood with CGA and gait belt for cloth mgmt purposes yet further fxl mobility limited by pt request to return to sitting given L lateral neck pain and subsequent intermittent LH feeling. Nurse and PT aware. Pt would still benefit from IRF prior to return home given remaining underlying impairments and impact on fxn.     "

## 2021-02-15 NOTE — PROGRESS NOTES
[facility] Medicine Services  PROGRESS NOTE    Patient Name: Keely Haider  : 1945  MRN: [id number]    Date of Admission: 2021  Primary Care Physician: Provider, No Known    Subjective   Subjective   CC:  F/U bilateral humerus fractures     HPI:  Patient sitting up in a chair with bilateral slings in place.  Patient states she is having a lot of left arm pain earlier today and requested some pain medication and states she is tolerating pain well now.  Patient trying to eat by grabbing food from her tray.  Patient states that she has not had a bowel movement, so as needed meds were discussed.  Waiting for placement.    ROS:  Gen- No fevers, chills  CV- No chest pain, palpitations  Resp- No cough, dyspnea  GI- No N/V/D, abd pain  All other systems have been reviewed and the pertinent positives and negatives are listed above in the HPI or ROS    Objective   Objective   Vital Signs:   Temp:  [97.4 °F (36.3 °C)-98.6 °F (37 °C)] 98.3 °F (36.8 °C)  Heart Rate:  [] 85  Resp:  [16-18] 18  BP: (104-151)/(43-63) 121/56  Physical Exam:  Constitutional: Alert, WD, elderly female sitting up in chair attempting to eat her lunch in NAD  Eyes: EOMI, sclerae anicteric, no conjunctival injection  Head: NCAT  ENT: Jette, moist mucous membranes   Respiratory: Nonlabored, symmetrical chest expansion, CTAB  Cardiovascular: RRR, no M/R/G, +DP pulses bilaterally  Gastrointestinal: Soft, NT, ND +BS  Musculoskeletal: ECKERT; no LE edema bilaterally; arms with slings in place bilaterally; ice pack to left shoulder  Neurologic: Oriented x4, strength symmetric in all extremities, follows all commands, speech clear  Skin: No rashes on exposed skin; Right bruised eye and cheek; lateral right eye lid scab; scab on bridge of nose  Psychiatric: Pleasant and cooperative; normal affect    Results Reviewed:  Results from last 7 days   Lab Units 02/15/21  0736 21   WBC 10*3/mm3 9.44 15.84*    HEMOGLOBIN g/dL 8.8* 12.1   HEMATOCRIT % 27.4* 35.5   PLATELETS 10*3/mm3 200 248     Results from last 7 days   Lab Units 02/15/21  0736 02/12/21  2036   SODIUM mmol/L 132* 133*   POTASSIUM mmol/L 3.9 4.2   CHLORIDE mmol/L 101 101   CO2 mmol/L 25.0 23.0   BUN mg/dL 7* 11   CREATININE mg/dL 0.40* 0.50*   GLUCOSE mg/dL 115* 118*   CALCIUM mg/dL 8.8 9.2   ALT (SGPT) U/L  --  17   AST (SGOT) U/L  --  26     Estimated Creatinine Clearance: 51.9 mL/min (A) (by C-G formula based on SCr of 0.4 mg/dL (L)).    Microbiology Results Abnormal     Procedure Component Value - Date/Time    Urine Culture - Urine, Urine, Clean Catch [[id number]]  (Abnormal)  (Susceptibility) Collected: 02/13/21 1719    Lab Status: Final result Specimen: Urine, Clean Catch Updated: 02/15/21 0902     Urine Culture >100,000 CFU/mL Klebsiella pneumoniae ssp pneumoniae    Susceptibility      Klebsiella pneumoniae ssp pneumoniae     GEOVANNY     Ampicillin Resistant     Ampicillin + Sulbactam Susceptible     Cefazolin Susceptible     Cefepime Susceptible     Ceftazidime Susceptible     Ceftriaxone Susceptible     Gentamicin Susceptible     Levofloxacin Susceptible     Nitrofurantoin Susceptible     Piperacillin + Tazobactam Susceptible     Tetracycline Susceptible     Trimethoprim + Sulfamethoxazole Susceptible                    COVID PRE-OP / PRE-PROCEDURE SCREENING ORDER (NO ISOLATION) - Swab, Nasopharynx [[id number]]  (Normal) Collected: 02/12/21 2352    Lab Status: Final result Specimen: Swab from Nasopharynx Updated: 02/13/21 0404    Narrative:      The following orders were created for panel order COVID PRE-OP / PRE-PROCEDURE SCREENING ORDER (NO ISOLATION) - Swab, Nasopharynx.  Procedure                               Abnormality         Status                     ---------                               -----------         ------                     COVID-19 and FLU A/B PCR...[[id number]]  Normal              Final result                 Please view  results for these tests on the individual orders.    COVID-19 and FLU A/B PCR - Swab, Nasopharynx [[id number]]  (Normal) Collected: 02/12/21 2352    Lab Status: Final result Specimen: Swab from Nasopharynx Updated: 02/13/21 0404     COVID19 Not Detected     Influenza A PCR Not Detected     Influenza B PCR Not Detected    Narrative:      Fact sheet for providers: https://www.fda.gov/media/708646/download    Fact sheet for patients: https://www.fda.gov/media/903442/download    Test performed by PCR.          Imaging Results (Last 24 Hours)     Procedure Component Value Units Date/Time    FL C Arm During Surgery [[id number]] Collected: 02/14/21 1850     Updated: 02/14/21 1856    Narrative:      EXAMINATION: FL C ARM DURING SURGERY - 02/14/2021      INDICATION: S42.301A-Unspecified fracture of shaft of humerus, right  arm, initial encounter for closed fracture; S42.302A-Unspecified  fracture of shaft of humerus, left arm, initial encounter for closed  fracture; S02.2XXA-Fracture of nasal bones, initial encounter for closed  fracture; W00.9XXA-Unspecified fall due to ice and snow, initial  encounter. Left proximal humerus pain.     COMPARISON: None.     FINDINGS: Dictation is to record 1 minute of fluoroscopy time. Total for  each procedure images obtained show initial pin fixation of the  patient's proximal humerus fracture and subsequent lateral side plate  and multiple screw fixation with the fracture fragments appearing in  close anatomic alignment on the final two images. Please see the  procedure report for full details.       Impression:      Fluoroscopy provided during ORIF of the left proximal  humerus.     DICTATED:   02/14/2021  EDITED/ls :   02/14/2021                 I have reviewed the medications:  Scheduled Meds:amLODIPine, 10 mg, Oral, Daily  aspirin, 81 mg, Oral, Daily  atorvastatin, 20 mg, Oral, Daily  calcium carb-cholecalciferol, 1 tablet, Oral, Daily  cefTRIAXone, 1 g, Intravenous, Q24H  citalopram,  10 mg, Oral, Daily  gabapentin, 300 mg, Oral, Daily With Breakfast  levETIRAcetam, 1,000 mg, Oral, Nightly  levETIRAcetam, 1,500 mg, Oral, QAM  losartan-HCTZ (HYZAAR) 50-12.5 combo dose, , Oral, Daily  polyethylene glycol, 17 g, Oral, Daily      Continuous Infusions:Ropivacine HCl-NaCl, 6 mL/hr, Last Rate: 6 mL/hr (02/15/21 0730)  sodium chloride, 50 mL/hr, Last Rate: 50 mL/hr (02/14/21 1210)      PRN Meds:.•  acetaminophen **OR** acetaminophen  •  HYDROcodone-acetaminophen  •  HYDROcodone-acetaminophen  •  HYDROmorphone **AND** naloxone  •  ondansetron **OR** ondansetron  •  senna-docusate sodium    Assessment/Plan   Assessment & Plan     Active Hospital Problems    Diagnosis  POA   • Humerus fracture-bilateral [S42.309A]  Yes   • Idiopathic scoliosis of thoracolumbar spine [M41.25]  Yes   • Left hip pain [M25.552]  Yes   • Essential hypertension [I10]  Yes   • Seizure (CMS/HCC) [R56.9]  Yes   • Neck pain [M54.2]  Yes      Resolved Hospital Problems   No resolved problems to display.     Brief Hospital Course to date:  Keely Haider is a 75 y.o. female with past medical history only significant for hypertension, osteoarthritis osteoporosis, scoliosis, chronic left hip pain as well as a seizure disorder who had a fall on the ice and broke both of her humeri.    These problems are new to me today    This patient's problems and plans were partially entered by my partner and updated as appropriate by me 02/15/21.    Bilateral humeral fracture  -Dr. Loza following, s/p ORIF for L proximal humerus Fx   -R humerus Fx to be treated with sling  -Pain control  -PT/OT  -Per ortho note, will remain in the sling until she is seen back in clinic at approx 10 days postop. She can come out of the sling for elbow wrist and hand ROM.  They will start pendulum exercises at  first postop visit.  --Placement  pending    Seizure disorder  -Continue Keppra     Hypertension-improved  -BP elevated yesterday  -Continue Norvasc,  Lisinopril     Hyponatremia-mild  --Na 132  --AM labs    UTI  -Urine Cx +Klebsiella pneumoniae  -Continue Rocephin 1g IV daily x3d    Chronic osteoarthritis and osteoporosis       DVT prophylaxis: Mechanical due to surgery    Disposition: I expect the patient to be discharged TBD placement    CODE STATUS:   Code Status and Medical Interventions:   Ordered at: 02/12/21 2159     Code Status:    CPR     Medical Interventions (Level of Support Prior to Arrest):    Full     Jade Perkins, APRN  02/15/21

## 2021-02-15 NOTE — PROGRESS NOTES
Orthopedic Daily Progress Note      CC: ORIF L prox humerus fx, Non op Right prox humerus fracture    Pain well controlled  General: no fevers, chills  Abdomen: no nausea, vomiting, or diarrhea    No other complaints    Physical Exam:  I have reviewed the vital signs.  Temp:  [96.8 °F (36 °C)-99 °F (37.2 °C)] 98.3 °F (36.8 °C)  Heart Rate:  [] 103  Resp:  [16-20] 16  BP: (104-207)/(46-68) 141/54    Objective  General Appearance:    Alert, cooperative, no distress  Extremities: No clubbing, cyanosis, or edema to lower extremities  Pulses:  2+ in distal surgical extremity  Skin: Dressing Clean/dry/intact      Results Review:    I have reviewed the labs, radiology results and diagnostic studies:    Results from last 7 days   Lab Units 02/12/21 2036   WBC 10*3/mm3 15.84*   HEMOGLOBIN g/dL 12.1   PLATELETS 10*3/mm3 248     Results from last 7 days   Lab Units 02/12/21 2036   SODIUM mmol/L 133*   POTASSIUM mmol/L 4.2   CO2 mmol/L 23.0   CREATININE mg/dL 0.50*   GLUCOSE mg/dL 118*       I have reviewed the medications.    Assessment/Problem List  POD#0 Day of Surgery   S/p ORIF proximal humerus    Plan  1) Slings  2) OT-- limitations as ordered  3) dressing to remain in place until followup  4) po pain meds with ISB block      Discharge Planning: I expect patient to be discharged TBD    Timoteo Loza MD  02/14/21  19:01 EST

## 2021-02-15 NOTE — PLAN OF CARE
Problem: Adult Inpatient Plan of Care  Goal: Plan of Care Review  Outcome: Ongoing, Progressing  Flowsheets (Taken 2/15/2021 1714)  Progress: no change  Plan of Care Reviewed With: patient  Outcome Summary: Patient VSS, A&Ox4, 2L O2. Has slept on a off throughout shift. C/o neck pain, PO pain meds given and arrow pump infusing @ 6 ml/hr. Aquacel dressing CDI, bilateral slings in place, NWB to BUE. Ambulated 125+ft this shift, up w/ 1 assist. No c/o nausea. Patient has sat in chair all day, states the chair is more comfortable. Plan to D/C to rehab this week. Continue to monitor.  Goal: Patient-Specific Goal (Individualized)  Outcome: Ongoing, Progressing  Goal: Absence of Hospital-Acquired Illness or Injury  Outcome: Ongoing, Progressing  Intervention: Identify and Manage Fall Risk  Recent Flowsheet Documentation  Taken 2/15/2021 1624 by Noemy Camarillo, RN  Safety Promotion/Fall Prevention:  • activity supervised  • assistive device/personal items within reach  • clutter free environment maintained  • fall prevention program maintained  • gait belt  • toileting scheduled  • safety round/check completed  • room organization consistent  • nonskid shoes/slippers when out of bed  Taken 2/15/2021 1400 by Noemy Camarillo, RN  Safety Promotion/Fall Prevention:  • activity supervised  • assistive device/personal items within reach  • clutter free environment maintained  • fall prevention program maintained  • gait belt  • toileting scheduled  • safety round/check completed  • room organization consistent  • nonskid shoes/slippers when out of bed  Taken 2/15/2021 1215 by Noemy Camarillo, RN  Safety Promotion/Fall Prevention:  • activity supervised  • assistive device/personal items within reach  • clutter free environment maintained  • fall prevention program maintained  • gait belt  • toileting scheduled  • safety round/check completed  • room organization consistent  • nonskid shoes/slippers when out of bed  Taken  2/15/2021 1043 by Noemy Camarillo RN  Safety Promotion/Fall Prevention:  • activity supervised  • assistive device/personal items within reach  • clutter free environment maintained  • fall prevention program maintained  • gait belt  • toileting scheduled  • safety round/check completed  • room organization consistent  • nonskid shoes/slippers when out of bed  Taken 2/15/2021 0830 by Noemy Camarillo RN  Safety Promotion/Fall Prevention:  • activity supervised  • assistive device/personal items within reach  • clutter free environment maintained  • fall prevention program maintained  • gait belt  • toileting scheduled  • safety round/check completed  • room organization consistent  • nonskid shoes/slippers when out of bed  Intervention: Prevent Skin Injury  Recent Flowsheet Documentation  Taken 2/15/2021 1624 by Noemy Camarillo RN  Body Position: (up in chair) other (see comments)  Taken 2/15/2021 1400 by Noemy Camarillo RN  Body Position: (up in chair) other (see comments)  Taken 2/15/2021 1215 by Noemy Camarillo RN  Body Position: (up in chair) other (see comments)  Taken 2/15/2021 1043 by Noemy Camarillo RN  Body Position: (up in chair) other (see comments)  Taken 2/15/2021 0830 by Noemy Camarillo RN  Body Position: (up in chair)  • other (see comments)  • weight shift assistance provided  Intervention: Prevent and Manage VTE (venous thromboembolism) Risk  Recent Flowsheet Documentation  Taken 2/15/2021 1624 by Noemy Camarillo RN  VTE Prevention/Management:  • bilateral  • sequential compression devices off  • dorsiflexion/plantar flexion performed  Taken 2/15/2021 1400 by Noemy Camarillo RN  VTE Prevention/Management:  • bilateral  • sequential compression devices off  • dorsiflexion/plantar flexion performed  Taken 2/15/2021 1215 by Noemy Camarillo RN  VTE Prevention/Management:  • bilateral  • sequential compression devices off  • dorsiflexion/plantar flexion performed  Taken 2/15/2021 1043 by Marquise  Noemy RICK RN  VTE Prevention/Management:  • bilateral  • sequential compression devices off  • dorsiflexion/plantar flexion performed  Taken 2/15/2021 0830 by Noemy Camarillo RN  VTE Prevention/Management:  • bilateral  • sequential compression devices off  • dorsiflexion/plantar flexion performed  Intervention: Prevent Infection  Recent Flowsheet Documentation  Taken 2/15/2021 1624 by Noemy Camarillo RN  Infection Prevention: environmental surveillance performed  Taken 2/15/2021 1400 by Noemy Camarillo RN  Infection Prevention: environmental surveillance performed  Taken 2/15/2021 1215 by Noemy Camarillo RN  Infection Prevention: environmental surveillance performed  Taken 2/15/2021 1043 by Noemy Camarillo RN  Infection Prevention: environmental surveillance performed  Taken 2/15/2021 0830 by Noemy Camarillo RN  Infection Prevention: environmental surveillance performed  Goal: Optimal Comfort and Wellbeing  Outcome: Ongoing, Progressing  Intervention: Provide Person-Centered Care  Recent Flowsheet Documentation  Taken 2/15/2021 1624 by Noemy Camarillo RN  Trust Relationship/Rapport: care explained  Taken 2/15/2021 1400 by Noemy Camarillo RN  Trust Relationship/Rapport: care explained  Taken 2/15/2021 1215 by Noemy Camarillo RN  Trust Relationship/Rapport: care explained  Taken 2/15/2021 1043 by Noemy Camarillo RN  Trust Relationship/Rapport: care explained  Taken 2/15/2021 0830 by Noemy Camarillo RN  Trust Relationship/Rapport:  • care explained  • choices provided  • questions encouraged  • questions answered  • reassurance provided  • thoughts/feelings acknowledged  Goal: Readiness for Transition of Care  Outcome: Ongoing, Progressing     Problem: Bleeding (Orthopaedic Fracture)  Goal: Absence of Bleeding  Outcome: Ongoing, Progressing     Problem: Bowel Elimination Impaired (Orthopaedic Fracture)  Goal: Effective Bowel Elimination  Outcome: Ongoing, Progressing     Problem: Delayed Union/Nonunion  (Orthopaedic Fracture)  Goal: Fracture Stability  Outcome: Ongoing, Progressing     Problem: Embolism (Orthopaedic Fracture)  Goal: Absence of Embolism Signs and Symptoms  Outcome: Ongoing, Progressing  Intervention: Prevent and Manage Embolism Risk  Recent Flowsheet Documentation  Taken 2/15/2021 1624 by Noemy Camarillo RN  VTE Prevention/Management:  • bilateral  • sequential compression devices off  • dorsiflexion/plantar flexion performed  Taken 2/15/2021 1400 by Noemy Camarillo RN  VTE Prevention/Management:  • bilateral  • sequential compression devices off  • dorsiflexion/plantar flexion performed  Taken 2/15/2021 1215 by Noemy Camarillo RN  VTE Prevention/Management:  • bilateral  • sequential compression devices off  • dorsiflexion/plantar flexion performed  Taken 2/15/2021 1043 by Noemy Camarillo RN  VTE Prevention/Management:  • bilateral  • sequential compression devices off  • dorsiflexion/plantar flexion performed  Taken 2/15/2021 0830 by Noemy Camarillo RN  VTE Prevention/Management:  • bilateral  • sequential compression devices off  • dorsiflexion/plantar flexion performed     Problem: Functional Ability Impaired (Orthopaedic Fracture)  Goal: Optimal Functional Ability  Outcome: Ongoing, Progressing  Intervention: Optimize Functional Ability  Recent Flowsheet Documentation  Taken 2/15/2021 1624 by Noemy Camarillo RN  Positioning/Transfer Devices:  • pillows  • in use  Taken 2/15/2021 1400 by Noemy Camarillo RN  Activity Management: ambulated to bathroom  Positioning/Transfer Devices:  • pillows  • in use  Taken 2/15/2021 1215 by Noemy Camarillo RN  Positioning/Transfer Devices:  • pillows  • in use  Taken 2/15/2021 1043 by Noemy Camarillo RN  Positioning/Transfer Devices:  • pillows  • in use  Taken 2/15/2021 0830 by Noemy Camarillo RN  Positioning/Transfer Devices:  • pillows  • in use     Problem: Infection (Orthopaedic Fracture)  Goal: Absence of Infection Signs and Symptoms  Outcome:  Ongoing, Progressing  Intervention: Prevent or Manage Infection  Recent Flowsheet Documentation  Taken 2/15/2021 1624 by Noemy Camarillo RN  Infection Prevention: environmental surveillance performed  Taken 2/15/2021 1400 by Noemy Camarillo RN  Infection Prevention: environmental surveillance performed  Taken 2/15/2021 1215 by Noemy Camarillo RN  Infection Prevention: environmental surveillance performed  Taken 2/15/2021 1043 by Noemy Camarillo RN  Infection Prevention: environmental surveillance performed  Taken 2/15/2021 0830 by Noemy Camarillo RN  Infection Prevention: environmental surveillance performed     Problem: Neurovascular Compromise (Orthopaedic Fracture)  Goal: Effective Tissue Perfusion  Outcome: Ongoing, Progressing     Problem: Pain (Orthopaedic Fracture)  Goal: Acceptable Pain Control  Outcome: Ongoing, Progressing  Intervention: Manage Acute Orthopaedic-Related Pain  Recent Flowsheet Documentation  Taken 2/15/2021 1624 by Noemy Camarillo RN  Diversional Activities: television  Taken 2/15/2021 1400 by Noemy Camarillo RN  Diversional Activities: television  Taken 2/15/2021 1215 by Noemy Camarillo RN  Diversional Activities: television  Taken 2/15/2021 1043 by Noemy Camarillo RN  Diversional Activities: television  Taken 2/15/2021 0830 by Noemy Camarillo RN  Diversional Activities: television     Problem: Respiratory Compromise (Orthopaedic Fracture)  Goal: Effective Oxygenation and Ventilation  Outcome: Ongoing, Progressing  Intervention: Promote Airway Secretion Clearance  Recent Flowsheet Documentation  Taken 2/15/2021 0830 by Noemy Camarillo RN  Cough And Deep Breathing: done independently per patient     Problem: Skin Injury (Orthopaedic Fracture)  Goal: Skin Health and Integrity  Outcome: Ongoing, Progressing  Intervention: Promote Skin Integrity  Recent Flowsheet Documentation  Taken 2/15/2021 1624 by Noemy Camarillo RN  Pressure Reduction Techniques: frequent weight shift  encouraged  Head of Bed (HOB): HOB at 30-45 degrees  Pressure Reduction Devices: chair cushion utilized  Taken 2/15/2021 1400 by Noemy Camarillo RN  Pressure Reduction Techniques: frequent weight shift encouraged  Head of Bed (HOB): HOB at 30-45 degrees  Pressure Reduction Devices: chair cushion utilized  Taken 2/15/2021 1215 by Noemy Camarillo RN  Pressure Reduction Techniques: frequent weight shift encouraged  Head of Bed (HOB): HOB at 30-45 degrees  Pressure Reduction Devices: chair cushion utilized  Taken 2/15/2021 1043 by Noemy Camarillo RN  Pressure Reduction Techniques: frequent weight shift encouraged  Head of Bed (HOB): HOB at 60-90 degrees  Pressure Reduction Devices: chair cushion utilized  Taken 2/15/2021 0830 by Noemy Camarillo RN  Pressure Reduction Techniques: weight shift assistance provided  Head of Bed (HOB): HOB at 30-45 degrees  Pressure Reduction Devices: chair cushion utilized     Problem: Urinary Retention (Orthopaedic Fracture)  Goal: Effective Urinary Elimination  Outcome: Ongoing, Progressing  Intervention: Promote Effective Urinary Elimination  Recent Flowsheet Documentation  Taken 2/15/2021 1624 by Noemy Camarillo RN  Urinary Elimination Promotion:  • toileting offered  • toileting scheduled  Taken 2/15/2021 1400 by Noemy Camarillo RN  Urinary Elimination Promotion:  • toileting offered  • toileting scheduled  Taken 2/15/2021 0830 by Noemy Camarillo RN  Urinary Elimination Promotion:  • toileting offered  • toileting scheduled     Problem: Fall Injury Risk  Goal: Absence of Fall and Fall-Related Injury  Outcome: Ongoing, Progressing  Intervention: Identify and Manage Contributors to Fall Injury Risk  Recent Flowsheet Documentation  Taken 2/15/2021 0830 by Noemy Camarillo RN  Medication Review/Management: medications reviewed  Intervention: Promote Injury-Free Environment  Recent Flowsheet Documentation  Taken 2/15/2021 1624 by Noemy Camarillo RN  Safety Promotion/Fall  Prevention:  • activity supervised  • assistive device/personal items within reach  • clutter free environment maintained  • fall prevention program maintained  • gait belt  • toileting scheduled  • safety round/check completed  • room organization consistent  • nonskid shoes/slippers when out of bed  Taken 2/15/2021 1400 by Noemy Camarillo, RN  Safety Promotion/Fall Prevention:  • activity supervised  • assistive device/personal items within reach  • clutter free environment maintained  • fall prevention program maintained  • gait belt  • toileting scheduled  • safety round/check completed  • room organization consistent  • nonskid shoes/slippers when out of bed  Taken 2/15/2021 1215 by Noemy Camarillo, RN  Safety Promotion/Fall Prevention:  • activity supervised  • assistive device/personal items within reach  • clutter free environment maintained  • fall prevention program maintained  • gait belt  • toileting scheduled  • safety round/check completed  • room organization consistent  • nonskid shoes/slippers when out of bed  Taken 2/15/2021 1043 by Noemy Camarillo, RN  Safety Promotion/Fall Prevention:  • activity supervised  • assistive device/personal items within reach  • clutter free environment maintained  • fall prevention program maintained  • gait belt  • toileting scheduled  • safety round/check completed  • room organization consistent  • nonskid shoes/slippers when out of bed  Taken 2/15/2021 0830 by Noemy Camarillo, RN  Safety Promotion/Fall Prevention:  • activity supervised  • assistive device/personal items within reach  • clutter free environment maintained  • fall prevention program maintained  • gait belt  • toileting scheduled  • safety round/check completed  • room organization consistent  • nonskid shoes/slippers when out of bed   Goal Outcome Evaluation:  Plan of Care Reviewed With: patient  Progress: no change  Outcome Summary: Patient VSS, A&Ox4, 2L O2. Has slept on a off throughout shift.  C/o neck pain, PO pain meds given and arrow pump infusing @ 6 ml/hr. Aquacel dressing CDI, bilateral slings in place, NWB to BUE. Ambulated 125+ft this shift, up w/ 1 assist. No c/o nausea. Plan to D/C to rehab this week. Continue to monitor.

## 2021-02-15 NOTE — PLAN OF CARE
"Goal Outcome Evaluation:  Plan of Care Reviewed With: patient   Pt up in chair through night stating chair would be more comfortable than bed. Pt has been up with nurse and ambulated in room throughout night. No pain reported until pt was awakened at 530 with throbbing ache 10/10 in right shoulder. Arrow adjusted from 4ML/HR to 10ML/HR. Pt given Norco 5mg with snack of applesauce for pts concern that narcotics may \"make her sick.\" Pt has been pleasant and looking forward to healing and heading home.     "

## 2021-02-15 NOTE — PROGRESS NOTES
Discharge Planning Assessment  [facility]     Patient Name: Keely Haider  MRN: [id number]  Today's Date: 2/15/2021    Admit Date: 2/12/2021    Discharge Needs Assessment     Row Name 02/15/21 1208       Living Environment    Lives With  alone    Current Living Arrangements  home/apartment/condo    Primary Care Provided by  self    Provides Primary Care For  no one    Family Caregiver if Needed  child(rosalva), adult    Quality of Family Relationships  helpful;involved;supportive    Able to Return to Prior Arrangements  no       Resource/Environmental Concerns    Resource/Environmental Concerns  none       Transition Planning    Patient/Family Anticipates Transition to  inpatient rehabilitation facility    Patient/Family Anticipated Services at Transition  skilled nursing;rehabilitation services    Transportation Anticipated  family or friend will provide       Discharge Needs Assessment    Readmission Within the Last 30 Days  no previous admission in last 30 days    Equipment Currently Used at Home  cane, straight    Concerns to be Addressed  basic needs;discharge planning    Anticipated Changes Related to Illness  inability to care for self    Equipment Needed After Discharge  -- to be determined    Outpatient/Agency/Support Group Needs  skilled nursing facility    Discharge Facility/Level of Care Needs  nursing facility, skilled    Provided Post Acute Provider List?  Yes    Post Acute Provider List  Nursing Home    Delivered To  Support Person    Method of Delivery  Telephone    Current Discharge Risk  dependent with mobility/activities of daily living;physical impairment        Discharge Plan     Row Name 02/15/21 1210       Plan    Plan  SNF    Plan Comments  I met with Mrs Haider and spoke with daughter in law by phone ( Solange Lobato [phone]). Mrs haider lives in her own home in [place], she has recently relocated from [facility]. She has a son and DIL in [place].She has recently placed her   in the memory care unit at [facility]. we discussed need for short term skilled rehab, then family may make arrangements to complete her recovry at [facility] personal care unit with her . we discussed options for SNF, they are agreeable to start with referrals to the [facility]. I spoke with Solange who accepted referral. Await call back    Final Discharge Disposition Code  03 - skilled nursing facility (SNF)        Continued Care and Services - Admitted Since 2/12/2021    Coordination has not been started for this encounter.         Demographic Summary     Row Name 02/15/21 1206       General Information    Admission Type  inpatient    Arrived From  home    Referral Source  physician    Reason for Consult  discharge planning    Preferred Language  English    General Information Comments  No local PCP yet       Contact Information    Permission Granted to Share Info With  ;family/designee    Contact Information Comments  Meeta Lobato ( daughter in law) [phone]        Functional Status     Row Name 02/15/21 1207       Functional Status    Usual Activity Tolerance  good    Current Activity Tolerance  -- see therapy notes       Functional Status, IADL    Medications  independent    Meal Preparation  independent    Housekeeping  independent    Laundry  independent    Shopping  independent       Mental Status    General Appearance WDL  WDL       Mental Status Summary    Recent Changes in Mental Status/Cognitive Functioning  no changes       Employment/    Employment Status  retired    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       Abuse/Neglect    No documentation.       Legal    No documentation.       Substance Abuse    No documentation.       Patient Forms    No documentation.           Sonja C Kellerman, RN

## 2021-02-15 NOTE — THERAPY TREATMENT NOTE
Patient Name: Keely Haider  : 1945    MRN: [id number]                              Today's Date: 2/15/2021       Admit Date: 2021    Visit Dx:     ICD-10-CM ICD-9-CM   1. Closed fracture of both humeri, initial encounter  S42.301A 819.0    S42.302A    2. Closed fracture of nasal bone, initial encounter  S02.2XXA 802.0   3. Fall due to slipping on ice or snow, initial encounter  W00.9XXA E885.9     Patient Active Problem List   Diagnosis   • Humerus fracture-bilateral   • Idiopathic scoliosis of thoracolumbar spine   • Left hip pain   • Essential hypertension   • Seizure (CMS/HCC)   • Neck pain     Past Medical History:   Diagnosis Date   • Hypertension    • Osteoarthritis of left hip    • Osteoporosis    • Scoliosis    • Seizures (CMS/HCC)      Past Surgical History:   Procedure Laterality Date   • ABDOMINAL HYSTERECTOMY       General Information     Row Name 02/15/21 0956          OT Time and Intention    Document Type  therapy note (daily note)  -JY     Mode of Treatment  occupational therapy;individual therapy  -JY     Row Name 02/15/21 0956          General Information    Patient Profile Reviewed  yes  -JY     Existing Precautions/Restrictions  fall;non-weight bearing;left;right;shoulder;other (see comments) BUE NWB, BUE in donjoy ultra II sling, interscalene nerve catheter LUE  -JY     Row Name 02/15/21 0956          Cognition    Orientation Status (Cognition)  oriented x 4  -JY     Row Name 02/15/21 0956          Safety Issues, Functional Mobility    Safety Issues Affecting Function (Mobility)  insight into deficits/self-awareness;judgment;safety precaution awareness;safety precautions follow-through/compliance;sequencing abilities  -JY     Impairments Affecting Function (Mobility)  balance;endurance/activity tolerance;pain;strength;range of motion (ROM);sensation/sensory awareness;postural/trunk control  -JY     Comment, Safety Issues/Impairments (Mobility)  pt alert, follows commands;  impulsive on one occasion to seek new position for B feet plmt on floor prior to readiness with IV and interscalene nerve cath; limited by L lateral neck pain and subsequent intermittent LH feeling  -JY       User Key  (r) = Recorded By, (t) = Taken By, (c) = Cosigned By    Initials Name Provider Type    Citlalli Elmore OT Occupational Therapist          Mobility/ADL's     Row Name 02/15/21 0958          Bed Mobility    Bed Mobility  other (see comments) pt received UIC as pt slept in recliner overnight for improved position and comfort; reviewed NWB BUEs impacting ascend, uprighting trunk in either recliner or bed  -JY     Scooting/Bridging [place] (Bed Mobility)  not tested  -JY     Supine-Sit [place] (Bed Mobility)  not tested  -JY     Comment (Bed Mobility)  pt received UIC as pt slept in recliner overnight for improved position and comfort; reviewed NWB BUEs impacting ascend, uprighting trunk in either recliner or bed  -Y     Row Name 02/15/21 0958          Transfers    Transfers  sit-stand transfer  -J     Comment (Transfers)  skilled cues for seq to move to edge of recliner and use core, leg strength to assist in standing given BUE NWB; cues for safety; pt limited by L lateral neck pain and subsequent intermittent LH feeling, VSS  -JY     Sit-Stand [place] (Transfers)  contact guard;verbal cues  -     Row Name 02/15/21 0958          Sit-Stand Transfer    Assistive Device (Sit-Stand Transfers)  other (see comments) gait belt  -     Row Name 02/15/21 0958          Functional Mobility    Functional Mobility- Ind. Level  not tested  -     Functional Mobility- Comment  defer to PT for specifics for fxl mobility; pt deferred to complete at this time d/t increased pain at L neck and subsequent intermittent LH feeling  -     Row Name 02/15/21 0958          Activities of Daily Living    BADL Assessment/Intervention  bathing;upper body dressing;feeding  -     Row Name 02/15/21 0958           Mobility    Extremity Weight-bearing Status  left upper extremity;right upper extremity  -JY     Left Upper Extremity (Weight-bearing Status)  (S) non weight-bearing (NWB)  -JY     Right Upper Extremity (Weight-bearing Status)  (S) non weight-bearing (NWB)  -JY     Row Name 02/15/21 0958          Bathing Assessment/Intervention    [place] Level (Bathing)  bathing skills;upper body;other (see comments);maximum assist (25% patient effort);verbal cues B axilla care  -JY     Assistive Devices (Bathing)  other (see comments) harish tech with further restrictions given B humeral fx  -JY     Position (Bathing)  unsupported sitting  -JY     Comment (Bathing)  no CG/family present for teaching; re educated pt on optimal position (seated pendulum) to complete task and general harish tech however with limited use of opposite UE (w/ NWB) to complete flossing wash; pt will grossly require max A given BUE impact d/t limited reach and limited use of opposite UE; reviewed with pt no showering while L UE nerve catheter still in place  -JY     Row Name 02/15/21 0958          Upper Body Dressing Assessment/Training    [place] Level (Upper Body Dressing)  don;doff;pajama/robe;other (see comments);maximum assist (25% patient effort);dependent (less than 25% patient effort);verbal cues sling  -JY     Assistive Devices (Upper Body Dressing)  other (see comments) harish tech with additional restrictions given B hemeral fx  -JY     Position (Upper Body Dressing)  supported sitting;unsupported sitting  -JY     Comment (Upper Body Dressing)  no CG/family present for teaching; reiterated to pt the importance of being able to direct and lead care being provided to her given the reality of increased A req'd for B humeral fx, pt able to assist only very minimally in unadhering strap on L sling with R hand with limited elbow flex/ext; dep for gross sling mgmt donning and doffing d/t immobilization and BUE precautions; discussed with pt the  optimal sequencing order for threading/unthreading and mgmt around nerve cath at LUE; only very intermittent max A with RUE helping to manage L sided garments with grasp and elbow flex/ext  -[facility] Name 02/15/21 0958          Lower Body Dressing Assessment/Training    [place] Level (Lower Body Dressing)  not tested  -JY     Row Name 02/15/21 0958          Self-Feeding Assessment/Training    [place] Level (Feeding)  maximum assist (25% patient effort);dependent (less than 25% patient effort);verbal cues  -J     Position (Self-Feeding)  unsupported sitting  -JY     Comment (Feeding)  OT educated pt on safe tech, strategies and potential use of AE for feeding including plate guard, adapted cup with straw (for length) and dycem to prevent slipping, dropping etc; reiterated most safe tech is to demo flex/ext at R elbow to t/f foods with shoulder prec maintained and further use of finger foods; pt dep for receiving pills and holding drink for water afterward  -       User Key  (r) = Recorded By, (t) = Taken By, (c) = Cosigned By    Initials Name Provider Type    Citlalli Elmore OT Occupational Therapist        Obj/Interventions     Row Name 02/15/21 1014          Sensory Assessment (Somatosensory)    Sensory Assessment (Somatosensory)  left UE;right-sided sensation intact  -     Left UE Sensory Assessment  general sensation;light touch awareness;light touch localization;intact;other (see comments) diminished sensation throughout proximal > distal with tingling reported in L hand  -     Sensory Subjective Reports  tingling  -JY     Row Name 02/15/21 1014          Sensory Interventions    Comment, Sensory Intervention  decreased sensation at LUE with proximal > distal and tingling reported at L hand, digits; pt able to complete most TE and demo opposition  -JY     Row Name 02/15/21 1014          Elbow/Forearm (Therapeutic Exercise)    Elbow/Forearm (Therapeutic Exercise)  AAROM (active assistive  range of motion);AROM (active range of motion)  -     Elbow/Forearm AROM (Therapeutic Exercise)  right;flexion;extension;supination;pronation;left;other (see comments);sitting;10 repetitions L AROM limited to pronation/supination, pt req'd AAROM for flex/ext  -     Elbow/Forearm AAROM (Therapeutic Exercise)  left;flexion;extension;sitting;10 repetitions  -     Row Name 02/15/21 1014          Wrist (Therapeutic Exercise)    Wrist (Therapeutic Exercise)  AROM (active range of motion)  -     Wrist AROM (Therapeutic Exercise)  bilateral;flexion;extension;10 repetitions  -     Row Name 02/15/21 1014          Hand (Therapeutic Exercise)    Hand (Therapeutic Exercise)  AROM (active range of motion)  -     Hand AROM/AAROM (Therapeutic Exercise)  bilateral;AROM (active range of motion);finger flexion;finger extension;10 repetitions  -     Row Name 02/15/21 1014          Balance    Balance Assessment  sitting static balance;sitting dynamic balance;standing static balance;standing dynamic balance  -     Static Sitting Balance  WFL;supported;unsupported;sitting in chair  -     Dynamic Sitting Balance  WFL;supported;unsupported;sitting in chair;other (see comments) ADLs, HEP  -     Static Standing Balance  WFL;supported;standing  -     Dynamic Standing Balance  WFL;supported;standing;other (see comments) fxl transfer with cloth mgmt  -     Balance Interventions  sitting;standing;static;dynamic;sit to stand;supported;occupation based/functional task  -     Comment, Balance  no LOB noted with standing tasks, pt grossly limited by L lateral neck pain and subsequent LH feeling, limited standing assessed  -     Row Name 02/15/21 1014          Therapeutic Exercise    Therapeutic Exercise  elbow/forearm;wrist;hand reviewed with pt the HEP as indicated by MD with focus on BUE elbow/wrist/hand ROM, frequency, optimal timing and position  -       User Key  (r) = Recorded By, (t) = Taken By, (c) = Cosigned By  "   Initials Name Provider Type    Citlalli Elmore, ADAMS Occupational Therapist        Goals/Plan    No documentation.       Clinical Impression     Row Name 02/15/21 1020          Pain Assessment    Additional Documentation  Pain Scale: Numbers Pre/Post-Treatment (Group)  -JY     Row Name 02/15/21 1020          Pain Scale: Numbers Pre/Post-Treatment    Pretreatment Pain Rating  7/10  -JY     Posttreatment Pain Rating  7/10  -JY     Pain Location - Side  Left  -JY     Pain Location - Orientation  lateral  -JY     Pain Location  neck  -JY     Pre/Posttreatment Pain Comment  pt reported persistent L lateral neck pain, increased some with HEP completion and forward sitting for ADLs; nurse aware and administered pain medication  -JY     Pain Intervention(s)  Cold applied;Repositioned;Ambulation/increased activity;Rest  -JY     Row Name 02/15/21 1020          Plan of Care Review    Plan of Care Reviewed With  patient  -GAYY     Progress  improving  -JY     Outcome Summary  Pt alert, O x 4 and agreeable to OT tx. Pt received UIC for improved position and comfort thus bed mobility not assessed this date. Pt completed UBD including gown exchange and B slings with max A to grossly dep with pt A limited to R elbow flex/ext to assist in managing strap, garment at L side. Pt max A for UBB (B axilla care). Pt grossly max A for self feeding. OT collectively reviewed optimal positioning, tech, strategy in order to maintain B UE precautions and maximize I. Pt is limited in her I given the nature of B humeral fx. Reiterated the need to be able to guide, lead, direct care provided based on training provided. Reviewed no showering while nerve cath at E still in place and made recommendations for self feeding to increase I and reduce dropping, slipping, coordination deficits. Reviewed wear schedule for slings and optimal position, adjustment for \"jut right fit\". Pt completed HEP encompassing BUEs i.e. elbow/wrist/hand ROM with gross " AROM at RUE and most of LUE except L elbow flexion/ext in which pt req'd AAROM dt decreased motor control. Pt completed 1 set x 10 reps each. Pt would still benefit from IRF prior to return home given remaining underlying impairments and impact on fxn  -JY     Row Name 02/15/21 1020          Therapy Plan Review/Discharge Plan (OT)    Equipment Needs Upon Discharge (OT)  other (see comments) potentially feeding equipment  -JY     Anticipated Discharge Disposition (OT)  inpatient rehabilitation facility  -JY     Row Name 02/15/21 1020          Vital Signs    Pre Systolic BP Rehab  151  -JY     Pre Treatment Diastolic BP  51  -JY     Post Systolic BP Rehab  121  -JY     Post Treatment Diastolic BP  56  -JY     Pretreatment Heart Rate (beats/min)  72  -JY     Posttreatment Heart Rate (beats/min)  79  -JY     Pre SpO2 (%)  95  -JY     O2 Delivery Pre Treatment  room air  -JY     Intra SpO2 (%)  92  -JY     O2 Delivery Intra Treatment  room air  -JY     Post SpO2 (%)  94  -JY     O2 Delivery Post Treatment  room air  -JY     Pre Patient Position  Sitting  -JY     Intra Patient Position  Standing  -JY     Post Patient Position  Sitting  -JY     Row Name 02/15/21 1020          Positioning and Restraints    Pre-Treatment Position  sitting in chair/recliner  -JY     Post Treatment Position  chair  -JY     In Chair  notified nsg;reclined;call light within reach;encouraged to call for assist;exit alarm on;with brace;waffle cushion;legs elevated BUE slings donned, L UE nerve cath intact, ice pack to L shoulder and neck supported for pain  -JY       User Key  (r) = Recorded By, (t) = Taken By, (c) = Cosigned By    Initials Name Provider Type    Citlalli Elmore OT Occupational Therapist        Outcome Measures     Row Name 02/15/21 1029          How much help from another is currently needed...    Putting on and taking off regular lower body clothing?  1  -JY     Bathing (including washing, rinsing, and drying)  2  -JY      Toileting (which includes using toilet bed pan or urinal)  1  -JY     Putting on and taking off regular upper body clothing  2  -JY     Taking care of personal grooming (such as brushing teeth)  1  -JY     Eating meals  2  -JY     AM-PAC 6 Clicks Score (OT)  9  -JY     Row Name 02/15/21 1029          Functional Assessment    Outcome Measure Options  AM-PAC 6 Clicks Daily Activity (OT)  -JY       User Key  (r) = Recorded By, (t) = Taken By, (c) = Cosigned By    Initials Name Provider Type    Citlalli Elmore OT Occupational Therapist        Occupational Therapy Education                 Title: PT OT SLP Therapies (In Progress)     Topic: Occupational Therapy (In Progress)     Point: ADL training (In Progress)     Description:   Instruct learner(s) on proper safety adaptation and remediation techniques during self care or transfers.   Instruct in proper use of assistive devices.              Learning Progress Summary           Patient Acceptance, E,D, NR by SOLOMON at 2/15/2021 0815    Acceptance, TB,E,D,H, VU,DU,NR by  at 2/14/2021 1551                   Point: Home exercise program (In Progress)     Description:   Instruct learner(s) on appropriate technique for monitoring, assisting and/or progressing therapeutic exercises/activities.              Learning Progress Summary           Patient Acceptance, E,D, NR by SOLOMON at 2/15/2021 0815    Acceptance, TB,E,D,H, VU,DU,NR by  at 2/14/2021 1551                   Point: Precautions (In Progress)     Description:   Instruct learner(s) on prescribed precautions during self-care and functional transfers.              Learning Progress Summary           Patient Acceptance, E,D, NR by SOLOMON at 2/15/2021 0815    Acceptance, TB,E,D,H, VU,DU,NR by  at 2/14/2021 1551                   Point: Body mechanics (In Progress)     Description:   Instruct learner(s) on proper positioning and spine alignment during self-care, functional mobility activities and/or exercises.               "Learning Progress Summary           Patient Acceptance, E,D, NR by SOLOMON at 2/15/2021 0815    Acceptance, TB,E,D,H, VU,DU,NR by  at 2/14/2021 1551                               User Key     Initials Effective Dates Name Provider Type Discipline     03/07/18 -  Fina Cooper, OT Occupational Therapist OT    SOLOMON 01/29/20 -  Citlalli Elena OT Occupational Therapist OT              OT Recommendation and Plan     Plan of Care Review  Plan of Care Reviewed With: patient  Progress: improving  Outcome Summary: Pt alert, O x 4 and agreeable to OT tx. Pt received UIC for improved position and comfort thus bed mobility not assessed this date. Pt completed UBD including gown exchange and B slings with max A to grossly dep with pt A limited to R elbow flex/ext to assist in managing strap, garment at L side. Pt max A for UBB (B axilla care). Pt grossly max A for self feeding. OT collectively reviewed optimal positioning, tech, strategy in order to maintain B UE precautions and maximize I. Pt is limited in her I given the nature of B humeral fx. Reiterated the need to be able to guide, lead, direct care provided based on training provided. Reviewed no showering while nerve cath at LUE still in place and made recommendations for self feeding to increase I and reduce dropping, slipping, coordination deficits. Reviewed wear schedule for slings and optimal position, adjustment for \"jut right fit\". Pt completed HEP encompassing BUEs i.e. elbow/wrist/hand ROM with gross AROM at RUE and most of LUE except L elbow flexion/ext in which pt req'd AAROM dt decreased motor control. Pt completed 1 set x 10 reps each. Pt would still benefit from IRF prior to return home given remaining underlying impairments and impact on fxn     Time Calculation:   Time Calculation- OT     Row Name 02/15/21 1031             Time Calculation- OT    OT Start Time  0815  -JY      OT Received On  02/15/21  -JY      OT Goal Re-Cert Due Date  02/24/21  -JY         " Timed Charges    14661 - OT Therapeutic Exercise Minutes  25  -JY      29421 - OT Therapeutic Activity Minutes  20  -JY      09289 - OT Self Care/Mgmt Minutes  30  -JY        User Key  (r) = Recorded By, (t) = Taken By, (c) = Cosigned By    Initials Name Provider Type    Citlalli Elmore OT Occupational Therapist        Therapy Charges for Today     Code Description Service Date Service Provider Modifiers Qty    97216648779 HC OT THER PROC EA 15 MIN 2/15/2021 Citlalli Elena OT GO 2    71957903019 HC OT THERAPEUTIC ACT EA 15 MIN 2/15/2021 Citlalli Elena OT GO 1    26166318764 HC OT SELF CARE/MGMT/TRAIN EA 15 MIN 2/15/2021 Citllali Elena OT GO 2               Citlalli Elena OT  2/15/2021

## 2021-02-15 NOTE — PROGRESS NOTES
[facility]    Nerve Cath Post Op Call    Patient Name: Keely Haider  :  1945  MRN:  [id number]  Date of Discharge:     Nerve Cath Post Op Call:    Analgesia:Fair  Pain Score:10/10  Side Effects:None  Catheter Site:clean  Patient Controlled ON Q pump infusion rate: 10ml/hr  Catheter Plan:Will continue with plan at home without changes and The patient was instructed to call ON CALL Anesthesia provider for any questions or problems  Patient/Family instructed to call ON CALL anesthesia provider for any questions or problems.  Patient Follow Up:    t  Patient stated that her pain was a 10 and the RN had increased her pump to 10 ml/hr. Her pain is starting to ease up. RN will call if the pain does not continue to improve.

## 2021-02-16 PROCEDURE — 25010000002 CEFTRIAXONE PER 250 MG: Performed by: FAMILY MEDICINE

## 2021-02-16 PROCEDURE — 97110 THERAPEUTIC EXERCISES: CPT

## 2021-02-16 PROCEDURE — 97530 THERAPEUTIC ACTIVITIES: CPT

## 2021-02-16 PROCEDURE — 97116 GAIT TRAINING THERAPY: CPT

## 2021-02-16 PROCEDURE — 94799 UNLISTED PULMONARY SVC/PX: CPT

## 2021-02-16 PROCEDURE — 97535 SELF CARE MNGMENT TRAINING: CPT

## 2021-02-16 PROCEDURE — 99232 SBSQ HOSP IP/OBS MODERATE 35: CPT | Performed by: NURSE PRACTITIONER

## 2021-02-16 RX ADMIN — SODIUM CHLORIDE 1 G: 900 INJECTION INTRAVENOUS at 15:31

## 2021-02-16 RX ADMIN — LOSARTAN POTASSIUM: 50 TABLET, FILM COATED ORAL at 08:48

## 2021-02-16 RX ADMIN — GABAPENTIN 300 MG: 300 CAPSULE ORAL at 08:49

## 2021-02-16 RX ADMIN — LEVETIRACETAM 1000 MG: 500 TABLET, FILM COATED ORAL at 20:13

## 2021-02-16 RX ADMIN — Medication 1 TABLET: at 08:54

## 2021-02-16 RX ADMIN — HYDROCODONE BITARTRATE AND ACETAMINOPHEN 2 TABLET: 5; 325 TABLET ORAL at 08:49

## 2021-02-16 RX ADMIN — CITALOPRAM HYDROBROMIDE 10 MG: 20 TABLET ORAL at 08:49

## 2021-02-16 RX ADMIN — ATORVASTATIN CALCIUM 20 MG: 20 TABLET, FILM COATED ORAL at 08:49

## 2021-02-16 RX ADMIN — LEVETIRACETAM 1500 MG: 750 TABLET, FILM COATED ORAL at 08:48

## 2021-02-16 RX ADMIN — POLYETHYLENE GLYCOL 3350 17 G: 17 POWDER, FOR SOLUTION ORAL at 08:48

## 2021-02-16 RX ADMIN — HYDROCODONE BITARTRATE AND ACETAMINOPHEN 1 TABLET: 5; 325 TABLET ORAL at 15:31

## 2021-02-16 RX ADMIN — AMLODIPINE BESYLATE 10 MG: 10 TABLET ORAL at 08:49

## 2021-02-16 RX ADMIN — ASPIRIN 81 MG: 81 TABLET, CHEWABLE ORAL at 08:48

## 2021-02-16 NOTE — PROGRESS NOTES
[facility]    Acute pain service Inpatient Progress Note    Patient Name: Keely Haider  :  1945  MRN:  [id number]        Acute Pain  Service Inpatient Progress Note:    Analgesia:Good  Pain Score:5/10  LOC: alert and awake  Resp Status: room air  Cardiac: VS stable  Side Effects:None  Catheter Site:clean, dressing intact and dry  Cath type: peripheral nerve cath with ON Q  Infusion rate: 6ml/hr  Catheter Plan:Catheter to remain Insitu and Continue catheter infusion rate unchanged  Comments: -Paatient happy with pain management  --------------------------------------------------------------------------------  Physical Therapy Manual Muscle Testing Results:   ]    Physical Therapy - Plan of Care Review - Outcome Summary:  Outcome Summary: Patient increased gait distance to 135 feet with CGA for safety, limited by fatigue. O2 sats improved with gait today, only dropping to 92% on room air. Increased stability with ambulation and t/f today. Will continue to progress as able. (21)]    Occupational Therapy - Plan of Care Review - Outcome Summary:  Outcome Summary: Pt alert, O x 4 and agreeable to OT tx. Pt received UIC for improved position and comfort thus bed mobility not assessed this date. Pt completed UBD including gown exchange and B slings with max A to grossly dep with pt A limited to R elbow flex/ext to assist in managing strap, garment at L side. Pt max A for UBB (B axilla care). Pt grossly max A for self feeding. OT collectively reviewed optimal positioning, tech, strategy in order to maintain B UE precautions and maximize I. Pt is limited in her I given the nature of B humeral fx. Reiterated the need to be able to guide, lead, direct care provided based on training provided. Reviewed no showering while nerve cath at LUE still in place and made recommendations for self feeding to increase I and reduce dropping, slipping, coordination deficits. Reviewed wear schedule for slings and  "optimal position, adjustment for \"jut right fit\". Pt completed HEP encompassing BUEs i.e. elbow/wrist/hand ROM with gross AROM at RUE and most of LUE except L elbow flexion/ext in which pt req'd AAROM dt decreased motor control. Pt completed 1 set x 10 reps each. Pt would still benefit from IRF prior to return home given remaining underlying impairments and impact on fxn (02/15/21 1020)]  ----------------------------------------------------------------------------------                                            "

## 2021-02-16 NOTE — PLAN OF CARE
Goal Outcome Evaluation:  Plan of Care Reviewed With: patient      Pt has been up ambulating in room with nurse or sleeping in chair for comfort.  Aquacell in place on left shoulder with arrow at 6ml/hrs. Bilateral slings in place.  Pt treated once with Norco for breakthrough pain. Pt using right hand to feed self, unable to maneuver adaptive cup and does better with cup and straw. VSS with 2L oxygen used during hours of sleep. Pt voices looking forward to healing and returning to home with family.

## 2021-02-16 NOTE — PLAN OF CARE
Problem: Adult Inpatient Plan of Care  Goal: Plan of Care Review  Recent Flowsheet Documentation  Taken 2/16/2021 1318 by Citlalli Elena OT  Progress: improving  Plan of Care Reviewed With: patient  Outcome Summary: Pt alert, O x 4, agreeable to OT tx. Pt still prefers to sleep in recliner d/t comfort and positioning of UEs thus bed mob not assessed. Pt completed seated UBD of garments and sling with gross dependency, intermittent max A w/ ability to unadhere neck strap from loops (u/a to remove from neck and adhere to precautions) and use available elbow flex/ext and hand grasp to minimally pull garment down on opposite side however requires initiation A. Pt completed B axilla care with gross max A limited by reach with restricted ROM Eliu at UEs. Pt educated on AE for feeding to assist with t/f to mouth including adapted cup with straw and scoop plate w/ guard, pt verbalizes/demo competency in cup and presents with increased I. Pt completed toileting requiring dep care for hygiene and [place] [facility] completed STS with CGA and fxl t/f between recliner and BSC with min A with observation of shuffling feet, cued for stepping. No dizziness noted in standing or t/f yet with repeated positional change for UBD. OT collectively reviewed optimal position, tech, strategy and B UE precautions for ADLs and related mobility. Pt completed HEP as indicated by MD with focus on BUE ROM at elbow/wrist/hands with improved AROM throughout this date at BUEs, all reps. Pt reported mild increase in pain, transient with HEP. SPO2 > 90% throughout OT session. Will progress pt as able while hospitalized, continue to recommend further rehab at d/c when medically ready.

## 2021-02-16 NOTE — THERAPY TREATMENT NOTE
Patient Name: Keely Haider  : 1945    MRN: [id number]                              Today's Date: 2021       Admit Date: 2021    Visit Dx:     ICD-10-CM ICD-9-CM   1. Closed fracture of both humeri, initial encounter  S42.301A 819.0    S42.302A    2. Closed fracture of nasal bone, initial encounter  S02.2XXA 802.0   3. Fall due to slipping on ice or snow, initial encounter  W00.9XXA E885.9     Patient Active Problem List   Diagnosis   • Humerus fracture-bilateral   • Idiopathic scoliosis of thoracolumbar spine   • Left hip pain   • Essential hypertension   • Seizure (CMS/HCC)   • Neck pain     Past Medical History:   Diagnosis Date   • Hypertension    • Osteoarthritis of left hip    • Osteoporosis    • Scoliosis    • Seizures (CMS/HCC)      Past Surgical History:   Procedure Laterality Date   • ABDOMINAL HYSTERECTOMY       General Information     Row Name 21          Physical Therapy Time and Intention    Document Type  therapy note (daily note)  -LR     Mode of Treatment  physical therapy;individual therapy  -LR     Row Name 21          General Information    Patient Profile Reviewed  yes  -LR     Existing Precautions/Restrictions  fall;non-weight bearing;left;right;shoulder;other (see comments) NWB B UE, slings to B UE, B humeral fx, R non-op, L interscalene nerve catheter, monitor O2  -LR     Barriers to Rehab  medically complex  -LR     Row Name 21          Cognition    Orientation Status (Cognition)  oriented x 4  -LR     Row Name 21          Safety Issues, Functional Mobility    Safety Issues Affecting Function (Mobility)  safety precaution awareness;insight into deficits/self-awareness  -LR     Impairments Affecting Function (Mobility)  balance;strength;coordination;endurance/activity tolerance;pain;range of motion (ROM);shortness of breath  -LR       User Key  (r) = Recorded By, (t) = Taken By, (c) = Cosigned By    Initials Name Provider Type     LR Jade Maradiaga, PT Physical Therapist        Mobility     Row Name 02/16/21 0914          Bed Mobility    Supine-Sit [place] (Bed Mobility)  not tested  -LR     Comment (Bed Mobility)  [facility] on arrival and at end of treatment session.  -LR     Row Name 02/16/21 0914          Transfers    Comment (Transfers)  Verbal cues to scoot towards front of chair and to lean forward and power up through LEs to stand. Denied dizziness upon standing.  -LR     Row Name 02/16/21 0914          Sit-Stand Transfer    Sit-Stand [place] (Transfers)  verbal cues;contact guard  -LR     Assistive Device (Sit-Stand Transfers)  other (see comments) no AD, support at gait belt  -LR     Row Name 02/16/21 0914          Gait/Stairs (Locomotion)    [place] Level (Gait)  verbal cues;contact guard  -LR     Assistive Device (Gait)  other (see comments) no AD, support at gait belt  -LR     Distance in Feet (Gait)  135  -LR     Deviations/Abnormal Patterns (Gait)  bilateral deviations;shalonda decreased;gait speed decreased;festinating/shuffling;stride length decreased;base of support, narrow  -LR     Bilateral Gait Deviations  heel strike decreased  -LR     Comment (Gait/Stairs)  Patient ambulated with step to gait pattern at slow pace with short step length and shuffling steps. Verbal cues for increased foot clearance and step length. Slight improvement with cues for correction, patient stating she felt safer with shorter steps d/t fear of falling again. One mild episode of unsteadiness upon turning and initiating gait but patient able to self correct. Mild SOA with ambulation, O2 only dropping to 92% with gait, cues for PLB. Gait limited by fatigue.  -LR     Row Name 02/16/21 0914          Mobility    Extremity Weight-bearing Status  left upper extremity;right upper extremity  -LR     Left Upper Extremity (Weight-bearing Status)  (S) non weight-bearing (NWB)  -LR     Right Upper Extremity (Weight-bearing Status)  (S) non  weight-bearing (NWB)  -LR       User Key  (r) = Recorded By, (t) = Taken By, (c) = Cosigned By    Initials Name Provider Type    Jade Hedrick, TAYLOR Physical Therapist        Obj/Interventions     Row Name 02/16/21 0914          Motor Skills    Therapeutic Exercise  -- defer ther ex to OT  -LR     Row Name 02/16/21 0914          Balance    Static Sitting Balance  WFL;unsupported;sitting in chair  -LR     Dynamic Sitting Balance  WFL;unsupported;sitting in chair  -LR     Static Standing Balance  WFL;supported;standing  -LR     Dynamic Standing Balance  mild impairment;supported;standing  -LR       User Key  (r) = Recorded By, (t) = Taken By, (c) = Cosigned By    Initials Name Provider Type    Jade Hedrick, PT Physical Therapist        Goals/Plan     Row Name 02/16/21 0914          Bed Mobility Goal 1 (PT)    Activity/Assistive Device (Bed Mobility Goal 1, PT)  sit to supine/supine to sit  -LR     [place] Level/Cues Needed (Bed Mobility Goal 1, PT)  minimum assist (75% or more patient effort);1 person assist  -LR     Time Frame (Bed Mobility Goal 1, PT)  long term goal (LTG);5 days  -LR     Progress/Outcomes (Bed Mobility Goal 1, PT)  continuing progress toward goal;goal ongoing  -     Row Name 02/16/21 0914          Transfer Goal 1 (PT)    Activity/Assistive Device (Transfer Goal 1, PT)  sit-to-stand/stand-to-sit  -LR     [place] Level/Cues Needed (Transfer Goal 1, PT)  independent  -LR     Time Frame (Transfer Goal 1, PT)  long term goal (LTG);5 days  -LR     Progress/Outcome (Transfer Goal 1, PT)  continuing progress toward goal;goal ongoing  -LR     Row Name 02/16/21 0914          Gait Training Goal 1 (PT)    Activity/Assistive Device (Gait Training Goal 1, PT)  gait (walking locomotion)  -LR     [place] Level (Gait Training Goal 1, PT)  independent  -LR     Distance (Gait Training Goal 1, PT)  400 feet  -LR     Time Frame (Gait Training Goal 1, PT)  long term goal (LTG);5  days  -LR     Progress/Outcome (Gait Training Goal 1, PT)  continuing progress toward goal;goal ongoing  -LR       User Key  (r) = Recorded By, (t) = Taken By, (c) = Cosigned By    Initials Name Provider Type    LR Jade Maradiaga, PT Physical Therapist        Clinical Impression     Row Name 02/16/21 0914          Pain    Additional Documentation  Pain Scale: Numbers Pre/Post-Treatment (Group)  -LR     Row Name 02/16/21 0914          Pain Scale: Numbers Pre/Post-Treatment    Pretreatment Pain Rating  5/10  -LR     Posttreatment Pain Rating  6/10  -LR     Pain Location - Side  Bilateral  -LR     Pain Location  shoulder  -LR     Pain Intervention(s)  Ambulation/increased activity;Repositioned  -LR     Row Name 02/16/21 0914          Plan of Care Review    Plan of Care Reviewed With  patient  -LR     Progress  improving  -LR     Outcome Summary  Patient increased gait distance to 135 feet with CGA for safety, limited by fatigue. O2 sats improved with gait today, only dropping to 92% on room air. Increased stability with ambulation and t/f today. Will continue to progress as able.  -LR     Row Name 02/16/21 0914          Therapy Assessment/Plan (PT)    Rehab Potential (PT)  good, to achieve stated therapy goals  -LR     Criteria for Skilled Interventions Met (PT)  yes;meets criteria;skilled treatment is necessary  -LR     Row Name 02/16/21 0914          Vital Signs    Pre SpO2 (%)  96  -LR     O2 Delivery Pre Treatment  room air  -LR     Intra SpO2 (%)  92  -LR     O2 Delivery Intra Treatment  room air  -LR     Post SpO2 (%)  96  -LR     O2 Delivery Post Treatment  room air  -LR     Pre Patient Position  Sitting  -LR     Intra Patient Position  Standing  -LR     Post Patient Position  Sitting  -LR     Row Name 02/16/21 0914          Positioning and Restraints    Pre-Treatment Position  sitting in chair/recliner  -LR     Post Treatment Position  chair  -LR     In Chair  notified nsg;reclined;sitting;call light  within reach;encouraged to call for assist;exit alarm on;legs elevated;waffle cushion  -LR       User Key  (r) = Recorded By, (t) = Taken By, (c) = Cosigned By    Initials Name Provider Type    Jade Hedrick, PT Physical Therapist        Outcome Measures     Row Name 02/16/21 0914          How much help from another person do you currently need...    Turning from your back to your side while in flat bed without using bedrails?  2  -LR     Moving from lying on back to sitting on the side of a flat bed without bedrails?  2  -LR     Moving to and from a bed to a chair (including a wheelchair)?  3  -LR     Standing up from a chair using your arms (e.g., wheelchair, bedside chair)?  3  -LR     Climbing 3-5 steps with a railing?  2  -LR     To walk in hospital room?  3  -LR     AM-PAC 6 Clicks Score (PT)  15  -LR     Row Name 02/16/21 0914          Functional Assessment    Outcome Measure Options  AM-PAC 6 Clicks Basic Mobility (PT)  -LR       User Key  (r) = Recorded By, (t) = Taken By, (c) = Cosigned By    Initials Name Provider Type    Jade Hedrick, PT Physical Therapist        Physical Therapy Education                 Title: PT OT SLP Therapies (In Progress)     Topic: Physical Therapy (Done)     Point: Mobility training (Done)     Learning Progress Summary           Patient Acceptance, E,D, VU,NR by LR at 2/16/2021 0914    Comment: Educated on precautions, NWB status, correct sit<->stand t/f technique, correct gait mechanics, safety with mobility, PLB, and progression of POC.    Acceptance, E,TB, VU,NR by AY at 2/15/2021 1105    Acceptance, E,TB, VU,NR by AY at 2/15/2021 1105    Comment: pt educated on weight bearing status, proper technique for transfers, gait mechanics, purlsed lip breathing technique, HEP, and POC progression.                   Point: Home exercise program (Done)     Learning Progress Summary           Patient Acceptance, E,D, VU,NR by LR at 2/16/2021 0914    Comment:  Educated on precautions, NWB status, correct sit<->stand t/f technique, correct gait mechanics, safety with mobility, PLB, and progression of POC.    Acceptance, E,TB, VU,NR by AY at 2/15/2021 1105    Acceptance, E,TB, VU,NR by AY at 2/15/2021 1105    Comment: pt educated on weight bearing status, proper technique for transfers, gait mechanics, purlsed lip breathing technique, HEP, and POC progression.                   Point: Body mechanics (Done)     Learning Progress Summary           Patient Acceptance, E,D, VU,NR by LR at 2/16/2021 0914    Comment: Educated on precautions, NWB status, correct sit<->stand t/f technique, correct gait mechanics, safety with mobility, PLB, and progression of POC.    Acceptance, E,TB, VU,NR by AY at 2/15/2021 1105    Acceptance, E,TB, VU,NR by AY at 2/15/2021 1105    Comment: pt educated on weight bearing status, proper technique for transfers, gait mechanics, purlsed lip breathing technique, HEP, and POC progression.                   Point: Precautions (Done)     Learning Progress Summary           Patient Acceptance, E,D, VU,NR by LR at 2/16/2021 0914    Comment: Educated on precautions, NWB status, correct sit<->stand t/f technique, correct gait mechanics, safety with mobility, PLB, and progression of POC.    Acceptance, E,TB, VU,NR by AY at 2/15/2021 1105    Acceptance, E,TB, VU,NR by AY at 2/15/2021 1105    Comment: pt educated on weight bearing status, proper technique for transfers, gait mechanics, purlsed lip breathing technique, HEP, and POC progression.                               User Key     Initials Effective Dates Name Provider Type Discipline    LR 06/19/15 -  Jade Maradiaga, PT Physical Therapist PT    AY 11/10/20 -  Rosalinda Elena, PT Physical Therapist PT              PT Recommendation and Plan     Plan of Care Reviewed With: patient  Progress: improving  Outcome Summary: Patient increased gait distance to 135 feet with CGA for safety, limited by fatigue.  O2 sats improved with gait today, only dropping to 92% on room air. Increased stability with ambulation and t/f today. Will continue to progress as able.     Time Calculation:   PT Charges     Row Name 02/16/21 0914             Time Calculation    Start Time  0914  -LR      PT Received On  02/16/21  -LR      PT Goal Re-Cert Due Date  02/25/21  -LR         Time Calculation- PT    Total Timed Code Minutes- PT  18 minute(s)  -LR         Timed Charges    33617 - Gait Training Minutes   18  -LR        User Key  (r) = Recorded By, (t) = Taken By, (c) = Cosigned By    Initials Name Provider Type    LR Jade Maradiaga, PT Physical Therapist        Therapy Charges for Today     Code Description Service Date Service Provider Modifiers Qty    [id number] HC GAIT TRAINING EA 15 MIN 2/16/2021 Jade Maradiaga, PT GP 1          PT G-Codes  Outcome Measure Options: AM-PAC 6 Clicks Basic Mobility (PT)  AM-PAC 6 Clicks Score (PT): 15  AM-PAC 6 Clicks Score (OT): 9    Jade Maradiaga, PT  2/16/2021

## 2021-02-16 NOTE — PROGRESS NOTES
Continued Stay Note  [facility]     Patient Name: Keely Haider  MRN: [id number]  Today's Date: 2/16/2021    Admit Date: 2/12/2021    Discharge Plan     Row Name 02/16/21 1640       Plan    Plan  SNF    Patient/Family in Agreement with Plan  yes    Plan Comments  Case mgt f/u. Per [facility] at [facility] ([place] location), they have a skilled bed available PENDING insurance authorization, possible transfer 2/17 depending on tranportation,weather, and insurance authorization.        Discharge Codes    No documentation.       Expected Discharge Date and Time     Expected Discharge Date Expected Discharge Time    Feb 17, 2021             Sonja C Kellerman, RN

## 2021-02-16 NOTE — PLAN OF CARE
Problem: Adult Inpatient Plan of Care  Goal: Plan of Care Review  Recent Flowsheet Documentation  Taken 2/16/2021 0914 by Jade Maradiaga, PT  Progress: improving  Plan of Care Reviewed With: patient  Outcome Summary: Patient increased gait distance to 135 feet with CGA for safety, limited by fatigue. O2 sats improved with gait today, only dropping to 92% on room air. Increased stability with ambulation and t/f today. Will continue to progress as able.   Goal Outcome Evaluation:  Plan of Care Reviewed With: patient  Progress: improving  Outcome Summary: Patient increased gait distance to 135 feet with CGA for safety, limited by fatigue. O2 sats improved with gait today, only dropping to 92% on room air. Increased stability with ambulation and t/f today. Will continue to progress as able.

## 2021-02-16 NOTE — THERAPY TREATMENT NOTE
Patient Name: Keely Haider  : 1945    MRN: [id number]                              Today's Date: 2021       Admit Date: 2021    Visit Dx:     ICD-10-CM ICD-9-CM   1. Closed fracture of both humeri, initial encounter  S42.301A 819.0    S42.302A    2. Closed fracture of nasal bone, initial encounter  S02.2XXA 802.0   3. Fall due to slipping on ice or snow, initial encounter  W00.9XXA E885.9     Patient Active Problem List   Diagnosis   • Humerus fracture-bilateral   • Idiopathic scoliosis of thoracolumbar spine   • Left hip pain   • Essential hypertension   • Seizure (CMS/HCC)   • Neck pain     Past Medical History:   Diagnosis Date   • Hypertension    • Osteoarthritis of left hip    • Osteoporosis    • Scoliosis    • Seizures (CMS/HCC)      Past Surgical History:   Procedure Laterality Date   • ABDOMINAL HYSTERECTOMY       General Information     Row Name 21 1150          OT Time and Intention    Document Type  therapy note (daily note)  -JY     Mode of Treatment  occupational therapy;individual therapy  -JY     Row Name 21 1150          General Information    Patient Profile Reviewed  yes  -JY     Existing Precautions/Restrictions  fall;non-weight bearing;left;right;shoulder;other (see comments) NWB BUEs, B humeral fx, interscalene L UE, R UE no operative, B UE in donjoy ultra II sling w/ no pillow, monitor pt O2  -JY     Row Name 21 1150          Cognition    Orientation Status (Cognition)  oriented x 4  -JY     Row Name 21 1150          Safety Issues, Functional Mobility    Safety Issues Affecting Function (Mobility)  safety precaution awareness;safety precautions follow-through/compliance;insight into deficits/self-awareness  -JY     Impairments Affecting Function (Mobility)  balance;strength;coordination;endurance/activity tolerance;pain;range of motion (ROM);shortness of breath  -JY     Comment, Safety Issues/Impairments (Mobility)  pt alert, follows commands; appears  more insightful to deficits and more cognizant of tech, strategies (progressing); did not report feeling LH or dizzy with upright standing this date, intermittently with change in position (forward flexion at trunk) with dressing  -JY       User Key  (r) = Recorded By, (t) = Taken By, (c) = Cosigned By    Initials Name Provider Type    Citlalli Elmore OT Occupational Therapist          Mobility/ADL's     Row Name 02/16/21 1256          Bed Mobility    Bed Mobility  other (see comments) pt received UIC as pt preference to sleep for comfort and BUE positioning thus bed mobiity not assessed this date  -JY     Scooting/Bridging [place] (Bed Mobility)  not tested  -JY     Supine-Sit [place] (Bed Mobility)  not tested  -JY     Comment (Bed Mobility)  pt received UIC as pt preference to sleep for comfort and BUE positioning thus bed mobiity not assessed this date  -JY     Row Name 02/16/21 1252          Transfers    Transfers  sit-stand transfer;toilet transfer;other (see comments) toilet t/f i.e. recliner < > BSC  -JY     Comment (Transfers)  verbal cues to scoot forward toward front of chair for BLE/feet on floor and to use core and LE strength to stand while adhering to BUE precautions; pt denied any dizziness or LH upon standing; pt did demonstrate shuffling of B feet in t/f to BSC thus increased A and cues provided  -JY     Sit-Stand [place] (Transfers)  contact guard;verbal cues  -JY     [place] Level (Toilet Transfer)  minimum assist (75% patient effort);verbal cues  -JY     Assistive Device (Toilet Transfer)  commode, bedside without drop arms;other (see comments) gait belt support  -[place]     Row Name 02/16/21 1255          Sit-Stand Transfer    Assistive Device (Sit-Stand Transfers)  other (see comments) no AD, gait belt support  -Combat StrokeY     Row Name 02/16/21 1253          Toilet Transfer    Type (Toilet Transfer)  stand pivot/stand step  -     Row Name 02/16/21 1251          Functional Mobility     Functional Mobility- Comment  defer to PT for spepcifics  -JANGELICA     Row Name 02/16/21 1259          Activities of Daily Living    BADL Assessment/Intervention  bathing;upper body dressing;toileting;feeding  -SOLOMON     Row Name 02/16/21 1259          Mobility    Extremity Weight-bearing Status  left upper extremity;right upper extremity  -JY     Left Upper Extremity (Weight-bearing Status)  (S) non weight-bearing (NWB)  -JY     Right Upper Extremity (Weight-bearing Status)  (S) non weight-bearing (NWB)  -JY     Row Name 02/16/21 1259          Bathing Assessment/Intervention    [place] Level (Bathing)  bathing skills;upper body;other (see comments);maximum assist (25% patient effort);verbal cues B axilla care  -JY     Assistive Devices (Bathing)  other (see comments) harish tech with further restrictions d/t B humeral fx  -JY     Position (Bathing)  unsupported sitting  -JY     Comment (Bathing)  no CG/family present for teaching; re educated pt on optimal position (seated pendulum) to complete task and general harish tech however w/ limited use of opposite UE (w/ NWB) to complete flossing tech for wash; pt will grossly require max A given BUE impact d/t limited reach and limited use of opposite UE; pt did present with more ability to use RUE for elbow flex/ext to wash L side; reviewed no showering while L UE nerve catheter still in place  -     Row Name 02/16/21 1259          Upper Body Dressing Assessment/Training    [place] Level (Upper Body Dressing)  don;doff;pajama/robe;other (see comments);maximum assist (25% patient effort);dependent (less than 25% patient effort);verbal cues sling  -JY     Assistive Devices (Upper Body Dressing)  other (see comments) harish tech with additional restrictions given B humeral fx  -JY     Position (Upper Body Dressing)  supported sitting;unsupported sitting  -JY     Comment (Upper Body Dressing)  no CG/family present foor teaching; reiterated to pt importance of being able to  direct and lead care being provided to her given reality of increased A req'd for B humeral fx, pt able to assist only minimally in unadhering neck strap with limited elbow flex/ext; dep for gross sling mgmt d/d d/t immobilization and BUE precautions; re iterated the optimal sequencing order for threading/unthreading and mgmt around nerve cath at E; only very intermittent max A with RUE to help manage L side of garments with grasp and elbow flex/ext  -JY     Row Name 02/16/21 1259          Lower Body Dressing Assessment/Training    [place] Level (Lower Body Dressing)  not tested  -JY     Row Name 02/16/21 1259          Self-Feeding Assessment/Training    [place] Level (Feeding)  liquids to mouth;standby assist;set up  -JY     Assistive Devices (Feeding)  adapted cup;scoop dish/plate guard  -JY     Position (Self-Feeding)  supported sitting  -JY     Comment (Feeding)  educated pt on both adapted cup with straw and scoop dish with guarded side after issuing last session. Educated on purpose of each and optimal set up to use with grossly limited RUE use (elbow, wrist, hand ROM); pt reported increased ease in task with cup with stray positioned for R hand and further anticipated improved use of plate at midline and elevated vs on tabletop  -JY     Row Name 02/16/21 1255          Toileting Assessment/Training    [place] Level (Toileting)  adjust/manage clothing;perform perineal hygiene;dependent (less than 25% patient effort)  -JY     Assistive Devices (Toileting)  commode, bedside without drop arms  -JY     Position (Toileting)  unsupported sitting;supported standing  -JY     Comment (Toileting)  pt grossly dep for cloth mgmt and hygiene pre/postt toileting given BUEs immobilized and NWB orders; req'd stability of BSC with adjustable height and arms for barrier  -JY       User Key  (r) = Recorded By, (t) = Taken By, (c) = Cosigned By    Initials Name Provider Type    Citlalli Elmore, ADAMS Occupational  Therapist        Obj/Interventions     Row Name 02/16/21 1315          Sensory Assessment (Somatosensory)    Sensory Assessment (Somatosensory)  left UE  -JY     Left UE Sensory Assessment  general sensation;light touch awareness;light touch localization;other (see comments) diminished sensation throughout LUE, improved from last session, proximal > distal, tingling reported at All digits of L hand post TE  -JY     Sensory Subjective Reports  tingling  -JY     Row Name 02/16/21 1315          Sensory Interventions    Comment, Sensory Intervention  diminished sensation throughout LUE, improved from last session, proximal > distal, tingling reported at All digits of L hand post TE  -JY     Row Name 02/16/21 1315          Vision Assessment/Intervention    Visual Impairment/Limitations  corrective lenses for reading  -JY     Row Name 02/16/21 1315          Elbow/Forearm (Therapeutic Exercise)    Elbow/Forearm (Therapeutic Exercise)  AROM (active range of motion)  -JY     Elbow/Forearm AROM (Therapeutic Exercise)  bilateral;flexion;extension;supination;pronation;sitting;10 repetitions  -JY     Row Name 02/16/21 1315          Wrist (Therapeutic Exercise)    Wrist (Therapeutic Exercise)  AROM (active range of motion)  -JY     Wrist AROM (Therapeutic Exercise)  bilateral;flexion;extension;10 repetitions  -JY     [facility] Name 02/16/21 1315          Hand (Therapeutic Exercise)    Hand (Therapeutic Exercise)  AROM (active range of motion)  -JY     Hand AROM/AAROM (Therapeutic Exercise)  bilateral;AROM (active range of motion);finger flexion;finger extension;10 repetitions  -JY     Row Name 02/16/21 1315          Balance    Balance Assessment  sitting static balance;sitting dynamic balance;standing static balance;standing dynamic balance  -JY     Static Sitting Balance  WFL;supported;unsupported;sitting in chair  -JY     Dynamic Sitting Balance  WFL;supported;unsupported;sitting in chair;other (see comments) ADLs, HEP  -JY      Static Standing Balance  WFL;supported;standing  -JY     Dynamic Standing Balance  mild impairment;supported;standing;other (see comments) fxl transfer  -JY     Balance Interventions  sitting;standing;static;dynamic;sit to stand;supported;occupation based/functional task  -JY     Comment, Balance  mild unsteadiness noted with fxl transfer with pt shuffling feet, GB support throughout  -JY     Row Name 02/16/21 1315          Therapeutic Exercise    Therapeutic Exercise  elbow/forearm;wrist;hand;other (see comments) OT re educated pt on HEP for BUEs with focus on B elbow/hand/wrist ROM x 10 reps to be completed 3-5 times per day; pt able to complete all reps with AROM this date with intermittent cues, support for positioning  -JY       User Key  (r) = Recorded By, (t) = Taken By, (c) = Cosigned By    Initials Name Provider Type    Citlalli Elmore OT Occupational Therapist        Goals/Plan    No documentation.       Clinical Impression     Row Name 02/16/21 1318          Pain Assessment    Additional Documentation  Pain Scale: Numbers Pre/Post-Treatment (Group)  -JY     Row Name 02/16/21 1318          Pain Scale: Numbers Pre/Post-Treatment    Pretreatment Pain Rating  3/10  -JY     Posttreatment Pain Rating  5/10  -JY     Pain Location - Side  Left  -JY     Pain Location - Orientation  lateral  -JY     Pain Location  neck  -JY     Pre/Posttreatment Pain Comment  pt reported neck pain this date however decreased since last OT session, some intermittent pain with HEP at LUE primarily  -JY     Pain Intervention(s)  Repositioned;Ambulation/increased activity  -JY     Row Name 02/16/21 1318          Plan of Care Review    Plan of Care Reviewed With  patient  -JY     Progress  improving  -JY     Outcome Summary  Pt alert, O x 4, agreeable to OT tx. Pt still prefers to sleep in recliner d/t comfort and positioning of UEs thus bed mob not assessed. Pt completed seated UBD of garments and sling with gross dependency,  intermittent max A w/ ability to unadhere neck strap from loops (u/a to remove from neck and adhere to precautions) and use available elbow flex/ext and hand grasp to minimally pull garment down on opposite side however requires initiation A. Pt completed B axilla care with gross max A limited by reach with restricted ROM Eliu at UEs. Pt educated on AE for feeding to assist with t/f to mouth including adapted cup with straw and scoop plate w/ guard, pt verbalizes/demo competency in cup and presents with increased I. Pt completed toileting requiring dep care for hygiene and cloth mgmt at [facility]. Pt completed STS with CGA and fxl t/f between recliner and BSC with min A with observation of shuffling feet, cued for stepping. No dizziness noted in standing or t/f yet with repeated positional change for UBD. OT collectively reviewed optimal position, tech, strategy and B UE precautions for ADLs and related mobility. Pt completed HEP as indicated by MD with focus on BUE ROM at elbow/wrist/hands with improved AROM throughout this date at BUEs, all reps. Pt reported mild increase in pain, transient with HEP. Will progress pt as able while hospitalized, continue to recommend further rehab at d/c when medically ready.  -     Row Name 02/16/21 1318          Therapy Plan Review/Discharge Plan (OT)    Anticipated Discharge Disposition (OT)  inpatient rehabilitation facility  -     Row Name 02/16/21 1318          Vital Signs    Pre Systolic BP Rehab  155  -JY     Pre Treatment Diastolic BP  57  -JY     Pretreatment Heart Rate (beats/min)  93  -JY     Posttreatment Heart Rate (beats/min)  82  -JY     Pre SpO2 (%)  95  -JY     O2 Delivery Pre Treatment  room air  -JY     Intra SpO2 (%)  91  -JY     O2 Delivery Intra Treatment  room air  -JY     Post SpO2 (%)  95  -JY     O2 Delivery Post Treatment  room air  -JY     Pre Patient Position  Sitting  -JY     Intra Patient Position  Standing  -JY     Post Patient Position  Sitting  -JY      Row Name 02/16/21 1318          Positioning and Restraints    Pre-Treatment Position  sitting in chair/recliner  -JY     Post Treatment Position  chair  -JY     In Chair  notified nsg;reclined;call light within reach;encouraged to call for assist;exit alarm on;waffle cushion;with brace;legs elevated BUEs in sling, interscalene at L UE intact, ice pack donned to L shoulder, pt deferred SCDs  -JY       User Key  (r) = Recorded By, (t) = Taken By, (c) = Cosigned By    Initials Name Provider Type    Citlalli Elmore OT Occupational Therapist        Outcome Measures     Row Name 02/16/21 1329          How much help from another is currently needed...    Putting on and taking off regular lower body clothing?  1  -JY     Bathing (including washing, rinsing, and drying)  2  -JY     Toileting (which includes using toilet bed pan or urinal)  1  -JY     Putting on and taking off regular upper body clothing  2  -JY     Taking care of personal grooming (such as brushing teeth)  2  -JY     Eating meals  2  -JY     AM-PAC 6 Clicks Score (OT)  10  -JY     Row Name 02/16/21 1329          Functional Assessment    Outcome Measure Options  AM-PAC 6 Clicks Daily Activity (OT)  -JY       User Key  (r) = Recorded By, (t) = Taken By, (c) = Cosigned By    Initials Name Provider Type    Citlalli Elmore OT Occupational Therapist        Occupational Therapy Education                 Title: PT OT SLP Therapies (In Progress)     Topic: Occupational Therapy (In Progress)     Point: ADL training (In Progress)     Description:   Instruct learner(s) on proper safety adaptation and remediation techniques during self care or transfers.   Instruct in proper use of assistive devices.              Learning Progress Summary           Patient Acceptance, E,D, NR by SOLOMON at 2/16/2021 1004    Acceptance, E,D, NR by SOLOMON at 2/15/2021 0815    Acceptance, TB,E,D,H, VU,DU,NR by  at 2/14/2021 1551                   Point: Home exercise program (In Progress)      Description:   Instruct learner(s) on appropriate technique for monitoring, assisting and/or progressing therapeutic exercises/activities.              Learning Progress Summary           Patient Acceptance, E,D, NR by SOLOMON at 2/16/2021 1004    Acceptance, E,D, NR by JY at 2/15/2021 0815    Acceptance, TB,E,D,H, VU,DU,NR by  at 2/14/2021 1551                   Point: Precautions (In Progress)     Description:   Instruct learner(s) on prescribed precautions during self-care and functional transfers.              Learning Progress Summary           Patient Acceptance, E,D, NR by JANGELICA at 2/16/2021 1004    Acceptance, E,D, NR by JY at 2/15/2021 0815    Acceptance, TB,E,D,H, VU,DU,NR by  at 2/14/2021 1551                   Point: Body mechanics (In Progress)     Description:   Instruct learner(s) on proper positioning and spine alignment during self-care, functional mobility activities and/or exercises.              Learning Progress Summary           Patient Acceptance, E,D, NR by SOLOMON at 2/16/2021 1004    Acceptance, E,D, NR by JANGELICA at 2/15/2021 0815    Acceptance, TB,E,D,H, VU,DU,NR by  at 2/14/2021 1551                               User Key     Initials Effective Dates Name Provider Type Discipline     03/07/18 -  Fina Cooper, OT Occupational Therapist OT     01/29/20 -  Citlalli Elena OT Occupational Therapist OT              OT Recommendation and Plan     Plan of Care Review  Plan of Care Reviewed With: patient  Progress: improving  Outcome Summary: Pt alert, O x 4, agreeable to OT tx. Pt still prefers to sleep in recliner d/t comfort and positioning of UEs thus bed mob not assessed. Pt completed seated UBD of garments and sling with gross dependency, intermittent max A w/ ability to unadhere neck strap from loops (u/a to remove from neck and adhere to precautions) and use available elbow flex/ext and hand grasp to minimally pull garment down on opposite side however requires initiation A. Pt completed B  axilla care with gross max A limited by reach with restricted ROM Eliu at UEs. Pt educated on AE for feeding to assist with t/f to mouth including adapted cup with straw and scoop plate w/ guard, pt verbalizes/demo competency in cup and presents with increased I. Pt completed toileting requiring dep care for hygiene and cloth mgmt at [facility] [place]. Pt completed STS with CGA and fxl t/f between recliner and BSC with min A with observation of shuffling feet, cued for stepping. No dizziness noted in standing or t/f yet with repeated positional change for UBD. OT collectively reviewed optimal position, tech, strategy and B UE precautions for ADLs and related mobility. Pt completed HEP as indicated by MD with focus on BUE ROM at elbow/wrist/hands with improved AROM throughout this date at BUEs, all reps. Pt reported mild increase in pain, transient with HEP. Will progress pt as able while hospitalized, continue to recommend further rehab at d/c when medically ready.     Time Calculation:   Time Calculation- OT     Row Name 02/16/21 1331 02/16/21 0914          Time Calculation- OT    OT Start Time  1004  -JY  --     OT Received On  02/16/21  -JY  --     OT Goal Re-Cert Due Date  02/24/21  -JY  --        Timed Charges    24084 - OT Therapeutic Exercise Minutes  25  -JY  --     84803 - Gait Training Minutes   --  18  -LR     47976 - OT Therapeutic Activity Minutes  18  -JY  --     59248 - OT Self Care/Mgmt Minutes  25  -JY  --       User Key  (r) = Recorded By, (t) = Taken By, (c) = Cosigned By    Initials Name Provider Type    Jade Hedrick, PT Physical Therapist    Citlalli Elmore OT Occupational Therapist        Therapy Charges for Today     Code Description Service Date Service Provider Modifiers Qty    95799243574 HC OT THER PROC EA 15 MIN 2/15/2021 Citlalli Elena OT GO 2    07611550124 HC OT THERAPEUTIC ACT EA 15 MIN 2/15/2021 Citlalli Elena OT GO 1    04444786776 HC OT SELF CARE/MGMT/TRAIN EA 15 MIN 2/15/2021  Young, Citlalli, ADAMS GO 2    83573278273 HC OT SELF CARE/MGMT/TRAIN EA 15 MIN 2/16/2021 Citlalli Elena OT GO 2    97032672665 HC OT THERAPEUTIC ACT EA 15 MIN 2/16/2021 Citlalli Elena OT GO 1    36729501012 HC OT THER PROC EA 15 MIN 2/16/2021 Citlalli Elena OT GO 2               Citlalli Elena OT  2/16/2021

## 2021-02-16 NOTE — PROGRESS NOTES
[facility] Medicine Services  PROGRESS NOTE    Patient Name: Keely Haider  : 1945  MRN: [id number]    Date of Admission: 2021  Primary Care Physician: Provider, No Known    Subjective   Subjective   CC:  F/U bilateral humerus fractures     HPI:  Patient sitting up on the end of the chair working with OT and doing some of her exercises.  Patient very positive and talkative and states she is ready to go to rehab so she can go home to her family.  According to night shift nurse patient was ambulating in the room with the nurse or sleeping in the chair more comfortable.  Still no BM documented, but patient is not eating a lot.  No family in the room.  Waiting for rehab.    ROS:  Gen- No fevers, chills  CV- No chest pain, palpitations  Resp- No cough, dyspnea  GI- No N/V/D, abd pain  MS- +bilateral arm pain  All other systems have been reviewed and the pertinent positives and negatives are listed above in the HPI or ROS    Objective   Objective   Vital Signs:   Temp:  [98.6 °F (37 °C)-98.8 °F (37.1 °C)] 98.8 °F (37.1 °C)  Heart Rate:  [74-88] 83  Resp:  [16-18] 16  BP: (121-155)/(45-60) 155/57  Physical Exam:  Constitutional: Alert, WD, elderly female sitting up in chair working with PT in NAD  Eyes: EOMI, sclerae anicteric, no conjunctival injection  Head: NCAT  ENT: [place], moist mucous membranes   Respiratory: Nonlabored, symmetrical chest expansion, CTAB  Cardiovascular: RRR, no M/R/G, +DP pulses bilaterally  Gastrointestinal: Soft, NT, ND +BS  Musculoskeletal: ECKERT; no LE edema bilaterally; arms with slings in place bilaterally; ice pack to left shoulder  Neurologic: Oriented x4, strength symmetric in all extremities, follows all commands, speech clear  Skin: No rashes on exposed skin; Right bruised eye and cheek; lateral right eye lid scab; scab on bridge of nose  Psychiatric: Pleasant and cooperative; normal affect    Results Reviewed:  Results from last 7 days   Lab Units  02/15/21  0736 02/12/21 2036   WBC 10*3/mm3 9.44 15.84*   HEMOGLOBIN g/dL 8.8* 12.1   HEMATOCRIT % 27.4* 35.5   PLATELETS 10*3/mm3 200 248     Results from last 7 days   Lab Units 02/15/21  0736 02/12/21 2036   SODIUM mmol/L 132* 133*   POTASSIUM mmol/L 3.9 4.2   CHLORIDE mmol/L 101 101   CO2 mmol/L 25.0 23.0   BUN mg/dL 7* 11   CREATININE mg/dL 0.40* 0.50*   GLUCOSE mg/dL 115* 118*   CALCIUM mg/dL 8.8 9.2   ALT (SGPT) U/L  --  17   AST (SGOT) U/L  --  26     Estimated Creatinine Clearance: 51.9 mL/min (A) (by C-G formula based on SCr of 0.4 mg/dL (L)).    Microbiology Results Abnormal     Procedure Component Value - Date/Time    Urine Culture - Urine, Urine, Clean Catch [[id number]]  (Abnormal)  (Susceptibility) Collected: 02/13/21 1719    Lab Status: Final result Specimen: Urine, Clean Catch Updated: 02/15/21 0902     Urine Culture >100,000 CFU/mL Klebsiella pneumoniae ssp pneumoniae    Susceptibility      Klebsiella pneumoniae ssp pneumoniae     GEOVANNY     Ampicillin Resistant     Ampicillin + Sulbactam Susceptible     Cefazolin Susceptible     Cefepime Susceptible     Ceftazidime Susceptible     Ceftriaxone Susceptible     Gentamicin Susceptible     Levofloxacin Susceptible     Nitrofurantoin Susceptible     Piperacillin + Tazobactam Susceptible     Tetracycline Susceptible     Trimethoprim + Sulfamethoxazole Susceptible                    COVID PRE-OP / PRE-PROCEDURE SCREENING ORDER (NO ISOLATION) - Swab, Nasopharynx [[id number]]  (Normal) Collected: 02/12/21 2352    Lab Status: Final result Specimen: Swab from Nasopharynx Updated: 02/13/21 0404    Narrative:      The following orders were created for panel order COVID PRE-OP / PRE-PROCEDURE SCREENING ORDER (NO ISOLATION) - Swab, Nasopharynx.  Procedure                               Abnormality         Status                     ---------                               -----------         ------                     COVID-19 and FLU A/B PCR...[[id number]]  Normal               Final result                 Please view results for these tests on the individual orders.    COVID-19 and FLU A/B PCR - Swab, Nasopharynx [[id number]]  (Normal) Collected: 02/12/21 2352    Lab Status: Final result Specimen: Swab from Nasopharynx Updated: 02/13/21 0404     COVID19 Not Detected     Influenza A PCR Not Detected     Influenza B PCR Not Detected    Narrative:      Fact sheet for providers: https://www.fda.gov/media/916475/download    Fact sheet for patients: https://www.fda.gov/media/439165/download    Test performed by PCR.          Imaging Results (Last 24 Hours)     Procedure Component Value Units Date/Time    FL C Arm During Surgery [[id number]] Collected: 02/14/21 1850     Updated: 02/15/21 2214    Narrative:      EXAMINATION: FL C ARM DURING SURGERY - 02/14/2021      INDICATION: S42.301A-Unspecified fracture of shaft of humerus, right  arm, initial encounter for closed fracture; S42.302A-Unspecified  fracture of shaft of humerus, left arm, initial encounter for closed  fracture; S02.2XXA-Fracture of nasal bones, initial encounter for closed  fracture; W00.9XXA-Unspecified fall due to ice and snow, initial  encounter. Left proximal humerus pain.     COMPARISON: None.     FINDINGS: Dictation is to record 1 minute of fluoroscopy time. Total for  each procedure images obtained show initial pin fixation of the  patient's proximal humerus fracture and subsequent lateral side plate  and multiple screw fixation with the fracture fragments appearing in  close anatomic alignment on the final two images. Please see the  procedure report for full details.       Impression:      Fluoroscopy provided during ORIF of the left proximal  humerus.     DICTATED:   02/14/2021  EDITED/ls :   02/14/2021        This report was finalized on 2/15/2021 10:11 PM by Dr. Eagle Davis MD.           I have reviewed the medications:  Scheduled Meds:amLODIPine, 10 mg, Oral, Daily  aspirin, 81 mg, Oral, Daily  atorvastatin, 20  mg, Oral, Daily  calcium carb-cholecalciferol, 1 tablet, Oral, Daily  cefTRIAXone, 1 g, Intravenous, Q24H  citalopram, 10 mg, Oral, Daily  gabapentin, 300 mg, Oral, Daily With Breakfast  levETIRAcetam, 1,000 mg, Oral, Nightly  levETIRAcetam, 1,500 mg, Oral, QAM  losartan-HCTZ (HYZAAR) 50-12.5 combo dose, , Oral, Daily  polyethylene glycol, 17 g, Oral, Daily      Continuous Infusions:Ropivacine HCl-NaCl, 6 mL/hr, Last Rate: 6 mL/hr (02/15/21 2020)  sodium chloride, 50 mL/hr, Last Rate: 50 mL/hr (02/14/21 1210)      PRN Meds:.•  acetaminophen **OR** acetaminophen  •  bisacodyl  •  HYDROcodone-acetaminophen  •  HYDROcodone-acetaminophen  •  HYDROmorphone **AND** naloxone  •  magnesium citrate  •  ondansetron **OR** ondansetron  •  senna-docusate sodium    Assessment/Plan   Assessment & Plan     Active Hospital Problems    Diagnosis  POA   • Humerus fracture-bilateral [S42.309A]  Yes   • Idiopathic scoliosis of thoracolumbar spine [M41.25]  Yes   • Left hip pain [M25.552]  Yes   • Essential hypertension [I10]  Yes   • Seizure (CMS/HCC) [R56.9]  Yes   • Neck pain [M54.2]  Yes      Resolved Hospital Problems   No resolved problems to display.     Brief Hospital Course to date:  Keely Haider is a 75 y.o. female with past medical history only significant for hypertension, osteoarthritis osteoporosis, scoliosis, chronic left hip pain as well as a seizure disorder who had a fall on the ice and broke both of her humeri.    Bilateral humeral fracture  -Dr. Loza following, s/p ORIF for L proximal humerus Fx   -R humerus Fx to be treated with sling  -Pain control  -PT/OT  -Per ortho note, will remain in the sling until she is seen back in clinic at approx 10 days postop. She can come out of the sling for elbow wrist and hand ROM.  They will start pendulum exercises at  first postop visit.  --Placement  pending    Seizure disorder  -Continue Keppra     Hypertension-improved  -BP elevated yesterday  -Continue Norvasc,  Lisinopril     Hyponatremia-mild  --Na 132  --AM labs    UTI  -Urine Cx +Klebsiella pneumoniae  -Continue Rocephin 1g IV daily x3d; finished    Chronic osteoarthritis and osteoporosis       DVT prophylaxis: Mechanical due to surgery    Disposition: I expect the patient to be discharged TBD placement    CODE STATUS:   Code Status and Medical Interventions:   Ordered at: 02/12/21 2159     Code Status:    CPR     Medical Interventions (Level of Support Prior to Arrest):    Full     Jade Perkins, APRN  02/16/21

## 2021-02-17 LAB
ANION GAP SERPL CALCULATED.3IONS-SCNC: 7 MMOL/L (ref 5–15)
BUN SERPL-MCNC: 8 MG/DL (ref 8–23)
BUN/CREAT SERPL: 21.1 (ref 7–25)
CALCIUM SPEC-SCNC: 8.9 MG/DL (ref 8.6–10.5)
CHLORIDE SERPL-SCNC: 102 MMOL/L (ref 98–107)
CO2 SERPL-SCNC: 26 MMOL/L (ref 22–29)
CREAT SERPL-MCNC: 0.38 MG/DL (ref 0.57–1)
DEPRECATED RDW RBC AUTO: 41.1 FL (ref 37–54)
ERYTHROCYTE [DISTWIDTH] IN BLOOD BY AUTOMATED COUNT: 12.2 % (ref 12.3–15.4)
GFR SERPL CREATININE-BSD FRML MDRD: >150 ML/MIN/1.73
GLUCOSE SERPL-MCNC: 99 MG/DL (ref 65–99)
HCT VFR BLD AUTO: 26.5 % (ref 34–46.6)
HGB BLD-MCNC: 8.8 G/DL (ref 12–15.9)
MCH RBC QN AUTO: 30.7 PG (ref 26.6–33)
MCHC RBC AUTO-ENTMCNC: 33.2 G/DL (ref 31.5–35.7)
MCV RBC AUTO: 92.3 FL (ref 79–97)
PLATELET # BLD AUTO: 264 10*3/MM3 (ref 140–450)
PMV BLD AUTO: 9.5 FL (ref 6–12)
POTASSIUM SERPL-SCNC: 4 MMOL/L (ref 3.5–5.2)
RBC # BLD AUTO: 2.87 10*6/MM3 (ref 3.77–5.28)
SARS-COV-2 RNA RESP QL NAA+PROBE: NOT DETECTED
SODIUM SERPL-SCNC: 135 MMOL/L (ref 136–145)
WBC # BLD AUTO: 7.92 10*3/MM3 (ref 3.4–10.8)

## 2021-02-17 PROCEDURE — U0003 INFECTIOUS AGENT DETECTION BY NUCLEIC ACID (DNA OR RNA); SEVERE ACUTE RESPIRATORY SYNDROME CORONAVIRUS 2 (SARS-COV-2) (CORONAVIRUS DISEASE [COVID-19]), AMPLIFIED PROBE TECHNIQUE, MAKING USE OF HIGH THROUGHPUT TECHNOLOGIES AS DESCRIBED BY CMS-2020-01-R: HCPCS | Performed by: INTERNAL MEDICINE

## 2021-02-17 PROCEDURE — 97110 THERAPEUTIC EXERCISES: CPT

## 2021-02-17 PROCEDURE — 97530 THERAPEUTIC ACTIVITIES: CPT

## 2021-02-17 PROCEDURE — 80048 BASIC METABOLIC PNL TOTAL CA: CPT | Performed by: NURSE PRACTITIONER

## 2021-02-17 PROCEDURE — 97116 GAIT TRAINING THERAPY: CPT

## 2021-02-17 PROCEDURE — 97535 SELF CARE MNGMENT TRAINING: CPT

## 2021-02-17 PROCEDURE — 85027 COMPLETE CBC AUTOMATED: CPT | Performed by: NURSE PRACTITIONER

## 2021-02-17 PROCEDURE — 99232 SBSQ HOSP IP/OBS MODERATE 35: CPT | Performed by: NURSE PRACTITIONER

## 2021-02-17 RX ADMIN — Medication 1 TABLET: at 09:23

## 2021-02-17 RX ADMIN — ASPIRIN 81 MG: 81 TABLET, CHEWABLE ORAL at 09:22

## 2021-02-17 RX ADMIN — LEVETIRACETAM 1000 MG: 500 TABLET, FILM COATED ORAL at 20:37

## 2021-02-17 RX ADMIN — CITALOPRAM HYDROBROMIDE 10 MG: 20 TABLET ORAL at 09:25

## 2021-02-17 RX ADMIN — LEVETIRACETAM 1500 MG: 750 TABLET, FILM COATED ORAL at 09:23

## 2021-02-17 RX ADMIN — POLYETHYLENE GLYCOL 3350 17 G: 17 POWDER, FOR SOLUTION ORAL at 09:23

## 2021-02-17 RX ADMIN — HYDROCODONE BITARTRATE AND ACETAMINOPHEN 2 TABLET: 5; 325 TABLET ORAL at 09:22

## 2021-02-17 RX ADMIN — GABAPENTIN 300 MG: 300 CAPSULE ORAL at 09:23

## 2021-02-17 RX ADMIN — DOCUSATE SODIUM 50MG AND SENNOSIDES 8.6MG 2 TABLET: 8.6; 5 TABLET, FILM COATED ORAL at 09:30

## 2021-02-17 RX ADMIN — LOSARTAN POTASSIUM: 50 TABLET, FILM COATED ORAL at 09:22

## 2021-02-17 RX ADMIN — ATORVASTATIN CALCIUM 20 MG: 20 TABLET, FILM COATED ORAL at 09:23

## 2021-02-17 RX ADMIN — AMLODIPINE BESYLATE 10 MG: 10 TABLET ORAL at 09:23

## 2021-02-17 NOTE — PLAN OF CARE
Problem: Adult Inpatient Plan of Care  Goal: Plan of Care Review  Recent Flowsheet Documentation  Taken 2/17/2021 1503 by Juli Avery, PT  Progress: improving  Plan of Care Reviewed With: patient  Outcome Summary: Patient able to ambulate 140 feet with contact guard. SHe required contact suport for safety. Her gait is slightly unsteady and cautious. Her SATs were stable at 98% RA.   Goal Outcome Evaluation:  Plan of Care Reviewed With: patient  Progress: improving  Outcome Summary: Patient able to ambulate 140 feet with contact guard. SHe required contact suport for safety. Her gait is slightly unsteady and cautious. Her SATs were stable at 98% RA.

## 2021-02-17 NOTE — PLAN OF CARE
Goal Outcome Evaluation:  Plan of Care Reviewed With: patient  Progress: improving   Pt currently in chair resting quietly. No complaints of pain or discomfort at this time. Ropivicaine at 6ml to LUE. Aquacel in place. Bilateral upper extremities in slings. Pt ambulatory to bathroom. Vitals WNL on room air. Will continue to monitor, call bell in reach.

## 2021-02-17 NOTE — PLAN OF CARE
Problem: Adult Inpatient Plan of Care  Goal: Plan of Care Review  Recent Flowsheet Documentation  Taken 2/17/2021 5068 by Citlalli Elena, OT  Progress: improving  Plan of Care Reviewed With: (son present for partial session)   patient   son   other (see comments)  Outcome Summary: Pt alert, O x 4, agreeable to OT tx. Pt continues to prefer to sleep in recliner for comfort and positioning of UEs thus bed mobility not assessed this date. Pt reported perceived gains, improvement in self feeding with issued adaptive cup w/ handle and longest available straw and scoop plate with guard, did not assess pt self feeding thouhg. Pt completed seated UBD of garments and sling with gross dependency intermittent max A w/ ability to unadhere neck strap from loops (u/a to remove from neck and adhere to precautions) and use available elbow flex/ext and hand grasp to minimally pull garment down on opposite side. Pt completed B axilla care with gross max A limited by reach with restricted ROM Eliu at UEs. Pt grossly reaches anterior area and requires max A for posterior. Pt completed STS t/f x 3 reps with progression from CGA to SBA with inital cue to scoot forward to front of chair and use core/LE strength to ascend and use posterior tactile sense to ensure closeness to seated surface prior to sitting, pt adhered to NWB BUEs throughout. Pt did not note any dizziness or feeling LH with change in position to standing or to pendulum for ADLs . OT collectively reviewed optimal position, tech, strategy and BUE precautions for ADLs and related mobility. Pt completed HEP as indicated by MD with focus on BUE ROM at elbow/wrist/hands with improved  AROM throughout this date at BUEs, all reps. Will progress pt as able while hospitalized, continue to recommend further rehab at d/c when medically ready.

## 2021-02-17 NOTE — PROGRESS NOTES
[facility]    Acute pain service Inpatient Progress Note    Patient Name: Keely Haider  :  1945  MRN:  [id number]        Acute Pain  Service Inpatient Progress Note:    Analgesia:Fair  Pain Score:5/10  LOC: alert and awake  Resp Status: room air  Cardiac: VS stable  Side Effects:None  Catheter Site:clean, dressing intact and dry  Cath type: peripheral nerve cath with ON Q  Infusion rate: 6ml/hr  Catheter Plan:Catheter to remain Insitu and Continue catheter infusion rate unchanged  Comments: ---------------------------------------------------------------------------------  Physical Therapy Manual Muscle Testing Results:   ]    Physical Therapy - Plan of Care Review - Outcome Summary:  Outcome Summary: Patient increased gait distance to 135 feet with CGA for safety, limited by fatigue. O2 sats improved with gait today, only dropping to 92% on room air. Increased stability with ambulation and t/f today. Will continue to progress as able. (21)]    Occupational Therapy - Plan of Care Review - Outcome Summary:  Outcome Summary: Pt alert, O x 4, agreeable to OT tx. Pt still prefers to sleep in recliner d/t comfort and positioning of UEs thus bed mob not assessed. Pt completed seated UBD of garments and sling with gross dependency, intermittent max A w/ ability to unadhere neck strap from loops (u/a to remove from neck and adhere to precautions) and use available elbow flex/ext and hand grasp to minimally pull garment down on opposite side however requires initiation A. Pt completed B axilla care with gross max A limited by reach with restricted ROM Eliu at UEs. Pt educated on AE for feeding to assist with t/f to mouth including adapted cup with straw and scoop plate w/ guard, pt verbalizes/demo competency in cup and presents with increased I. Pt completed toileting requiring dep care for hygiene and cloth mgmt at [facility]. Pt completed STS with CGA and fxl t/f between recliner and BSC with min A with  observation of shuffling feet, cued for stepping. No dizziness noted in standing or t/f yet with repeated positional change for UBD. OT collectively reviewed optimal position, tech, strategy and B UE precautions for ADLs and related mobility. Pt completed HEP as indicated by MD with focus on BUE ROM at elbow/wrist/hands with improved AROM throughout this date at BUEs, all reps. Pt reported mild increase in pain, transient with HEP. Will progress pt as able while hospitalized, continue to recommend further rehab at d/c when medically ready. (02/16/21 7499)]  ----------------------------------------------------------------------------------

## 2021-02-17 NOTE — THERAPY TREATMENT NOTE
Patient Name: Keely Haider  : 1945    MRN: [id number]                              Today's Date: 2021       Admit Date: 2021    Visit Dx:     ICD-10-CM ICD-9-CM   1. Closed fracture of both humeri, initial encounter  S42.301A 819.0    S42.302A    2. Closed fracture of nasal bone, initial encounter  S02.2XXA 802.0   3. Fall due to slipping on ice or snow, initial encounter  W00.9XXA E885.9     Patient Active Problem List   Diagnosis   • Humerus fracture-bilateral   • Idiopathic scoliosis of thoracolumbar spine   • Left hip pain   • Essential hypertension   • Seizure (CMS/HCC)   • Neck pain     Past Medical History:   Diagnosis Date   • Hypertension    • Osteoarthritis of left hip    • Osteoporosis    • Scoliosis    • Seizures (CMS/HCC)      Past Surgical History:   Procedure Laterality Date   • ABDOMINAL HYSTERECTOMY       General Information     Row Name 21 1315          OT Time and Intention    Document Type  therapy note (daily note)  -JY     Mode of Treatment  occupational therapy;individual therapy  -JY     Row Name 21 1315          General Information    Patient Profile Reviewed  yes  -JY     Existing Precautions/Restrictions  fall;non-weight bearing;left;right;shoulder;other (see comments) B humeral fx (operative LUE, non operative RUE), NWB BUEs, BUEs in donjoy ultra II slings w/o pillows, LUE interscalene, monitor pt O2  -JY     Row Name 21 1315          Cognition    Orientation Status (Cognition)  oriented x 4  -JY     Row Name 21 1315          Safety Issues, Functional Mobility    Safety Issues Affecting Function (Mobility)  safety precaution awareness;safety precautions follow-through/compliance;insight into deficits/self-awareness  -JY     Impairments Affecting Function (Mobility)  balance;strength;coordination;endurance/activity tolerance;pain;range of motion (ROM)  -JY     Comment, Safety Issues/Impairments (Mobility)  pt alert, follows commands; continues to  be more cognizant of deficits with B humeral fxs as well as strategies, tech, etc; did not report feeling LH or dizzy with upright standing or in change of position this date  -J       User Key  (r) = Recorded By, (t) = Taken By, (c) = Cosigned By    Initials Name Provider Type    Citlalli Elmore OT Occupational Therapist          Mobility/ADL's     Row Name 02/17/21 1319          Bed Mobility    Bed Mobility  other (see comments) pt received UIC where pt prefers to sit, sleep with routine change of position due to comfort and support thus bed mobility not assessed this date  -JY     Scooting/Bridging [place] (Bed Mobility)  not tested  -JY     Supine-Sit [place] (Bed Mobility)  not tested  -JY     Bed Mobility, Safety Issues  decreased use of arms for pushing/pulling;impaired trunk control for bed mobility  -J     Comment (Bed Mobility)  pt received UIC where pt prefers to sit, sleep with routine change of position due to comfort and support thus bed mobility not assessed this date; pt is compliant with NWB BUEs in STS t/f from recliner  -     Row Name 02/17/21 1319          Transfers    Transfers  sit-stand transfer  -J     Comment (Transfers)  pt demonstrated improved seq past initial cue to scoot self forward to front of recliner using core & LE strength to stand and adhere to NWB BUEs; pt progressed from CGA to SBA; pt denied any dizziness or feeling LH with change in position  -     Sit-Stand [place] (Transfers)  contact guard;standby assist;verbal cues  -     [place] Level (Toilet Transfer)  not tested  -     Row Name 02/17/21 1319          Sit-Stand Transfer    Assistive Device (Sit-Stand Transfers)  other (see comments) no AD, gait belt donned  -     Row Name 02/17/21 1319          Functional Mobility    Functional Mobility- Comment  defer to PT for specifics  -     Row Name 02/17/21 1319          Activities of Daily Living    BADL Assessment/Intervention  bathing;upper  "body dressing;feeding;grooming  -JY     Row Name 02/17/21 1319          Mobility    Extremity Weight-bearing Status  left upper extremity;right upper extremity  -JY     Left Upper Extremity (Weight-bearing Status)  (S) non weight-bearing (NWB)  -JY     Right Upper Extremity (Weight-bearing Status)  (S) non weight-bearing (NWB)  -JY     Row Name 02/17/21 1319          Bathing Assessment/Intervention    [place] Level (Bathing)  bathing skills;upper body;other (see comments);maximum assist (25% patient effort);verbal cues B axilla care  -JY     Assistive Devices (Bathing)  other (see comments) harish tech with further restrictions given B humeral fx  -JY     Position (Bathing)  unsupported sitting  -JY     Comment (Bathing)  son present for portion of session, however out of respect/privacy present yet not involved in bathing A; strongly emphasized close attention to optimal position (seated pendulum), general harish tech with limited use of opposite UE (w/ NWB) to complete \"flossing\" for washing in order to direct care at next level of rehab/care when UE restrictions limit pt participation; pt will grossly require max A given BUE impact d/t limited reach and limited use of opposite UE; pt does present with more ability to use RUE for elbow flex/ext to wash L side; reviewed no showering while L UE nerve cath still in place  -JY     Row Name 02/17/21 1319          Upper Body Dressing Assessment/Training    [place] Level (Upper Body Dressing)  don;doff;pajama/robe;other (see comments);maximum assist (25% patient effort);dependent (less than 25% patient effort);verbal cues sling  -JY     Assistive Devices (Upper Body Dressing)  other (see comments) harish tech with further restrictions given BUE humeral fx  -JY     Position (Upper Body Dressing)  supported sitting;unsupported sitting  -JY     Comment (Upper Body Dressing)  so present for portion of session however out of respect/privacy for pt, present yet not " actively involved in UBD training except regarding optimal cloth garments to wear; strongly emphasized to pt the importance of being able to direct and lead care being provided to her at next level of care given reality of increased A req'd for B humeral fx, pt able to assist only minimally in unadhering neck strap with limited elbow flex/ext; dep for gross sling mgmt d/d d/t immobilization and BUE precautions; re iterated the optimal sequencing in order for threading/unthreading and mgmt around nerve cath at E; only very intermittent (min A) with RUE to help manage L side of garments with grasp and elbow flex/ext  -JY     Row Name 02/17/21 1319          Self-Feeding Assessment/Training    [place] Level (Feeding)  liquids to mouth;standby assist;set up  -JY     Assistive Devices (Feeding)  adapted cup;scoop dish/plate guard  -JY     Position (Self-Feeding)  supported sitting  -JY     Comment (Feeding)  pt reported significant improvement in self feeding using adaptive cup with lid positioned appropriately and longest available straw and handle and further the scoop plate with guard with noted increased access when food at midline and in close proximity; further reiterated use of dycem once d/c'd to increase stability of tabletop options  -JY     Row Name 02/17/21 1319          Grooming Assessment/Training    Assistive Devices (Grooming)  long handle comb/brush  -JY     Comment (Grooming)  did not assess pt g/h however verbally recommended potential use of LH comb/brush to comb hair with extended length would allow flex/ext at elbow with neutral adducted shoulder to comb/brush  -JY       User Key  (r) = Recorded By, (t) = Taken By, (c) = Cosigned By    Initials Name Provider Type    Citlalli Elmore OT Occupational Therapist        Obj/Interventions     Row Name 02/17/21 1332          Sensory Assessment (Somatosensory)    Sensory Assessment (Somatosensory)  left UE  -JY     Left UE Sensory Assessment   general sensation;light touch awareness;light touch localization;intact;other (see comments) improved sensation (no numbness or tingling noted at L hand, digits), continue to report decreased sensation at L UE with proximal > distal  -JY     Row Name 02/17/21 1334          Sensory Interventions    Comment, Sensory Intervention  improved sensation (no numbness or tingling noted at L hand, digits), continue to report decreased sensation at L UE with proximal > distal  -JY     Row Name 02/17/21 1334          Elbow/Forearm (Therapeutic Exercise)    Elbow/Forearm (Therapeutic Exercise)  AROM (active range of motion)  -JY     Elbow/Forearm AROM (Therapeutic Exercise)  bilateral;flexion;extension;supination;pronation;sitting;10 repetitions  -JY     Row Name 02/17/21 1334          Wrist (Therapeutic Exercise)    Wrist (Therapeutic Exercise)  AROM (active range of motion)  -JY     Wrist AROM (Therapeutic Exercise)  bilateral;flexion;extension;10 repetitions  -JY     Row Name 02/17/21 1334          Hand (Therapeutic Exercise)    Hand (Therapeutic Exercise)  AROM (active range of motion)  -J     Hand AROM/AAROM (Therapeutic Exercise)  bilateral;AROM (active range of motion);finger flexion;finger extension;10 repetitions  -JY     Row Name 02/17/21 1334          Balance    Balance Assessment  sitting static balance;sitting dynamic balance;standing static balance;standing dynamic balance  -JY     Static Sitting Balance  WFL;supported;unsupported;sitting in chair  -JY     Dynamic Sitting Balance  WFL;supported;unsupported;sitting in chair;other (see comments) ADLs, HEP  -JY     Static Standing Balance  WFL;supported;standing  -JY     Dynamic Standing Balance  WFL;supported;standing;other (see comments) fxl t/f  -JY     Balance Interventions  sitting;standing;static;dynamic;sit to stand;supported;occupation based/functional task  -JY     Comment, Balance  pt did not demonstrate any LOB in STS t/f x 3 at recliner, defer to PT for  more dynamic fxl mobility balance assessment  -J     Row Name 02/17/21 0604          Therapeutic Exercise    Therapeutic Exercise  elbow/forearm;wrist;hand;other (see comments) OT re reviewed HEP with focus on BUE ROM at elbow/wrist/hands, bilaterally; pt able to complete all TE with demonstrated AROM, reviewed frequency and optimal position  -JY       User Key  (r) = Recorded By, (t) = Taken By, (c) = Cosigned By    Initials Name Provider Type    Citlalli Elmore OT Occupational Therapist        Goals/Plan    No documentation.       Clinical Impression     Row Name 02/17/21 1330          Pain Assessment    Additional Documentation  Pain Scale: Numbers Pre/Post-Treatment (Group)  -JY     Row Name 02/17/21 7510          Pain Scale: Numbers Pre/Post-Treatment    Pretreatment Pain Rating  7/10  -JY     Posttreatment Pain Rating  6/10  -JY     Pain Location - Side  Left  -JY     Pain Location - Orientation  incisional  -JY     Pain Location  shoulder  -JY     Pre/Posttreatment Pain Comment  pt reported improved pain relief with doffing of sling and completing ex; educated pt to not have pillow support under BUEs that facilitate shoulder elevation of which was observed on arrival  -JY     Pain Intervention(s)  Cold applied;Repositioned;Ambulation/increased activity;Other (Comment) therapeutic exercsie  -J     Row Name 02/17/21 7917          Plan of Care Review    Plan of Care Reviewed With  patient;son;other (see comments) son present for partial session  -JY     Progress  improving  -JY     Outcome Summary  Pt alert, O x 4, agreeable to OT tx. Pt continues to prefer to sleep in recliner for comfort and positioning of UEs thus bed mobility not assessed this date. Pt reported perceived gains, improvement in self feeding with issued adaptive cup w/ handle and longest available straw and scoop plate with guard, did not assess pt self feeding thouhg. Pt completed seated UBD of garments and sling with gross dependency  intermittent max A w/ ability to unadhere neck strap from loops (u/a to remove from neck and adhere to precautions) and use available elbow flex/ext and hand grasp to minimally pull garment down on opposite side. Pt completed B axilla care with gross max A limited by reach with restricted ROM [place] at UEs. Pt grossly reaches anterior area and requires max A for posterior. Pt completed STS t/f x 3 reps with progression from CGA to SBA with inital cue to scoot forward to front of chair and use core/LE strength to ascend and use posterior tactile sense to ensure closeness to seated surface prior to sitting, pt adhered to NWB BUEs throughout. Pt did not note any dizziness or feeling LH with change in position to standing or to pendulum for ADLs . OT collectively reviewed optimal position, tech, strategy and BUE precautions for ADLs and related mobility. Pt completed HEP as indicated by MD with focus on BUE ROM at elbow/wrist/hands with improved  AROM throughout this date at BUEs, all reps. Will progress pt as able while hospitalized, continue to recommend further rehab at d/c when medically ready.  -JY     Row Name 02/17/21 1338          Therapy Plan Review/Discharge Plan (OT)    Anticipated Discharge Disposition (OT)  inpatient rehabilitation facility  -JY     Row Name 02/17/21 1338          Vital Signs    Pre Systolic BP Rehab  172  -JY     Pre Treatment Diastolic BP  66  -JY     Pretreatment Heart Rate (beats/min)  80  -JY     Posttreatment Heart Rate (beats/min)  85  -JY     Pre SpO2 (%)  95  -JY     O2 Delivery Pre Treatment  room air  -JY     Intra SpO2 (%)  91  -JY     O2 Delivery Intra Treatment  room air  -JY     Post SpO2 (%)  94  -JY     O2 Delivery Post Treatment  room air  -JY     Pre Patient Position  Sitting  -JY     Intra Patient Position  Standing  -JY     Post Patient Position  Sitting  -JY     Row Name 02/17/21 1338          Positioning and Restraints    Pre-Treatment Position  sitting in chair/recliner   -JY     Post Treatment Position  chair  -JY     In Chair  notified nsg;reclined;call light within reach;encouraged to call for assist;exit alarm on;with family/caregiver;waffle cushion;with brace;legs elevated BUEs in sling, L UE interscalene intact, def SCDs thus educated on ankle pumps; ice pack to L shoulder  -JY       User Key  (r) = Recorded By, (t) = Taken By, (c) = Cosigned By    Initials Name Provider Type    Citlalli Elmore OT Occupational Therapist        Outcome Measures     Row Name 02/17/21 1351          How much help from another is currently needed...    Putting on and taking off regular lower body clothing?  1  -JY     Bathing (including washing, rinsing, and drying)  2  -JY     Toileting (which includes using toilet bed pan or urinal)  1  -JY     Putting on and taking off regular upper body clothing  2  -JY     Taking care of personal grooming (such as brushing teeth)  2  -JY     Eating meals  2  -JY     AM-PAC 6 Clicks Score (OT)  10  -JY     Row Name 02/17/21 1351          Functional Assessment    Outcome Measure Options  AM-PAC 6 Clicks Daily Activity (OT)  -JY       User Key  (r) = Recorded By, (t) = Taken By, (c) = Cosigned By    Initials Name Provider Type    Citlalli Elmore OT Occupational Therapist        Occupational Therapy Education                 Title: PT OT SLP Therapies (In Progress)     Topic: Occupational Therapy (In Progress)     Point: ADL training (In Progress)     Description:   Instruct learner(s) on proper safety adaptation and remediation techniques during self care or transfers.   Instruct in proper use of assistive devices.              Learning Progress Summary           Patient Acceptance, E,D, NR by SOLOMON at 2/17/2021 1059    Acceptance, E,D, NR by SOLOMON at 2/16/2021 1004    Acceptance, E,D, NR by SOLOMON at 2/15/2021 0815    Acceptance, TB,E,D,H, VU,DU,NR by  at 2/14/2021 1551   Family Acceptance, E,D, NR by SOLOMON at 2/17/2021 1059                   Point: Home exercise  program (In Progress)     Description:   Instruct learner(s) on appropriate technique for monitoring, assisting and/or progressing therapeutic exercises/activities.              Learning Progress Summary           Patient Acceptance, E,D, NR by SOLOMON at 2/17/2021 1059    Acceptance, E,D, NR by SOLOMON at 2/16/2021 1004    Acceptance, E,D, NR by SOLOMON at 2/15/2021 0815    Acceptance, TB,E,D,H, VU,DU,NR by  at 2/14/2021 1551   Family Acceptance, E,D, NR by SOLOMON at 2/17/2021 1059                   Point: Precautions (In Progress)     Description:   Instruct learner(s) on prescribed precautions during self-care and functional transfers.              Learning Progress Summary           Patient Acceptance, E,D, NR by SOLOMON at 2/17/2021 1059    Acceptance, E,D, NR by SOLOMON at 2/16/2021 1004    Acceptance, E,D, NR by SOLOMON at 2/15/2021 0815    Acceptance, TB,E,D,H, VU,DU,NR by  at 2/14/2021 1551   Family Acceptance, E,D, NR by SOLOMON at 2/17/2021 1059                   Point: Body mechanics (In Progress)     Description:   Instruct learner(s) on proper positioning and spine alignment during self-care, functional mobility activities and/or exercises.              Learning Progress Summary           Patient Acceptance, E,D, NR by SOLOMON at 2/17/2021 1059    Acceptance, E,D, NR by SOLOMON at 2/16/2021 1004    Acceptance, E,D, NR by SOLOMON at 2/15/2021 0815    Acceptance, TB,E,D,H, VU,DU,NR by  at 2/14/2021 1551   Family Acceptance, E,D, NR by SOLOMON at 2/17/2021 1059                               User Key     Initials Effective Dates Name Provider Type Discipline     03/07/18 -  Fina Cooper, OT Occupational Therapist OT    J 01/29/20 -  Citlalli Elena OT Occupational Therapist OT              OT Recommendation and Plan     Plan of Care Review  Plan of Care Reviewed With: patient, son, other (see comments)(son present for partial session)  Progress: improving  Outcome Summary: Pt alert, O x 4, agreeable to OT tx. Pt continues to prefer to sleep in recliner  for comfort and positioning of UEs thus bed mobility not assessed this date. Pt reported perceived gains, improvement in self feeding with issued adaptive cup w/ handle and longest available straw and scoop plate with guard, did not assess pt self feeding thouhg. Pt completed seated UBD of garments and sling with gross dependency intermittent max A w/ ability to unadhere neck strap from loops (u/a to remove from neck and adhere to precautions) and use available elbow flex/ext and hand grasp to minimally pull garment down on opposite side. Pt completed B axilla care with gross max A limited by reach with restricted ROM [place] at UEs. Pt grossly reaches anterior area and requires max A for posterior. Pt completed STS t/f x 3 reps with progression from CGA to SBA with inital cue to scoot forward to front of chair and use core/LE strength to ascend and use posterior tactile sense to ensure closeness to seated surface prior to sitting, pt adhered to NWB BUEs throughout. Pt did not note any dizziness or feeling LH with change in position to standing or to pendulum for ADLs . OT collectively reviewed optimal position, tech, strategy and BUE precautions for ADLs and related mobility. Pt completed HEP as indicated by MD with focus on BUE ROM at elbow/wrist/hands with improved  AROM throughout this date at BUEs, all reps. Will progress pt as able while hospitalized, continue to recommend further rehab at d/c when medically ready.     Time Calculation:   Time Calculation- OT     Row Name 02/17/21 1352             Time Calculation- OT    OT Start Time  1059  -JY      OT Received On  02/17/21  -JY      OT Goal Re-Cert Due Date  02/24/21  -JY         Timed Charges    23915 - OT Therapeutic Exercise Minutes  25  -JY      90010 - OT Therapeutic Activity Minutes  20  -JY      97600 - OT Self Care/Mgmt Minutes  23  -JY        User Key  (r) = Recorded By, (t) = Taken By, (c) = Cosigned By    Initials Name Provider Type    SOLOMON Elena  Citlalli, OT Occupational Therapist        Therapy Charges for Today     Code Description Service Date Service Provider Modifiers Qty    00570148450 HC OT SELF CARE/MGMT/TRAIN EA 15 MIN 2/16/2021 Citlalli Elena OT GO 2    83285162770 HC OT THERAPEUTIC ACT EA 15 MIN 2/16/2021 Citlalli Elena OT GO 1    10628836566 HC OT THER PROC EA 15 MIN 2/16/2021 Citlalli Elena OT GO 2    96529453726 HC OT SELF CARE/MGMT/TRAIN EA 15 MIN 2/17/2021 Citlalli Elena OT GO 2    77373165418 HC OT THER PROC EA 15 MIN 2/17/2021 Citlalli Elena OT GO 2    64461464297 HC OT THERAPEUTIC ACT EA 15 MIN 2/17/2021 Citlalli Elena OT GO 1               Citlalli Elena OT  2/17/2021

## 2021-02-17 NOTE — PROGRESS NOTES
[facility] Medicine Services  PROGRESS NOTE    Patient Name: Keely Haider  : 1945  MRN: [id number]    Date of Admission: 2021  Primary Care Physician: Provider, No Known    Subjective   Subjective   CC:  F/U bilateral humerus fractures     HPI:  Patient seen resting up in chair awake and alert.  No acute distress.  Son at bedside.  She states she feels a little better today.  Tolerating diet and meds.  Pain is well controlled with current pain regimen and pain/nerve cath in place to left upper extremity.  Wearing her bilateral upper extremity slings.  Reports good sensation.  Eager to get to rehab soon so that she can ultimately get home to her demented .  No new issues.      ROS:  Gen- No fevers, chills  CV- No chest pain, palpitations  Resp- No cough, dyspnea  GI- No N/V/D, abd pain  MS- +bilateral arm pain (stable)    All other systems have been reviewed and the pertinent positives and negatives are listed above in the HPI or ROS    Objective   Objective   Vital Signs:   Temp:  [97.8 °F (36.6 °C)-98.4 °F (36.9 °C)] 98.4 °F (36.9 °C)  Heart Rate:  [76-96] 76  Resp:  [16-17] 16  BP: (133-176)/(53-66) 176/66      Physical Exam:  Constitutional: Alert, no acute distress.  Elderly female sitting up in chair with son at bedside.  Eyes: sclerae anicteric, no conjunctival injection  Head: NCAT  ENT: [place], moist mucous membranes   Respiratory: Nonlabored, symmetrical chest expansion, CTAB on RA.  Cardiovascular: RRR, no M/R/G, +DP pulses bilaterally  Gastrointestinal: Soft, NT, ND +BS  Musculoskeletal: ECKERT spontaneously with arms in slings bilaterally; ice pack to left shoulder.  Good sensation and movement of bilateral fingers and hands.  No lower extremity edema.  Neurologic: Oriented x 3, strength symmetric in all extremities but decreased upper extremities due to fractures, follows all commands, speech clear and appropriate.  Skin: No rashes on exposed skin; Right  bruised eye and cheek; lateral right eye lid scab; scab on bridge of nose (stable and healing).  Left upper extremity shoulder incision with dressing in place C/D/I.  Mild ecchymosis slowly resolving.  Psychiatric: Pleasant and cooperative; normal affect    Results Reviewed:  Results from last 7 days   Lab Units 02/17/21  0420 02/15/21  0736 02/12/21 2036   WBC 10*3/mm3 7.92 9.44 15.84*   HEMOGLOBIN g/dL 8.8* 8.8* 12.1   HEMATOCRIT % 26.5* 27.4* 35.5   PLATELETS 10*3/mm3 264 200 248     Results from last 7 days   Lab Units 02/17/21  0420 02/15/21  0736 02/12/21 2036   SODIUM mmol/L 135* 132* 133*   POTASSIUM mmol/L 4.0 3.9 4.2   CHLORIDE mmol/L 102 101 101   CO2 mmol/L 26.0 25.0 23.0   BUN mg/dL 8 7* 11   CREATININE mg/dL 0.38* 0.40* 0.50*   GLUCOSE mg/dL 99 115* 118*   CALCIUM mg/dL 8.9 8.8 9.2   ALT (SGPT) U/L  --   --  17   AST (SGOT) U/L  --   --  26     Estimated Creatinine Clearance: 51.9 mL/min (A) (by C-G formula based on SCr of 0.38 mg/dL (L)).    Microbiology Results Abnormal     Procedure Component Value - Date/Time    Urine Culture - Urine, Urine, Clean Catch [[id number]]  (Abnormal)  (Susceptibility) Collected: 02/13/21 1719    Lab Status: Final result Specimen: Urine, Clean Catch Updated: 02/15/21 0902     Urine Culture >100,000 CFU/mL Klebsiella pneumoniae ssp pneumoniae    Susceptibility      Klebsiella pneumoniae ssp pneumoniae     GEOVANNY     Ampicillin Resistant     Ampicillin + Sulbactam Susceptible     Cefazolin Susceptible     Cefepime Susceptible     Ceftazidime Susceptible     Ceftriaxone Susceptible     Gentamicin Susceptible     Levofloxacin Susceptible     Nitrofurantoin Susceptible     Piperacillin + Tazobactam Susceptible     Tetracycline Susceptible     Trimethoprim + Sulfamethoxazole Susceptible                    COVID PRE-OP / PRE-PROCEDURE SCREENING ORDER (NO ISOLATION) - Swab, Nasopharynx [[id number]]  (Normal) Collected: 02/12/21 4678    Lab Status: Final result Specimen: Swab from  Nasopharynx Updated: 02/13/21 0404    Narrative:      The following orders were created for panel order COVID PRE-OP / PRE-PROCEDURE SCREENING ORDER (NO ISOLATION) - Swab, Nasopharynx.  Procedure                               Abnormality         Status                     ---------                               -----------         ------                     COVID-19 and FLU A/B PCR...[[id number]]  Normal              Final result                 Please view results for these tests on the individual orders.    COVID-19 and FLU A/B PCR - Swab, Nasopharynx [[id number]]  (Normal) Collected: 02/12/21 6424    Lab Status: Final result Specimen: Swab from Nasopharynx Updated: 02/13/21 0404     COVID19 Not Detected     Influenza A PCR Not Detected     Influenza B PCR Not Detected    Narrative:      Fact sheet for providers: https://www.fda.gov/media/343255/download    Fact sheet for patients: https://www.fda.gov/media/319682/download    Test performed by PCR.          Imaging Results (Last 24 Hours)     ** No results found for the last 24 hours. **        I have reviewed the medications:  Scheduled Meds:amLODIPine, 10 mg, Oral, Daily  aspirin, 81 mg, Oral, Daily  atorvastatin, 20 mg, Oral, Daily  calcium carb-cholecalciferol, 1 tablet, Oral, Daily  citalopram, 10 mg, Oral, Daily  gabapentin, 300 mg, Oral, Daily With Breakfast  levETIRAcetam, 1,000 mg, Oral, Nightly  levETIRAcetam, 1,500 mg, Oral, QAM  losartan-HCTZ (HYZAAR) 50-12.5 combo dose, , Oral, Daily  polyethylene glycol, 17 g, Oral, Daily      Continuous Infusions:Ropivacine HCl-NaCl, 6 mL/hr, Last Rate: 6 mL/hr (02/15/21 2020)  sodium chloride, 50 mL/hr, Last Rate: 50 mL/hr (02/14/21 1210)      PRN Meds:.•  acetaminophen **OR** acetaminophen  •  bisacodyl  •  HYDROcodone-acetaminophen  •  HYDROcodone-acetaminophen  •  HYDROmorphone **AND** naloxone  •  magnesium citrate  •  ondansetron **OR** ondansetron  •  senna-docusate sodium    Assessment/Plan   Assessment & Plan      Active Hospital Problems    Diagnosis  POA   • Humerus fracture-bilateral [S42.309A]  Yes   • Idiopathic scoliosis of thoracolumbar spine [M41.25]  Yes   • Left hip pain [M25.552]  Yes   • Essential hypertension [I10]  Yes   • Seizure (CMS/HCC) [R56.9]  Yes   • Neck pain [M54.2]  Yes      Resolved Hospital Problems   No resolved problems to display.     Brief Hospital Course to date:  Keely Haider is a 75 y.o. female with past medical history only significant for hypertension, osteoarthritis osteoporosis, scoliosis, chronic left hip pain as well as a seizure disorder who had a fall on the ice and broke both of her humeri.    This patient's problems and plans were partially entered by my partner and updated as appropriate by me 02/17/21.      Assessment/plan:  Patient is new to me today    Bilateral humeral fracture  -Dr. Loza following, s/p ORIF for L proximal humerus Fx   -R humerus Fx to be treated with sling  -Pain control  -PT/OT  -Per ortho note, will remain in the sling until she is seen back in clinic at approx 10 days postop. She can come out of the sling for elbow wrist and hand ROM.  They will start pendulum exercises at  first postop visit.  --Placement  pending.  Case management following.    Postop anemia  --Hemoglobin stable at 8.8.  Continue to monitor.  --Vitals stable    Seizure disorder  -Continue Keppra  --Stable, no seizure activity noted.     Hypertension-improved  -BP elevated yesterday  -Continue Norvasc, Lisinopril   --Stable    Hyponatremia-mild  --Na improved at 135 today.      UTI  -Urine Cx +Klebsiella pneumoniae  -Continue Rocephin 1g IV daily x3d; finished course    Chronic osteoarthritis and osteoporosis       DVT prophylaxis: Mechanical due to surgery (on aspirin 81 mg daily only at this time)    Disposition: I expect the patient to be discharged TBD placement.  Patient pending insurance precertification to [facility] the [facility].    Note… [place]  Would like to establish with a [facility] primary care if we can arrange appointment prior to discharge.  Her son would like to establish care with his PCP.  Will need to get information and schedule prior to discharge    CODE STATUS:   Code Status and Medical Interventions:   Ordered at: 02/12/21 2159     Code Status:    CPR     Medical Interventions (Level of Support Prior to Arrest):    Full     Surekha Melgar, APRN  02/17/21

## 2021-02-17 NOTE — THERAPY TREATMENT NOTE
Patient Name: Keely Haider  : 1945    MRN: [id number]                              Today's Date: 2021       Admit Date: 2021    Visit Dx:     ICD-10-CM ICD-9-CM   1. Closed fracture of both humeri, initial encounter  S42.301A 819.0    S42.302A    2. Closed fracture of nasal bone, initial encounter  S02.2XXA 802.0   3. Fall due to slipping on ice or snow, initial encounter  W00.9XXA E885.9     Patient Active Problem List   Diagnosis   • Humerus fracture-bilateral   • Idiopathic scoliosis of thoracolumbar spine   • Left hip pain   • Essential hypertension   • Seizure (CMS/HCC)   • Neck pain     Past Medical History:   Diagnosis Date   • Hypertension    • Osteoarthritis of left hip    • Osteoporosis    • Scoliosis    • Seizures (CMS/HCC)      Past Surgical History:   Procedure Laterality Date   • ABDOMINAL HYSTERECTOMY       General Information     Row Name 21 1449          Physical Therapy Time and Intention    Document Type  therapy note (daily note)  -SC     Mode of Treatment  physical therapy  -SC     Row Name 21 1449          General Information    Patient Profile Reviewed  yes  -SC     Existing Precautions/Restrictions  fall;non-weight bearing;left;right;shoulder;other (see comments) B UE NWB, nerve cath  -SC     Row Name 21 1449          Cognition    Orientation Status (Cognition)  oriented x 4  -SC     Row Name 21 1446          Safety Issues, Functional Mobility    Impairments Affecting Function (Mobility)  balance;strength;coordination;endurance/activity tolerance;pain;range of motion (ROM)  -SC     Comment, Safety Issues/Impairments (Mobility)  alert, following commands  -SC       User Key  (r) = Recorded By, (t) = Taken By, (c) = Cosigned By    Initials Name Provider Type    SC Juli Avery PT Physical Therapist        Mobility     Row Name 21 1455          Bed Mobility    Comment (Bed Mobility)  UIC  -SC     Row Name 21 1454          Transfers     Comment (Transfers)  STS from recliner with good control.  Also able to scoot forward independently but min assist to scoot backwards into chair  -[facility] Name 02/17/21 1450          Sit-Stand Transfer    Sit-Stand [place] (Transfers)  verbal cues;standby assist  -SC     Assistive Device (Sit-Stand Transfers)  -- no Ad, Gt belt doned  -[facility] Name 02/17/21 1450          Gait/Stairs (Locomotion)    [place] Level (Gait)  verbal cues;contact guard;2 person assist  -SC     Distance in Feet (Gait)  140  -SC     Deviations/Abnormal Patterns (Gait)  bilateral deviations;shalonda decreased;gait speed decreased;stride length decreased;base of support, narrow  -SC     Bilateral Gait Deviations  heel strike decreased  -SC     Comment (Gait/Stairs)  Gt training focued on looking up and taking longer steps. Also required cues for focus on breathing. NO loss of balance . Took one standing rest break . No c/o SOA  -[facility] Name 02/17/21 1450          Mobility    Extremity Weight-bearing Status  left upper extremity;right upper extremity  -SC     Left Upper Extremity (Weight-bearing Status)  non weight-bearing (NWB)  -SC     Right Upper Extremity (Weight-bearing Status)  non weight-bearing (NWB)  -SC       User Key  (r) = Recorded By, (t) = Taken By, (c) = Cosigned By    Initials Name Provider Type    SC Juli Avery, PT Physical Therapist        Obj/Interventions     [facility] Name 02/17/21 1453          Motor Skills    Therapeutic Exercise  -- spirometer x10  -[facility] Name 02/17/21 1453          Balance    Balance Assessment  standing dynamic balance  -SC     Dynamic Standing Balance  mild impairment;supported  -SC     Balance Interventions  dynamic;minimal challenge  -SC     Comment, Balance  requires contact support  -SC       User Key  (r) = Recorded By, (t) = Taken By, (c) = Cosigned By    Initials Name Provider Type    SC Juli Avery, PT Physical Therapist        Goals/Plan    No documentation.        Clinical Impression     Row Name 02/17/21 1503          Pain    Additional Documentation  Pain Scale: FACES Pre/Post-Treatment (Group)  -SC     Row Name 02/17/21 1503          Pain Scale: Numbers Pre/Post-Treatment    Pain Location - Side  Bilateral  -SC     Pain Location - Orientation  upper  -SC     Pain Location  extremity  -SC     Pain Intervention(s)  Repositioned  -SC     Row Name 02/17/21 1503          Pain Scale: FACES Pre/Post-Treatment    Pain: FACES Scale, Pretreatment  2-->hurts little bit  -SC     Posttreatment Pain Rating  2-->hurts little bit  -SC     Row Name 02/17/21 1503          Plan of Care Review    Plan of Care Reviewed With  patient  -SC     Progress  improving  -SC     Outcome Summary  Patient able to ambulate 140 feet with contact guard. SHe required contact suport for safety. Her gait is slightly unsteady and cautious. Her SATs were stable at 98% RA.  -[facility] 02/17/21 1503          Therapy Assessment/Plan (PT)    Patient/Family Therapy Goals Statement (PT)  --  -SC     Rehab Potential (PT)  good, to achieve stated therapy goals  -SC     Criteria for Skilled Interventions Met (PT)  yes;meets criteria;skilled treatment is necessary  -SC     Row Name 02/17/21 1503          Vital Signs    Post SpO2 (%)  98  -SC     O2 Delivery Post Treatment  room air  -[facility] 02/17/21 1503          Positioning and Restraints    Pre-Treatment Position  sitting in chair/recliner  -SC     Post Treatment Position  chair  -SC     In Chair  notified nsg;reclined;call light within reach;encouraged to call for assist;exit alarm on;with family/caregiver  -SC       User Key  (r) = Recorded By, (t) = Taken By, (c) = Cosigned By    Initials Name Provider Type    SC Juli Avery, PT Physical Therapist        Outcome Measures     [facility] Name 02/17/21 1504          How much help from another person do you currently need...    Turning from your back to your side while in flat bed without using bedrails?   2  -SC     Moving from lying on back to sitting on the side of a flat bed without bedrails?  2  -SC     Moving to and from a bed to a chair (including a wheelchair)?  3  -SC     Standing up from a chair using your arms (e.g., wheelchair, bedside chair)?  3  -SC     Climbing 3-5 steps with a railing?  2  -SC     To walk in hospital room?  3  -SC     AM-PAC 6 Clicks Score (PT)  15  -SC     Row Name 02/17/21 1504          Functional Assessment    Outcome Measure Options  AM-PAC 6 Clicks Basic Mobility (PT)  -SC       User Key  (r) = Recorded By, (t) = Taken By, (c) = Cosigned By    Initials Name Provider Type    SC Juli Avery PT Physical Therapist        Physical Therapy Education                 Title: PT OT SLP Therapies (In Progress)     Topic: Physical Therapy (Done)     Point: Mobility training (Done)     Learning Progress Summary           Patient Allisoner, E, VU by SC at 2/17/2021 1505    Comment: reviewed benefits of activity    Acceptance, E,D, VU,NR by LR at 2/16/2021 0914    Comment: Educated on precautions, NWB status, correct sit<->stand t/f technique, correct gait mechanics, safety with mobility, PLB, and progression of POC.    Acceptance, E,TB, VU,NR by AY at 2/15/2021 1105    Acceptance, E,TB, VU,NR by AY at 2/15/2021 1105    Comment: pt educated on weight bearing status, proper technique for transfers, gait mechanics, purlsed lip breathing technique, HEP, and POC progression.                   Point: Home exercise program (Done)     Learning Progress Summary           Patient Eager, E, VU by SC at 2/17/2021 1505    Comment: reviewed benefits of activity    Acceptance, E,D, VU,NR by LR at 2/16/2021 0914    Comment: Educated on precautions, NWB status, correct sit<->stand t/f technique, correct gait mechanics, safety with mobility, PLB, and progression of POC.    Acceptance, E,TB, VU,NR by AY at 2/15/2021 1105    Acceptance, E,TB, VU,NR by AY at 2/15/2021 1105    Comment: pt educated on weight bearing  status, proper technique for transfers, gait mechanics, purlsed lip breathing technique, HEP, and POC progression.                   Point: Body mechanics (Done)     Learning Progress Summary           Patient Eager, E, VU by SC at 2/17/2021 1505    Comment: reviewed benefits of activity    Acceptance, E,D, VU,NR by LR at 2/16/2021 0914    Comment: Educated on precautions, NWB status, correct sit<->stand t/f technique, correct gait mechanics, safety with mobility, PLB, and progression of POC.    Acceptance, E,TB, VU,NR by  at 2/15/2021 1105    Acceptance, E,TB, VU,NR by AY at 2/15/2021 1105    Comment: pt educated on weight bearing status, proper technique for transfers, gait mechanics, purlsed lip breathing technique, HEP, and POC progression.                   Point: Precautions (Done)     Learning Progress Summary           Patient Eager, E, VU by SC at 2/17/2021 1505    Comment: reviewed benefits of activity    Acceptance, E,D, VU,NR by LR at 2/16/2021 0914    Comment: Educated on precautions, NWB status, correct sit<->stand t/f technique, correct gait mechanics, safety with mobility, PLB, and progression of POC.    Acceptance, E,TB, VU,NR by  at 2/15/2021 1105    Acceptance, E,TB, VU,NR by AY at 2/15/2021 1105    Comment: pt educated on weight bearing status, proper technique for transfers, gait mechanics, purlsed lip breathing technique, HEP, and POC progression.                               User Key     Initials Effective Dates Name Provider Type Discipline    SC 06/19/15 -  Juli Avery, PT Physical Therapist PT     06/19/15 -  Jade Maradiaga, PT Physical Therapist PT     11/10/20 -  Rosalinda Elena, PT Physical Therapist PT              PT Recommendation and Plan     Plan of Care Reviewed With: patient  Progress: improving  Outcome Summary: Patient able to ambulate 140 feet with contact guard. SHe required contact suport for safety. Her gait is slightly unsteady and cautious. Her SATs were  stable at 98% RA.     Time Calculation:   PT Charges     Row Name 02/17/21 1406             Time Calculation    Start Time  1406  -SC      PT Received On  02/17/21  -SC      PT Goal Re-Cert Due Date  02/25/21  -SC         Time Calculation- PT    Total Timed Code Minutes- PT  18 minute(s)  -SC         Timed Charges    98783 - PT Therapeutic Exercise Minutes  2  -SC      46669 - Gait Training Minutes   16  -SC        User Key  (r) = Recorded By, (t) = Taken By, (c) = Cosigned By    Initials Name Provider Type    SC Juli Avery, PT Physical Therapist        Therapy Charges for Today     Code Description Service Date Service Provider Modifiers Qty    06681462086 HC GAIT TRAINING EA 15 MIN 2/17/2021 Juli Avery, PT GP 1    74205016428 HC PT THER SUPP EA 15 MIN 2/17/2021 Juli Avery PT GP 2          PT G-Codes  Outcome Measure Options: AM-PAC 6 Clicks Basic Mobility (PT)  AM-PAC 6 Clicks Score (PT): 15  AM-PAC 6 Clicks Score (OT): 10    Juli Avery PT  2/17/2021

## 2021-02-17 NOTE — PROGRESS NOTES
Continued Stay Note  [facility]     Patient Name: Keely Haider  MRN: [id number]  Today's Date: 2/17/2021    Admit Date: 2/12/2021    Discharge Plan     Row Name 02/17/21 1048       Plan    Plan Comments  Per [place] they are still waiting for precert from [place]. Covid test has been ordered. CM will continue to follow.        Discharge Codes    No documentation.       Expected Discharge Date and Time     Expected Discharge Date Expected Discharge Time    Feb 17, 2021             Nae Harvey RN

## 2021-02-18 VITALS
HEART RATE: 83 BPM | OXYGEN SATURATION: 96 % | HEIGHT: 60 IN | SYSTOLIC BLOOD PRESSURE: 168 MMHG | RESPIRATION RATE: 16 BRPM | WEIGHT: 143 LBS | BODY MASS INDEX: 28.07 KG/M2 | DIASTOLIC BLOOD PRESSURE: 60 MMHG | TEMPERATURE: 98.4 F

## 2021-02-18 PROCEDURE — 99239 HOSP IP/OBS DSCHRG MGMT >30: CPT | Performed by: NURSE PRACTITIONER

## 2021-02-18 PROCEDURE — 97110 THERAPEUTIC EXERCISES: CPT

## 2021-02-18 PROCEDURE — 97116 GAIT TRAINING THERAPY: CPT

## 2021-02-18 PROCEDURE — 97535 SELF CARE MNGMENT TRAINING: CPT

## 2021-02-18 PROCEDURE — 97530 THERAPEUTIC ACTIVITIES: CPT

## 2021-02-18 RX ORDER — HYDROCODONE BITARTRATE AND ACETAMINOPHEN 5; 325 MG/1; MG/1
1 TABLET ORAL EVERY 6 HOURS PRN
Qty: 12 TABLET | Refills: 0 | Status: SHIPPED | OUTPATIENT
Start: 2021-02-18 | End: 2021-02-21

## 2021-02-18 RX ORDER — HYDROCODONE BITARTRATE AND ACETAMINOPHEN 5; 325 MG/1; MG/1
1 TABLET ORAL EVERY 6 HOURS PRN
Start: 2021-02-18 | End: 2021-02-18

## 2021-02-18 RX ORDER — ONDANSETRON 4 MG/1
4 TABLET, FILM COATED ORAL EVERY 6 HOURS PRN
Start: 2021-02-18

## 2021-02-18 RX ORDER — ACETAMINOPHEN 325 MG/1
650 TABLET ORAL EVERY 6 HOURS PRN
Start: 2021-02-18

## 2021-02-18 RX ORDER — GABAPENTIN 300 MG/1
300 CAPSULE ORAL
Qty: 3 CAPSULE | Refills: 0 | Status: SHIPPED | OUTPATIENT
Start: 2021-02-18 | End: 2021-02-21

## 2021-02-18 RX ORDER — CITALOPRAM 10 MG/1
10 TABLET ORAL DAILY
Start: 2021-02-19

## 2021-02-18 RX ORDER — AMOXICILLIN 250 MG
2 CAPSULE ORAL 2 TIMES DAILY PRN
Start: 2021-02-18

## 2021-02-18 RX ORDER — BISACODYL 10 MG
10 SUPPOSITORY, RECTAL RECTAL DAILY PRN
Start: 2021-02-18

## 2021-02-18 RX ORDER — GABAPENTIN 300 MG/1
300 CAPSULE ORAL
Qty: 3 CAPSULE | Refills: 0 | Status: SHIPPED | OUTPATIENT
Start: 2021-02-18 | End: 2021-02-18 | Stop reason: SDUPTHER

## 2021-02-18 RX ORDER — POLYETHYLENE GLYCOL 3350 17 G/17G
17 POWDER, FOR SOLUTION ORAL DAILY
Start: 2021-02-19

## 2021-02-18 RX ADMIN — CITALOPRAM HYDROBROMIDE 10 MG: 20 TABLET ORAL at 09:16

## 2021-02-18 RX ADMIN — LEVETIRACETAM 1500 MG: 750 TABLET, FILM COATED ORAL at 09:16

## 2021-02-18 RX ADMIN — HYDROCODONE BITARTRATE AND ACETAMINOPHEN 1 TABLET: 5; 325 TABLET ORAL at 04:03

## 2021-02-18 RX ADMIN — LOSARTAN POTASSIUM: 50 TABLET, FILM COATED ORAL at 09:15

## 2021-02-18 RX ADMIN — ASPIRIN 81 MG: 81 TABLET, CHEWABLE ORAL at 09:15

## 2021-02-18 RX ADMIN — AMLODIPINE BESYLATE 10 MG: 10 TABLET ORAL at 09:16

## 2021-02-18 RX ADMIN — GABAPENTIN 300 MG: 300 CAPSULE ORAL at 09:15

## 2021-02-18 RX ADMIN — ATORVASTATIN CALCIUM 20 MG: 20 TABLET, FILM COATED ORAL at 09:16

## 2021-02-18 RX ADMIN — Medication 1 TABLET: at 09:15

## 2021-02-18 RX ADMIN — POLYETHYLENE GLYCOL 3350 17 G: 17 POWDER, FOR SOLUTION ORAL at 09:15

## 2021-02-18 NOTE — DISCHARGE SUMMARY
[facility]  DISCHARGE SUMMARY    Patient Name: Keely Haider  : 1945  MRN: [id number]    Date of Admission: 2021  Date of Discharge: 2021  Primary Care Physician: Provider, No Known    Consults     Date and Time Order Name Status Description    2021 1214 Inpatient Consult to Hospitalist      2021 0032 Inpatient Orthopedic Surgery Consult Completed         Hospital Course     Presenting Problem:   Humerus fracture [S42.309A]  Humerus fracture [S42.309A]  Humerus fracture [S42.309A]    Active Hospital Problems    Diagnosis  POA   • Humerus fracture-bilateral [S42.309A]  Yes   • Idiopathic scoliosis of thoracolumbar spine [M41.25]  Yes   • Left hip pain [M25.552]  Yes   • Essential hypertension [I10]  Yes   • Seizure (CMS/HCC) [R56.9]  Yes   • Neck pain [M54.2]  Yes      Resolved Hospital Problems   No resolved problems to display.   Hospital Course:  Keely Haider is a 75 y.o. female PMH hypertension, osteoarthritis osteoporosis, scoliosis, chronic left hip pain as well as a seizure disorder who had a fall on the ice and broke both of her humeri.Dr Loza from ortho was consulted and performed an ORIF left proximal humerus with non op right proximal humerus fracture. Surgical dsg will remain in place until follow-up with ortho.     Discharge Follow Up Recommendations for labs/diagnostics:  -F/U with PCP in 1 week after discharge from rehab  -F/U with Dr Loza 10 days post-op, which will be 21.  -Pt can come out of the sling for elbow wrist and hand ROM. Pt will start pendulum exercises at  first postop visit. Surgical dressing remain in place until follow-up    Day of Discharge     HPI:   Pt sitting in recliner, A&OX3, very pleasant and talkative. She reports a BM this morning. States bilateral shoulder pain is 1/10 at rest and 5/10 with movement. Denies N/V.     Review of Systems  Gen- No fevers, chills  CV- No chest pain,  palpitations  Resp- No cough, dyspnea  GI- No N/V/D, abd pain  MS- (+) bilateral shoulder pain  Otherwise ROS is negative except as mentioned in the HPI.    Vital Signs:   Temp:  [98.4 °F (36.9 °C)-99.3 °F (37.4 °C)] 98.4 °F (36.9 °C)  Heart Rate:  [77-90] 83  Resp:  [16] 16  BP: (137-168)/(48-60) 168/60     Physical Exam:  Constitutional: Awake, alert, very pleasant, NAD  Eyes: PERRLA, sclerae anicteric, no conjunctival injection  HENT: NCAT, mucous membranes moist  Neck: Supple, no thyromegaly, no lymphadenopathy, trachea midline  Respiratory: Clear to auscultation bilaterally, nonlabored respirations   Cardiovascular: RRR, no murmurs, rubs, or gallops, palpable pedal pulses bilaterally  Gastrointestinal: Positive bowel sounds, soft, nontender, nondistended  Musculoskeletal: No bilateral ankle edema, no clubbing or cyanosis to extremities. Bilateral arms in slings. Ice pack to left shoulder.   Psychiatric: Appropriate affect, cooperative  Neurologic: Oriented x 3, strength symmetric in all extremities, Cranial Nerves grossly intact to confrontation, speech clear  Skin: No rashes. Bruising to right eye/right cheek and abrasion with scabbing to bridge of nose.     Assessment/Plan:  Bilateral humeral fracture-stable  -Dr. Loza following, s/p ORIF for L proximal humerus Fx   -R humerus Fx to be treated with sling  -Pain control working well.   -PT/OT  -Per ortho note, will remain in the sling until she is seen back in clinic at approx 10 days postop. She can come out of the sling for elbow wrist and hand ROM.  They will start pendulum exercises at  first postop visit. Surgical dsg to remain in place until see by ortho in office at return appointment.     Seizure disorder-stable  -Continue Keppra     Hypertension-stable  -Continue Norvasc, Lisinopril      Hyponatremia-mild-improved  --improving after decreasing celexa to 10 mg from 20 mg. (2/17) Na+ 135    Depression/anxiety-stable  - decreased celexa to 10 mg daily  from 20 mg daily due to hyponatremia.      UTI-resolved  -Urine Cx +Klebsiella pneumoniae  -Continue Rocephin 1g IV daily x3d; finished     Chronic osteoarthritis and osteoporosis- stable    Pertinent  and/or Most Recent Results     Results from last 7 days   Lab Units 02/17/21  0420 02/15/21  0736 02/12/21 2036   WBC 10*3/mm3 7.92 9.44 15.84*   HEMOGLOBIN g/dL 8.8* 8.8* 12.1   HEMATOCRIT % 26.5* 27.4* 35.5   PLATELETS 10*3/mm3 264 200 248   SODIUM mmol/L 135* 132* 133*   POTASSIUM mmol/L 4.0 3.9 4.2   CHLORIDE mmol/L 102 101 101   CO2 mmol/L 26.0 25.0 23.0   BUN mg/dL 8 7* 11   CREATININE mg/dL 0.38* 0.40* 0.50*   GLUCOSE mg/dL 99 115* 118*   CALCIUM mg/dL 8.9 8.8 9.2     Results from last 7 days   Lab Units 02/12/21 2036   BILIRUBIN mg/dL 0.3   ALK PHOS U/L 47   ALT (SGPT) U/L 17   AST (SGOT) U/L 26       Brief Urine Lab Results  (Last result in the past 365 days)      Color   Clarity   Blood   Leuk Est   Nitrite   Protein   CREAT   Urine HCG        02/13/21 1719 Yellow Cloudy Small (1+) Moderate (2+) Positive Negative               Microbiology Results Abnormal     Procedure Component Value - Date/Time    COVID PRE-OP / PRE-PROCEDURE SCREENING ORDER (NO ISOLATION) - Swab, Nasopharynx [[id number]]  (Normal) Collected: 02/17/21 1610    Lab Status: Final result Specimen: Swab from Nasopharynx Updated: 02/17/21 1653    Narrative:      The following orders were created for panel order COVID PRE-OP / PRE-PROCEDURE SCREENING ORDER (NO ISOLATION) - Swab, Nasopharynx.  Procedure                               Abnormality         Status                     ---------                               -----------         ------                     COVID-19,CEPHEID,RADHA IN-...[[id number]]  Normal              Final result                 Please view results for these tests on the individual orders.    COVID-19,CEPHEID,RADHA IN-HOUSE(OR EMERGENT/ADD-ON),NP SWAB IN TRANSPORT MEDIA 3-4 HR TAT - Swab, Nasopharynx [[id number]]  (Normal)  Collected: 02/17/21 1610    Lab Status: Final result Specimen: Swab from Nasopharynx Updated: 02/17/21 1653     COVID19 Not Detected    Narrative:      Fact sheet for providers: https://www.fda.gov/media/443816/download     Fact sheet for patients: https://www.fda.gov/media/172129/download    Urine Culture - Urine, Urine, Clean Catch [[id number]]  (Abnormal)  (Susceptibility) Collected: 02/13/21 1719    Lab Status: Final result Specimen: Urine, Clean Catch Updated: 02/15/21 0902     Urine Culture >100,000 CFU/mL Klebsiella pneumoniae ssp pneumoniae    Susceptibility      Klebsiella pneumoniae ssp pneumoniae     GEOVANNY     Ampicillin Resistant     Ampicillin + Sulbactam Susceptible     Cefazolin Susceptible     Cefepime Susceptible     Ceftazidime Susceptible     Ceftriaxone Susceptible     Gentamicin Susceptible     Levofloxacin Susceptible     Nitrofurantoin Susceptible     Piperacillin + Tazobactam Susceptible     Tetracycline Susceptible     Trimethoprim + Sulfamethoxazole Susceptible                    COVID PRE-OP / PRE-PROCEDURE SCREENING ORDER (NO ISOLATION) - Swab, Nasopharynx [[id number]]  (Normal) Collected: 02/12/21 2352    Lab Status: Final result Specimen: Swab from Nasopharynx Updated: 02/13/21 0404    Narrative:      The following orders were created for panel order COVID PRE-OP / PRE-PROCEDURE SCREENING ORDER (NO ISOLATION) - Swab, Nasopharynx.  Procedure                               Abnormality         Status                     ---------                               -----------         ------                     COVID-19 and FLU A/B PCR...[[id number]]  Normal              Final result                 Please view results for these tests on the individual orders.    COVID-19 and FLU A/B PCR - Swab, Nasopharynx [[id number]]  (Normal) Collected: 02/12/21 2352    Lab Status: Final result Specimen: Swab from Nasopharynx Updated: 02/13/21 0404     COVID19 Not Detected     Influenza A PCR Not Detected      Influenza B PCR Not Detected    Narrative:      Fact sheet for providers: https://www.fda.gov/media/521201/download    Fact sheet for patients: https://www.fda.gov/media/486148/download    Test performed by PCR.          Imaging Results (All)     Procedure Component Value Units Date/Time    FL C Arm During Surgery [[id number]] Collected: 02/14/21 1850     Updated: 02/15/21 2214    Narrative:      EXAMINATION: FL C ARM DURING SURGERY - 02/14/2021      INDICATION: S42.301A-Unspecified fracture of shaft of humerus, right  arm, initial encounter for closed fracture; S42.302A-Unspecified  fracture of shaft of humerus, left arm, initial encounter for closed  fracture; S02.2XXA-Fracture of nasal bones, initial encounter for closed  fracture; W00.9XXA-Unspecified fall due to ice and snow, initial  encounter. Left proximal humerus pain.     COMPARISON: None.     FINDINGS: Dictation is to record 1 minute of fluoroscopy time. Total for  each procedure images obtained show initial pin fixation of the  patient's proximal humerus fracture and subsequent lateral side plate  and multiple screw fixation with the fracture fragments appearing in  close anatomic alignment on the final two images. Please see the  procedure report for full details.       Impression:      Fluoroscopy provided during ORIF of the left proximal  humerus.     DICTATED:   02/14/2021  EDITED/ls :   02/14/2021        This report was finalized on 2/15/2021 10:11 PM by Dr. Eagle Davis MD.       CT Upper Extremity Left Without Contrast [[id number]] Collected: 02/13/21 1358     Updated: 02/13/21 1416    Narrative:      EXAMINATION: CT UPPER EXTREMITY LEFT WO CONTRAST-, CT UPPER EXTREMITY  RIGHT WO CONTRAST-      INDICATION: Fracture, shoulder     TECHNIQUE: Multiplanar CT imaging of the right and left shoulders was  performed. 3-D reconstructed images were performed to facilitate  diagnostic accuracy and preoperative planning.     The radiation dose reduction device  was turned on for each scan per the  ALARA (As Low as Reasonably Achievable) protocol.     COMPARISON: Radiographs of the left humerus and right shoulder performed  1 day prior.     FINDINGS:      Left shoulder:  Comminuted, impacted fracture of the surgical neck of the left humerus.  There is approximately 1.5 cm of override and approximately 1.2 cm of  medial subluxation of the humerus. Fracture line extends through the  greater tuberosity with changes mildly displaced. The articular surface  of the humeral head remains aligned with the glenoid. Large subchondral  cyst is noted. The acromioclavicular joint is maintained. Extensive soft  tissue swelling surrounding the shoulder joint with high density  material within the medullary cavity likely representing blood products.  No other acute findings within the imaged left chest wall.     Right shoulder:      Comminuted, impacted fracture extending through the surgical neck of  the right humerus. Approximately 2 cm of override and 1.3 cm a medial  subluxation of the humerus. Comminuted fracture extending through the  greater tuberosity which is mildly displaced. Acromioclavicular joint is  maintained. Humeral head is markedly externally rotated but remains in  contact with the glenoid. No other fracture of the right chest wall.  Extensive surrounding soft tissue swelling high density material within  the medullary canal consistent with hematoma.             Impression:      1. Comminuted impacted fracture extending through the surgical neck of  the left humerus with medial displacement. Additional mildly displaced  fracture involving the greater tuberosity.     2. Comminuted impacted fracture extending through the surgical neck of  the right humerus with medial displacement. Mildly displaced fracture  involving the greater tuberosity.              This report was finalized on 2/13/2021 2:12 PM by Cristobal Gibbs.       CT Upper Extremity Right Without Contrast  [[id number]] Collected: 02/13/21 1358     Updated: 02/13/21 1416    Narrative:      EXAMINATION: CT UPPER EXTREMITY LEFT WO CONTRAST-, CT UPPER EXTREMITY  RIGHT WO CONTRAST-      INDICATION: Fracture, shoulder     TECHNIQUE: Multiplanar CT imaging of the right and left shoulders was  performed. 3-D reconstructed images were performed to facilitate  diagnostic accuracy and preoperative planning.     The radiation dose reduction device was turned on for each scan per the  ALARA (As Low as Reasonably Achievable) protocol.     COMPARISON: Radiographs of the left humerus and right shoulder performed  1 day prior.     FINDINGS:      Left shoulder:  Comminuted, impacted fracture of the surgical neck of the left humerus.  There is approximately 1.5 cm of override and approximately 1.2 cm of  medial subluxation of the humerus. Fracture line extends through the  greater tuberosity with changes mildly displaced. The articular surface  of the humeral head remains aligned with the glenoid. Large subchondral  cyst is noted. The acromioclavicular joint is maintained. Extensive soft  tissue swelling surrounding the shoulder joint with high density  material within the medullary cavity likely representing blood products.  No other acute findings within the imaged left chest wall.     Right shoulder:      Comminuted, impacted fracture extending through the surgical neck of  the right humerus. Approximately 2 cm of override and 1.3 cm a medial  subluxation of the humerus. Comminuted fracture extending through the  greater tuberosity which is mildly displaced. Acromioclavicular joint is  maintained. Humeral head is markedly externally rotated but remains in  contact with the glenoid. No other fracture of the right chest wall.  Extensive surrounding soft tissue swelling high density material within  the medullary canal consistent with hematoma.             Impression:      1. Comminuted impacted fracture extending through the surgical  neck of  the left humerus with medial displacement. Additional mildly displaced  fracture involving the greater tuberosity.     2. Comminuted impacted fracture extending through the surgical neck of  the right humerus with medial displacement. Mildly displaced fracture  involving the greater tuberosity.              This report was finalized on 2/13/2021 2:12 PM by Cristobal Gibbs.       XR Humerus Left [[id number]] Collected: 02/12/21 1913     Updated: 02/12/21 2209    Narrative:      EXAMINATION: XR LEFT HUMERUS - 02/12/2021     INDICATION: Fall. Right and left shoulder pain.     COMPARISON: None.     FINDINGS: There is a surgical neck fracture of the proximal humerus with  mild impaction, and avulsion of the greater tuberosity. No underlying  pathologic lesion is seen. The bones appear generally osteopenic.  Remaining bony structures appear to be intact.       Impression:      Acute surgical neck fracture of the left proximal humerus as  described.     DICTATED:   02/12/2021  EDITED/ls :   02/12/2021         This report was finalized on 2/12/2021 10:06 PM by Dr. Eagle Davis MD.       XR Shoulder 2+ View Right [[id number]] Collected: 02/12/21 1929     Updated: 02/12/21 1931    Narrative:      CR Shoulder Comp Min 2 Vws RT    INDICATION:   Pain in the shoulder and humerus. Fell down the steps today.    COMPARISON:   None available.    FINDINGS:   3 views of the right shoulder.  The bones are osteoporotic. There is a comminuted impacted fracture that appears to be at the level of the surgical neck of the humerus. Impaction on the order of at least 2 cm. Mild subluxation but no distinct shoulder  dislocation. Degenerative changes of the AC joint and glenohumeral joint with probable undersurface spurring of the acromion versus underlying intra-articular loose body. Bony glenoid appears intact. No foreign body.      Impression:        1. Comminuted and impacted fracture of the proximal humerus and humeral head.    Signer  Name: Huber Lynch MD   Signed: 2/12/2021 7:29 PM   Workstation Name: [facility]    [facility]    CT Cervical Spine Without Contrast [[id number]] Collected: 02/12/21 1928     Updated: 02/12/21 1930    Narrative:      CT Spine Cervical WO    INDICATION:   Neck pain after fall today. Pain across neck and shoulders.    TECHNIQUE:   CT of the cervical spine without IV contrast. Coronal and sagittal reconstructions were obtained.  Radiation dose reduction techniques included automated exposure control or exposure modulation based on body size. Count of known CT and cardiac nuc med  studies performed in previous 12 months: 0.     COMPARISON:  None available.    FINDINGS:  Moderately advanced arthritic changes atlantoaxial joint with erosions. Moderately advanced C5-C6 and C6-7 degenerative disc disease. No acute fracture or malalignment. Mild C5-C6 spinal stenosis and right foraminal stenosis. Mild C6-C7 spinal stenosis  and right greater than left foraminal stenosis. Paravertebral soft tissues unremarkable.      Impression:      No acute traumatic findings. Moderately advanced degenerative disc disease with spinal and foraminal stenosis as detailed.    Signer Name: GAY Bay MD   Signed: 2/12/2021 7:28 PM   Workstation Name: [facility]    [facility]    CT Head Without Contrast [[id number]] Collected: 02/12/21 1925     Updated: 02/12/21 1927    Narrative:      CT Head WO    HISTORY:   Status post fall today. Complains of laceration to nose and above right eye.    TECHNIQUE:   Axial unenhanced head CT. Radiation dose reduction techniques included automated exposure control or exposure modulation based on body size. Count of known CT and cardiac nuc med studies performed in previous 12 months: 0.     Time of scan: 1908 hours    COMPARISON:   None.    FINDINGS:   No intracranial hemorrhage, mass, or infarct. No hydrocephalus or extra-axial fluid collection. There  are senescent changes, including volume loss and nonspecific white matter change, but no acute abnormality is seen. The skull base, calvarium, and  extracranial soft tissues are normal. Small amount of soft tissue gas right nasal soft tissues may reflect laceration.      Impression:      Senescent changes without acute abnormality.    Suspected small right nasal soft tissue laceration.          Signer Name: GAY Bay MD   Signed: 2/12/2021 7:25 PM   Workstation Name: [facility]    [facility]          Discharge Details        Discharge Medications      New Medications      Instructions Start Date   acetaminophen 325 MG tablet  Commonly known as: TYLENOL   650 mg, Oral, Every 6 Hours PRN      bisacodyl 10 MG suppository  Commonly known as: DULCOLAX   10 mg, Rectal, Daily PRN      HYDROcodone-acetaminophen 5-325 MG per tablet  Commonly known as: NORCO   1 tablet, Oral, Every 6 Hours PRN      ondansetron 4 MG tablet  Commonly known as: ZOFRAN   4 mg, Oral, Every 6 Hours PRN      polyethylene glycol 17 g packet  Commonly known as: MIRALAX   17 g, Oral, Daily   Start Date: February 19, 2021     sennosides-docusate 8.6-50 MG per tablet  Commonly known as: PERICOLACE   2 tablets, Oral, 2 Times Daily PRN         Changes to Medications      Instructions Start Date   citalopram 10 MG tablet  Commonly known as: CeleXA  What changed:   · medication strength  · how much to take  · when to take this  · additional instructions   10 mg, Oral, Daily   Start Date: February 19, 2021        Continue These Medications      Instructions Start Date   amLODIPine 10 MG tablet  Commonly known as: NORVASC   10 mg, Oral, Daily      aspirin 81 MG chewable tablet   81 mg, Oral, Daily      Calcium 500-100 MG-UNIT chewable tablet   1 tablet, Oral, 2 times daily      gabapentin 300 MG capsule  Commonly known as: NEURONTIN   300 mg, Oral, Daily With Breakfast      levETIRAcetam 500 MG tablet  Commonly known as:  KEPPRA   1,000 mg, Oral, Daily      levETIRAcetam 500 MG tablet  Commonly known as: KEPPRA   1,500 mg, Oral, Nightly      losartan-hydrochlorothiazide 50-12.5 MG per tablet  Commonly known as: HYZAAR   1 tablet, Oral      methylcellulose (Laxative) 500 MG tablet tablet  Commonly known as: CITRUCEL   1 tablet, Oral, 2 times daily      simvastatin 40 MG tablet  Commonly known as: ZOCOR   40 mg, Oral, Nightly      VITAMIN D2 PO   1.25 mg, Oral, Weekly           Allergies   Allergen Reactions   • Sulfa Antibiotics Unknown - Low Severity     Discharge Disposition:  Rehab Facility or Unit (DC - External)    Discharge Diet:  Diet Order   Procedures   • Diet Regular     Discharge Activity:   Activity Instructions     Activity as Tolerated      Up WIth Assist          CODE STATUS:    Code Status and Medical Interventions:   Ordered at: 02/12/21 2159     Code Status:    CPR     Medical Interventions (Level of Support Prior to Arrest):    Full     Additional Instructions for the Follow-ups that You Need to Schedule     Discharge Follow-up with PCP   As directed       Currently Documented PCP:    Provider, No Known    PCP Phone Number:    None     Follow Up Details: F/U with PCP in 1 week after discharge from rehab         Discharge Follow-up with Specified Provider: Dr Loza   As directed      To: Dr Loza    Follow Up Details: F/U 10 days post-op which will be 2/24             Time Spent on Discharge:  45 minutes    Electronically signed by YOHANA Price, 02/18/21, 11:40 AM EST.

## 2021-02-18 NOTE — PROGRESS NOTES
Orthopedic Daily Progress Note      CC: ORIF L proximal humerus fracture    Pain well controlled  General: no fevers, chills  Abdomen: no nausea, vomiting, or diarrhea    No other complaints    Physical Exam:  I have reviewed the vital signs.  Temp:  [98.4 °F (36.9 °C)-99.3 °F (37.4 °C)] 98.4 °F (36.9 °C)  Heart Rate:  [76-90] 83  Resp:  [16] 16  BP: (137-176)/(48-66) 168/60    Objective  General Appearance:    Alert, cooperative, no distress  Extremities: No clubbing, cyanosis, or edema to lower extremities  Pulses:  2+ in distal surgical extremity  Skin: Dressing Clean/dry/intact      Results Review:    I have reviewed the labs, radiology results and diagnostic studies:    Results from last 7 days   Lab Units 02/17/21  0420   WBC 10*3/mm3 7.92   HEMOGLOBIN g/dL 8.8*   PLATELETS 10*3/mm3 264     Results from last 7 days   Lab Units 02/17/21  0420   SODIUM mmol/L 135*   POTASSIUM mmol/L 4.0   CO2 mmol/L 26.0   CREATININE mg/dL 0.38*   GLUCOSE mg/dL 99       I have reviewed the medications.    Assessment/Problem List  POD#4   S/p ORIF proximal humerus     Plan  1) Slings  2) OT-- limitations as ordered  3) dressing to remain in place until followup  4) po pain meds with ISB block         Discharge Planning: I expect patient to be discharged to Rehab today    Timoteo Loza MD  02/18/21  10:33 EST

## 2021-02-18 NOTE — DISCHARGE INSTRUCTIONS
Plan:  She will remain in the sling until he is seen back in clinic at approx 10 days postop (2/24/21) She can come out of the sling for elbow wrist and hand ROM. She will start pendulum exercises at  first postop visit.

## 2021-02-18 NOTE — PLAN OF CARE
Goal Outcome Evaluation:  Plan of Care Reviewed With: patient  Progress: improving   Pt currently in bed resting quietly. No complaints of pain or discomfort at this time. Ropivicaine at 6ml to LUE. Bilateral slings in place, no breakdown observed. Dressing to LUE CDI. Pt ambulatory in hallway with gait belt. Vitals WNL on room air. Pt did have one episode of panic throughout the night stating she felt like to was trapped in a sinking hole and she couldn't call out to get help. Active listening provided with reassurance. Pt may D/C to [facility] today, weather permitted. No other observations at this time. Will continue to monitor, call bell in reach.

## 2021-02-18 NOTE — PLAN OF CARE
Problem: Adult Inpatient Plan of Care  Goal: Plan of Care Review  Recent Flowsheet Documentation  Taken 2/18/2021 1103 by Jade Maradiaga, PT  Progress: improving  Plan of Care Reviewed With: patient  Outcome Summary: Patient ambulated 150 feet with CGA, limited by fatigue. Patient continues to ambulate with short shuffling steps and is mildly unsteady throughout but no LOB. O2 sats WNL with activity today. Patient has been d/c to SNF today.   Goal Outcome Evaluation:  Plan of Care Reviewed With: patient  Progress: improving  Outcome Summary: Patient ambulated 150 feet with CGA, limited by fatigue. Patient continues to ambulate with short shuffling steps and is mildly unsteady throughout but no LOB. O2 sats WNL with activity today. Patient has been d/c to SNF today.

## 2021-02-18 NOTE — PROGRESS NOTES
[facility]    Acute pain service Inpatient Progress Note    Patient Name: Keely Haider  :  1945  MRN:  [id number]        Acute Pain  Service Inpatient Progress Note:    Analgesia:Good  Pain Score:4/10  LOC: alert and awake  Resp Status: room air  Cardiac: VS stable  Side Effects:None  Catheter Site:clean, dressing intact and dry  Cath type: peripheral nerve cath with ON Q  Infusion rate: 6ml/hr  Catheter Plan:Catheter to remain Insitu and Continue catheter infusion rate unchanged  Comments: ---------------------------------------------------------------------------------  Physical Therapy Manual Muscle Testing Results:   ]    Physical Therapy - Plan of Care Review - Outcome Summary:  Outcome Summary: Patient able to ambulate 140 feet with contact guard. SHe required contact suport for safety. Her gait is slightly unsteady and cautious. Her SATs were stable at 98% RA. (21 1503)]    Occupational Therapy - Plan of Care Review - Outcome Summary:  Outcome Summary: Pt alert, O x 4, agreeable to OT tx. Pt continues to prefer to sleep in recliner for comfort and positioning of UEs thus bed mobility not assessed this date. Pt reported perceived gains, improvement in self feeding with issued adaptive cup w/ handle and longest available straw and scoop plate with guard, did not assess pt self feeding thouhg. Pt completed seated UBD of garments and sling with gross dependency intermittent max A w/ ability to unadhere neck strap from loops (u/a to remove from neck and adhere to precautions) and use available elbow flex/ext and hand grasp to minimally pull garment down on opposite side. Pt completed B axilla care with gross max A limited by reach with restricted ROM Eliu at UEs. Pt grossly reaches anterior area and requires max A for posterior. Pt completed STS t/f x 3 reps with progression from CGA to SBA with inital cue to scoot forward to front of chair and use core/LE strength to ascend and use posterior  tactile sense to ensure closeness to seated surface prior to sitting, pt adhered to NWB BUEs throughout. Pt did not note any dizziness or feeling LH with change in position to standing or to pendulum for ADLs . OT collectively reviewed optimal position, tech, strategy and BUE precautions for ADLs and related mobility. Pt completed HEP as indicated by MD with focus on BUE ROM at elbow/wrist/hands with improved  AROM throughout this date at BUEs, all reps. Will progress pt as able while hospitalized, continue to recommend further rehab at d/c when medically ready. (02/17/21 8574)]  ----------------------------------------------------------------------------------

## 2021-02-18 NOTE — PLAN OF CARE
Problem: Adult Inpatient Plan of Care  Goal: Plan of Care Review  Recent Flowsheet Documentation  Taken 2/18/2021 1255 by Citlalli Elena OT  Progress: improving  Plan of Care Reviewed With: patient  Outcome Summary: PT alert, O x 4 and eager for anticipated d/c this date. Pt received UIC where pt prefers to sit/sleep d//t improved comfort and position of BUEs thus bed mobility not assessed this date. Pt completed seated HEP as indicated by MD with focus on BUE AROM elbow/wrist/hand of which pt was able to tolerate x 10 reps each. No increase but rather decrease in pain noted. Edema noted in L UE where positioning of sling req'd adjustment at OT arrival. OT reviewed frequency, position for HEP to continue at d/c. Pt completed doffing gown with max A, largely dep for redonning personal shirt given more restricted garment and assured adherence to B UE precautions. Pt able to assist minimally in unadhering neck strap at B slings, yet grossly dep for d/d sling. Pt dep for LBD d/t inability to reach distally and further u/a to demo IR, shoulder ext etc to assist with mgmt. OT collectively reviewed optimal position, tech, strategy for ADLs, sling mgmt, HEP and general safety awareness with BUE precautions adhered throughout. Pt stood with SBA and completed integrated stepping, fxl mob with intermittent CGA for some unsteadiness still noted and fact that pt's balance mechanisms and techs are hindered with BUEs immobilized. Pt was challenged to direct care, take lead in verbalized instruction, demo toward OT to simulate direction toward staff at next level of care. Pt demonstrated improved ability to direct care. OT reviewed potential AE that could further benefit pt past hospitalization nick for s/f and g/h. PT verbalized understanding. Will continue to progress pt as able while hospitalized yet anticipate d/c this date.

## 2021-02-18 NOTE — PROGRESS NOTES
Continued Stay Note  [facility]     Patient Name: Keely Haider  MRN: [id number]  Today's Date: 2/18/2021    Admit Date: 2/12/2021    Discharge Plan     Row Name 02/18/21 0909       Plan    Plan Comments  Pt has received insurance approval for transport to [facility] for today 2/18. [facility] transport has been arranged for 1330. She will need to be in front of the [address] building at 1315. Report can be called to [phone].    Final Discharge Disposition Code  03 - skilled nursing facility (SNF)        Discharge Codes    No documentation.       Expected Discharge Date and Time     Expected Discharge Date Expected Discharge Time    Feb 17, 2021             Nae Harvey RN

## 2021-02-18 NOTE — THERAPY TREATMENT NOTE
Patient Name: Keely Haider  : 1945    MRN: [id number]                              Today's Date: 2021       Admit Date: 2021    Visit Dx:     ICD-10-CM ICD-9-CM   1. Closed fracture of both humeri, initial encounter  S42.301A 819.0    S42.302A    2. Closed fracture of nasal bone, initial encounter  S02.2XXA 802.0   3. Fall due to slipping on ice or snow, initial encounter  W00.9XXA E885.9   4. Other closed nondisplaced fracture of proximal end of humerus with routine healing, unspecified laterality, subsequent encounter  S42.296D V54.11     Patient Active Problem List   Diagnosis   • Humerus fracture-bilateral   • Idiopathic scoliosis of thoracolumbar spine   • Left hip pain   • Essential hypertension   • Seizure (CMS/HCC)   • Neck pain     Past Medical History:   Diagnosis Date   • Hypertension    • Osteoarthritis of left hip    • Osteoporosis    • Scoliosis    • Seizures (CMS/HCC)      Past Surgical History:   Procedure Laterality Date   • ABDOMINAL HYSTERECTOMY       General Information     Row Name 21 1137          OT Time and Intention    Document Type  therapy note (daily note)  -JY     Mode of Treatment  occupational therapy;individual therapy  -JY     Row Name 21 1137          General Information    Patient Profile Reviewed  yes  -JY     Existing Precautions/Restrictions  fall;non-weight bearing;left;right;shoulder;other (see comments) CLAYTON STEARNSB, CLAYTON kaufman ultra II sling, interscalene LUE (operative), RUE (non operative), monitor O2, chronic L hip pain, hx of seizure dx  -JY     Row Name 21 1137          Cognition    Orientation Status (Cognition)  oriented x 4  -JY     Row Name 21 1137          Safety Issues, Functional Mobility    Safety Issues Affecting Function (Mobility)  safety precaution awareness;safety precautions follow-through/compliance  -JY     Impairments Affecting Function (Mobility)  balance;strength;coordination;endurance/activity  tolerance;pain;range of motion (ROM)  -JY     Comment, Safety Issues/Impairments (Mobility)  alert, follows commands; demonstrates good compliance with NWB BUEs in ascend/descend and standing  -JY       User Key  (r) = Recorded By, (t) = Taken By, (c) = Cosigned By    Initials Name Provider Type    Citlalli Elmore OT Occupational Therapist          Mobility/ADL's     Row Name 02/18/21 1140          Bed Mobility    Bed Mobility  other (see comments) pt received UIC this date and preferred to remain OOB as more comfortable and more optimal position for UEs thus bed mobility not assessed  -JY     Scooting/Bridging [place] (Bed Mobility)  not tested  -JY     Supine-Sit [place] (Bed Mobility)  not tested  -JY     Comment (Bed Mobility)  pt received UIC this date and preferred to remain OOB as more comfortable and more optimal position for UEs thus bed mobility not assessed; did review need to use core and leg strength to advance self upright and to EOB and may require further A from staff if resume lying in bed at next rehab location  -SOLOMON     Row Name 02/18/21 1140          Transfers    Transfers  sit-stand transfer  -JANGELICA     Comment (Transfers)  good demo of seq to advance self to front of recliner to gain leverage with UB over LB and utilized core/LE strength to stand from chair; reiterated backing up to chair prior to sitting to ensure it is felt (tactile input at posterior LEs, knees) to know in close proximity; increased time to scoot posteriorly with need for min A d/t depth of chair  -JY     Sit-Stand [place] (Transfers)  standby assist;verbal cues  -SOLOMON     [place] Level (Toilet Transfer)  not tested  -SOLOMON     Row Name 02/18/21 1140          Sit-Stand Transfer    Assistive Device (Sit-Stand Transfers)  other (see comments) no AD, gait belt donned  -SOLOMON     Row Name 02/18/21 1140          Toilet Transfer    Type (Toilet Transfer)  -- pt deferred need to complete toileting this date  -SOLOMON Carballo  "Name 02/18/21 1140          Functional Mobility    Functional Mobility- Ind. Level  not tested  -JY     Functional Mobility- Comment  defer to PT for specifics  -SOLOMON     Row Name 02/18/21 1140          Activities of Daily Living    BADL Assessment/Intervention  bathing;upper body dressing;lower body dressing;feeding;grooming  -JANGELICA     Row Name 02/18/21 1140          Mobility    Extremity Weight-bearing Status  left upper extremity;right upper extremity  -JY     Left Upper Extremity (Weight-bearing Status)  (S) non weight-bearing (NWB)  -JY     Right Upper Extremity (Weight-bearing Status)  (S) non weight-bearing (NWB)  -JY     Row Name 02/18/21 1140          Bathing Assessment/Intervention    [place] Level (Bathing)  bathing skills;upper body;other (see comments);maximum assist (25% patient effort);verbal cues B axilla care  -JY     Assistive Devices (Bathing)  other (see comments) harish tech with further restrictions d/t B humeral fx  -JY     Position (Bathing)  unsupported sitting  -JY     Comment (Bathing)  no family/CG present this date for training; strongly emphasized pt demonstrating lead to guide staff at next rehab location in regard to ADLs, etc, had pt verbalize seq to OT this date. Facilitated deneral harish tech with limited use of opposite UE (w/ NWB) to complete \"flossing\" for washing, UE restrictions limit pt participation; pt will grossly require max A until some restrictions are lifted given BUE impact d/t limited reach and limited use of opposite UE; pt does present with more ability to use RUE for elbow flex/ext to wash more of L side; staff usually most assists with posterior axilla care; reviewed no showering while L UE nerve cath still in place  -JANGELICA     Row Name 02/18/21 1140          Upper Body Dressing Assessment/Training    [place] Level (Upper Body Dressing)  don;doff;pajama/robe;other (see comments);maximum assist (25% patient effort);dependent (less than 25% patient effort);verbal " cues sling  -JY     Assistive Devices (Upper Body Dressing)  other (see comments) harish tech with further restrictions given B humeral fx  -JY     Position (Upper Body Dressing)  supported sitting;unsupported sitting  -JY     Comment (Upper Body Dressing)  no family/CG present for training this date; reiterated pt demonstrating lead, seq to OT to simulate directing care at next level of rehab; reality is pt will require increased A for UBD given B humeral fx, pt able to assist only minimally in unadhering neck strap on both slings with limited elbow flex/ext; dep for gross sling mgmt d/d no active shoulder ROM allowed; reiterated the optimal sequencing in order for threading/unthreading and mgmt around nerve cath at E; only able to assist minimally with pulling garment down/up when in range where hand grasp and elbow flex/ext is warranted  -JY     Row Name 02/18/21 1140          Lower Body Dressing Assessment/Training    [place] Level (Lower Body Dressing)  doff;don;pants/bottoms;other (see comments);shoes/slippers;socks;dependent (less than 25% patient effort) undergarments  -JY     Position (Lower Body Dressing)  unsupported sitting;supported standing  -JY     Comment (Lower Body Dressing)  pt u/a to reach LB garments (B UE immobilized) to manage up/down, u/a to complete IR either given shoulder restrictions  -JY     Row Name 02/18/21 1140          Self-Feeding Assessment/Training    [place] Level (Feeding)  liquids to mouth;standby assist;set up  -JY     Assistive Devices (Feeding)  adapted cup;scoop dish/plate guard  -JY     Position (Self-Feeding)  supported sitting  -JY     Comment (Feeding)  pt continues to report/demo increased I with scoop plate w/ guard and adapted cup and more finger food options; re educated pt on further AE options if needed at d/c  -JY     Row Name 02/18/21 1140          Grooming Assessment/Training    [place] Level (Grooming)  not tested  -JY     Comment (Grooming)   did not assess, reviewed with pt though option for LH comb/brush to comb hair with extended length to allow flex/ext, sup/pronation with neutral adducted shoulder  -       User Key  (r) = Recorded By, (t) = Taken By, (c) = Cosigned By    Initials Name Provider Type    Citlalli Elmore, OT Occupational Therapist        Obj/Interventions     [facility] Name 02/18/21 1158          Sensory Assessment (Somatosensory)    Sensory Assessment (Somatosensory)  left UE  -JY     Left UE Sensory Assessment  general sensation;light touch awareness;light touch localization;intact improved sensation, no report of numbness or tingling at B UEs, still diminished sensation at more proximal L UE  -Y     [facility] Name 02/18/21 1158          Sensory Interventions    Comment, Sensory Intervention  improved sensation, no report of numbness or tingling at B UEs, still diminished sensation at more proximal L UE  -JY     [facility] Name 02/18/21 1158          Vision Assessment/Intervention    Visual Impairment/Limitations  corrective lenses for reading  -[facility] Name 02/18/21 1158          Elbow/Forearm (Therapeutic Exercise)    Elbow/Forearm (Therapeutic Exercise)  AROM (active range of motion)  -     Elbow/Forearm AROM (Therapeutic Exercise)  bilateral;flexion;extension;supination;pronation;sitting;10 repetitions  -[facility] Name 02/18/21 1158          Wrist (Therapeutic Exercise)    Wrist (Therapeutic Exercise)  AROM (active range of motion)  -     Wrist AROM (Therapeutic Exercise)  bilateral;flexion;extension;10 repetitions  -JY     Row Name 02/18/21 1158          Hand (Therapeutic Exercise)    Hand (Therapeutic Exercise)  AROM (active range of motion)  -     Hand AROM/AAROM (Therapeutic Exercise)  bilateral;AROM (active range of motion);finger flexion;finger extension;10 repetitions  -JY     Row Name 02/18/21 1158          Balance    Balance Assessment  sitting static balance;sitting dynamic balance;standing static balance;standing dynamic  balance  -JY     Static Sitting Balance  WFL;supported;unsupported;sitting in chair  -JY     Dynamic Sitting Balance  WFL;supported;unsupported;sitting in chair;other (see comments) ADLs, HEP  -JY     Static Standing Balance  WFL;supported;standing  -JY     Dynamic Standing Balance  mild impairment;supported;standing;other (see comments) fxl transfer  -JY     Balance Interventions  sitting;standing;static;dynamic;occupation based/functional task  -JY     Comment, Balance  pt with no LOB throughout sitting and standing, still intermittent CGA in standing given BUEs immobilized and balance mechanisms/strategies decreased  -JY     Row Name 02/18/21 1158          Therapeutic Exercise    Therapeutic Exercise  elbow/forearm;wrist;hand;other (see comments) Reviewed with HEP as indicated by MD with focus on BUE AROM at elbow/wrist/hand; pt tolerated 1 set x 10 reps with no increase in pain; pt demonstrated more lead in initiating and prog HEP this date; review of freq and highlighted cues on ref sheet for A  -JY       User Key  (r) = Recorded By, (t) = Taken By, (c) = Cosigned By    Initials Name Provider Type    Citlalli Elmore OT Occupational Therapist        Goals/Plan    No documentation.       Clinical Impression     Row Name 02/18/21 1255          Pain Assessment    Additional Documentation  Pain Scale: Numbers Pre/Post-Treatment (Group)  -JY     Row Name 02/18/21 1255          Pain Scale: Numbers Pre/Post-Treatment    Pretreatment Pain Rating  6/10  -JY     Posttreatment Pain Rating  5/10  -JY     Pain Location - Side  Bilateral  -JY     Pain Location - Orientation  upper  -JY     Pain Location  shoulder  -JY     Pre/Posttreatment Pain Comment  pt reported greater pain prior to exercise, improved with completion of HEP  -JY     Pain Intervention(s)  Repositioned;Cold applied;Ambulation/increased activity therapeutic exercise  -JY     Row Name 02/18/21 0869          Plan of Care Review    Plan of Care Reviewed With   patient  -JY     Progress  improving  -JY     Outcome Summary  PT alert, O x 4 and eager for anticipated d/c this date. Pt received [facility] where pt prefers to sit/sleep d//t improved comfort and position of BUEs thus bed mobility not assessed this date. Pt completed seated HEP as indicated by MD with focus on BUE AROM elbow/wrist/hand of which pt was able to tolerate x 10 reps each. No increase but rather decrease in pain noted. Edema noted in L UE where positioning of sling req'd adjustment at OT arrival. OT reviewed frequency, position for HEP to continue at d/c. Pt completed doffing gown with max A, largely dep for redonning personal shirt given more restricted garment and assured adherence to B UE precautions. Pt able to assist minimally in unadhering neck strap at B slings, yet grossly dep for d/d sling. Pt dep for LBD d/t inability to reach distally and further u/a to demo IR, shoulder ext etc to assist with mgmt. OT collectively reviewed optimal position, tech, strategy for ADLs, sling mgmt, HEP and general safety awareness with BUE precautions adhered throughout. Pt stood with SBA and completed integrated stepping, fxl mob with intermittent CGA for some unsteadiness still noted and fact that pt's balance mechanisms and techs are hindered with BUEs immobilized. Pt was challenged to direct care, take lead in verbalized instruction, demo toward OT to simulate direction toward staff at next level of care. Pt demonstrated improved ability to direct care. OT reviewed potential AE that could further benefit pt past hospitalization nick for s/f and g/h. PT verbalized understanding. Will continue to progress pt as able while hospitalized yet anticipate d/c this date.  -JY     Row Name 02/18/21 1255          Vital Signs    Pre Systolic BP Rehab  168  -JY     Pre Treatment Diastolic BP  60  -JY     Pretreatment Heart Rate (beats/min)  84  -JY     Pre SpO2 (%)  99  -JY     O2 Delivery Pre Treatment  room air  -JY     Intra  SpO2 (%)  93  -JY     O2 Delivery Intra Treatment  room air  -JY     Post SpO2 (%)  97  -JY     O2 Delivery Post Treatment  room air  -JY     Pre Patient Position  Sitting  -JY     Intra Patient Position  Standing  -JY     Post Patient Position  Sitting  -JY     Row Name 02/18/21 1255          Positioning and Restraints    Pre-Treatment Position  sitting in chair/recliner  -JY     Post Treatment Position  chair  -JY     In Chair  notified nsg;reclined;call light within reach;encouraged to call for assist;exit alarm on;waffle cushion;legs elevated interscalene L UE intact, ice pack donned LUE, BUE slings donned  -JY       User Key  (r) = Recorded By, (t) = Taken By, (c) = Cosigned By    Initials Name Provider Type    Citlalli Elmore OT Occupational Therapist        Outcome Measures     Row Name 02/18/21 1306          How much help from another is currently needed...    Putting on and taking off regular lower body clothing?  1  -JY     Bathing (including washing, rinsing, and drying)  2  -JY     Toileting (which includes using toilet bed pan or urinal)  1  -JY     Putting on and taking off regular upper body clothing  2  -JY     Taking care of personal grooming (such as brushing teeth)  2  -JY     Eating meals  3  -JY     AM-PAC 6 Clicks Score (OT)  11  -JY     Row Name 02/18/21 1306          Functional Assessment    Outcome Measure Options  AM-PAC 6 Clicks Daily Activity (OT)  -JY       User Key  (r) = Recorded By, (t) = Taken By, (c) = Cosigned By    Initials Name Provider Type    Citlalli Elmore OT Occupational Therapist        Occupational Therapy Education                 Title: PT OT SLP Therapies (In Progress)     Topic: Occupational Therapy (In Progress)     Point: ADL training (In Progress)     Description:   Instruct learner(s) on proper safety adaptation and remediation techniques during self care or transfers.   Instruct in proper use of assistive devices.              Learning Progress Summary            Patient Acceptance, E,D, NR by SOLOMON at 2/18/2021 0920    Acceptance, E,D, NR by SOLOMON at 2/17/2021 1059    Acceptance, E,D, NR by GAYY at 2/16/2021 1004    Acceptance, E,D, NR by SOLOMON at 2/15/2021 0815    Acceptance, TB,E,D,H, VU,DU,NR by HK at 2/14/2021 1551   Family Acceptance, E,D, NR by SOLOMON at 2/17/2021 1059                   Point: Home exercise program (In Progress)     Description:   Instruct learner(s) on appropriate technique for monitoring, assisting and/or progressing therapeutic exercises/activities.              Learning Progress Summary           Patient Acceptance, E,D, NR by SOLOMON at 2/18/2021 0920    Acceptance, E,D, NR by SOLOMON at 2/17/2021 1059    Acceptance, E,D, NR by SOLOMON at 2/16/2021 1004    Acceptance, E,D, NR by SOLOMON at 2/15/2021 0815    Acceptance, TB,E,D,H, VU,DU,NR by HK at 2/14/2021 1551   Family Acceptance, E,D, NR by SOLOMON at 2/17/2021 1059                   Point: Precautions (In Progress)     Description:   Instruct learner(s) on prescribed precautions during self-care and functional transfers.              Learning Progress Summary           Patient Acceptance, E,D, NR by SOLOMON at 2/18/2021 0920    Acceptance, E,D, NR by SOLOMON at 2/17/2021 1059    Acceptance, E,D, NR by SOLOMON at 2/16/2021 1004    Acceptance, E,D, NR by SOLOMON at 2/15/2021 0815    Acceptance, TB,E,D,H, VU,DU,NR by HK at 2/14/2021 1551   Family Acceptance, E,D, NR by SOLOMON at 2/17/2021 1059                   Point: Body mechanics (In Progress)     Description:   Instruct learner(s) on proper positioning and spine alignment during self-care, functional mobility activities and/or exercises.              Learning Progress Summary           Patient Acceptance, E,D, NR by SOLOMON at 2/18/2021 0920    Acceptance, E,D, NR by SOLOMON at 2/17/2021 1059    Acceptance, E,D, NR by SOLOMON at 2/16/2021 1004    Acceptance, E,D, NR by SOLOMON at 2/15/2021 0815    Acceptance, RAUDEL,CHERI LAWTON,H, MIKEY BELTRE,NR by  at 2/14/2021 1551   Family Acceptance, E,D, NR by SOLOMON at 2/17/2021 1059                                User Key     Initials Effective Dates Name Provider Type Discipline     03/07/18 -  Fina Cooper, OT Occupational Therapist OT    J 01/29/20 -  Citlalli Elena OT Occupational Therapist OT              OT Recommendation and Plan     Plan of Care Review  Plan of Care Reviewed With: patient  Progress: improving  Outcome Summary: PT alert, O x 4 and eager for anticipated d/c this date. Pt received [facility]IC where pt prefers to sit/sleep d//t improved comfort and position of BUEs thus bed mobility not assessed this date. Pt completed seated HEP as indicated by MD with focus on BUE AROM elbow/wrist/hand of which pt was able to tolerate x 10 reps each. No increase but rather decrease in pain noted. Edema noted in L UE where positioning of sling req'd adjustment at OT arrival. OT reviewed frequency, position for HEP to continue at d/c. Pt completed doffing gown with max A, largely dep for redonning personal shirt given more restricted garment and assured adherence to B UE precautions. Pt able to assist minimally in unadhering neck strap at B slings, yet grossly dep for d/d sling. Pt dep for LBD d/t inability to reach distally and further u/a to demo IR, shoulder ext etc to assist with mgmt. OT collectively reviewed optimal position, tech, strategy for ADLs, sling mgmt, HEP and general safety awareness with BUE precautions adhered throughout. Pt stood with SBA and completed integrated stepping, fxl mob with intermittent CGA for some unsteadiness still noted and fact that pt's balance mechanisms and techs are hindered with BUEs immobilized. Pt was challenged to direct care, take lead in verbalized instruction, demo toward OT to simulate direction toward staff at next level of care. Pt demonstrated improved ability to direct care. OT reviewed potential AE that could further benefit pt past hospitalization nick for s/f and g/h. PT verbalized understanding. Will continue to progress pt as able while hospitalized yet  anticipate d/c this date.     Time Calculation:   Time Calculation- OT     Row Name 02/18/21 1308 02/18/21 1103          Time Calculation- OT    OT Start Time  0920 -JY  --     OT Received On  02/18/21 -JY  --     OT Goal Re-Cert Due Date  02/24/21 -JY  --        Timed Charges    85731 - OT Therapeutic Exercise Minutes  21 -JY  --     35308 - Gait Training Minutes   --  12  -LR     98656 - OT Therapeutic Activity Minutes  14 -JY  --     39201 - OT Self Care/Mgmt Minutes  25 -JY  --       User Key  (r) = Recorded By, (t) = Taken By, (c) = Cosigned By    Initials Name Provider Type    Jade Hedrick, PT Physical Therapist    Citlalli Elmore, OT Occupational Therapist        Therapy Charges for Today     Code Description Service Date Service Provider Modifiers Qty    63720127321 HC OT SELF CARE/MGMT/TRAIN EA 15 MIN 2/17/2021 Citlalli Eelna OT GO 2    25651835728 HC OT THER PROC EA 15 MIN 2/17/2021 Citlalli Elena OT GO 2    81329658765 HC OT THERAPEUTIC ACT EA 15 MIN 2/17/2021 Citlalli Elena OT GO 1    32436977711 HC OT SELF CARE/MGMT/TRAIN EA 15 MIN 2/18/2021 Citlalli Elena OT GO 2    14136678507 HC OT THERAPEUTIC ACT EA 15 MIN 2/18/2021 Citlalli Elena OT GO 1    87945745173 HC OT THER PROC EA 15 MIN 2/18/2021 Citlalli Elena OT GO 1               Citlalli Elena OT  2/18/2021

## 2021-02-18 NOTE — THERAPY DISCHARGE NOTE
Patient Name: Keely Haider  : 1945    MRN: [id number]                              Today's Date: 2021       Admit Date: 2021    Visit Dx:     ICD-10-CM ICD-9-CM   1. Closed fracture of both humeri, initial encounter  S42.301A 819.0    S42.302A    2. Closed fracture of nasal bone, initial encounter  S02.2XXA 802.0   3. Fall due to slipping on ice or snow, initial encounter  W00.9XXA E885.9   4. Other closed nondisplaced fracture of proximal end of humerus with routine healing, unspecified laterality, subsequent encounter  S42.296D V54.11     Patient Active Problem List   Diagnosis   • Humerus fracture-bilateral   • Idiopathic scoliosis of thoracolumbar spine   • Left hip pain   • Essential hypertension   • Seizure (CMS/HCC)   • Neck pain     Past Medical History:   Diagnosis Date   • Hypertension    • Osteoarthritis of left hip    • Osteoporosis    • Scoliosis    • Seizures (CMS/HCC)      Past Surgical History:   Procedure Laterality Date   • ABDOMINAL HYSTERECTOMY       General Information     Row Name 21 1103          Physical Therapy Time and Intention    Document Type  therapy note (daily note)  -LR     Mode of Treatment  physical therapy;individual therapy  -LR     Row Name 21 1103          General Information    Patient Profile Reviewed  yes  -LR     Existing Precautions/Restrictions  fall;non-weight bearing;left;right;shoulder;other (see comments) NWB B UE, L interscalene nerve catheter, ORIF L humeral fx, non op R humeral fx,  -LR     Barriers to Rehab  medically complex  -LR     Row Name 21 1103          Cognition    Orientation Status (Cognition)  oriented x 4  -LR     Row Name 21 1103          Safety Issues, Functional Mobility    Safety Issues Affecting Function (Mobility)  sequencing abilities  -LR     Impairments Affecting Function (Mobility)  balance;coordination;endurance/activity tolerance;postural/trunk control;strength;pain;range of motion (ROM)  -LR        User Key  (r) = Recorded By, (t) = Taken By, (c) = Cosigned By    Initials Name Provider Type    LR Jade Maradiaga, PT Physical Therapist        Mobility     Row Name 02/18/21 1103          Bed Mobility    Scooting/Bridging [place] (Bed Mobility)  not tested  -LR     Supine-Sit [place] (Bed Mobility)  not tested  -LR     Comment (Bed Mobility)  [facility] on arrival and at end of treatment.  -LR     Row Name 02/18/21 1103          Transfers    Comment (Transfers)  Verbal cues to lean forward and to use LEs to power up into standing. Cues to back up to chair until she felt it behind her LEs and to sit as far back into chair as possible, using her legs to lower her into sitting. Denied dizziness upon standing up.  -LR     Row Name 02/18/21 1103          Sit-Stand Transfer    Sit-Stand [place] (Transfers)  verbal cues;standby assist  -LR     Assistive Device (Sit-Stand Transfers)  other (see comments) no AD  -LR     Row Name 02/18/21 1103          Gait/Stairs (Locomotion)    [place] Level (Gait)  verbal cues;contact guard  -LR     Assistive Device (Gait)  other (see comments) no AD  -LR     Distance in Feet (Gait)  150  -LR     Deviations/Abnormal Patterns (Gait)  bilateral deviations;shalonda decreased;gait speed decreased;stride length decreased;festinating/shuffling  -LR     Bilateral Gait Deviations  heel strike decreased  -LR     [place] Level (Stairs)  not tested  -LR     Comment (Gait/Stairs)  Patient ambulated with step to gait pattern at slow pace with short shuffling steps. Would improve briefly after cues for increased foot clearance and increased step length and then revert back to short shuffling steps. Mildly unsteady throughout ambulation but no LOB. Gait limited by fatigue.  -LR     Row Name 02/18/21 1103          Mobility    Extremity Weight-bearing Status  left upper extremity;right upper extremity  -LR     Left Upper Extremity (Weight-bearing Status)  (S) non weight-bearing  (NWB)  -LR     Right Upper Extremity (Weight-bearing Status)  (S) non weight-bearing (NWB)  -LR       User Key  (r) = Recorded By, (t) = Taken By, (c) = Cosigned By    Initials Name Provider Type    Jade Hedrick, PT Physical Therapist        Obj/Interventions     Row Name 02/18/21 1103          Motor Skills    Therapeutic Exercise  -- deferred to OT  -LR     Row Name 02/18/21 1103          Balance    Static Sitting Balance  WFL;unsupported;sitting in chair  -LR     Dynamic Sitting Balance  WFL;unsupported;sitting in chair  -LR     Static Standing Balance  WFL;supported;standing  -LR     Dynamic Standing Balance  mild impairment;supported;standing  -LR       User Key  (r) = Recorded By, (t) = Taken By, (c) = Cosigned By    Initials Name Provider Type    LR Jade Maradiaga, PT Physical Therapist        Goals/Plan     Row Name 02/18/21 1103          Bed Mobility Goal 1 (PT)    Activity/Assistive Device (Bed Mobility Goal 1, PT)  sit to supine/supine to sit  -LR     Time Frame (Bed Mobility Goal 1, PT)  long term goal (LTG);5 days  -LR     Progress/Outcomes (Bed Mobility Goal 1, PT)  goal not met;discharged from facility;progress slower than expected  -     Row Name 02/18/21 1103          Transfer Goal 1 (PT)    Activity/Assistive Device (Transfer Goal 1, PT)  sit-to-stand/stand-to-sit  -LR     [place] Level/Cues Needed (Transfer Goal 1, PT)  independent  -LR     Time Frame (Transfer Goal 1, PT)  long term goal (LTG);5 days  -LR     Progress/Outcome (Transfer Goal 1, PT)  good progress toward goal;goal not met;discharged from facility  -LR     Row Name 02/18/21 1103          Gait Training Goal 1 (PT)    Activity/Assistive Device (Gait Training Goal 1, PT)  gait (walking locomotion)  -LR     [place] Level (Gait Training Goal 1, PT)  independent  -LR     Distance (Gait Training Goal 1, PT)  400 feet  -LR     Time Frame (Gait Training Goal 1, PT)  long term goal (LTG);5 days  -LR      Progress/Outcome (Gait Training Goal 1, PT)  progress slower than expected;goal not met;discharged from facility  -LR       User Key  (r) = Recorded By, (t) = Taken By, (c) = Cosigned By    Initials Name Provider Type    LR Jade Maradiaga, PT Physical Therapist        Clinical Impression     Row Name 02/18/21 1103          Pain    Additional Documentation  Pain Scale: Numbers Pre/Post-Treatment (Group)  -LR     Row Name 02/18/21 1103          Pain Scale: Numbers Pre/Post-Treatment    Pretreatment Pain Rating  3/10  -LR     Posttreatment Pain Rating  4/10  -LR     Pain Location - Side  Bilateral  -LR     Pain Location  shoulder  -LR     Pain Intervention(s)  Ambulation/increased activity;Repositioned  -LR     Row Name 02/18/21 1103          Plan of Care Review    Plan of Care Reviewed With  patient  -LR     Progress  improving  -LR     Outcome Summary  Patient ambulated 150 feet with CGA, limited by fatigue. Patient continues to ambulate with short shuffling steps and is mildly unsteady throughout but no LOB. O2 sats WNL with activity today. Patient has been d/c to SNF today.  -LR     Row Name 02/18/21 1103          Therapy Assessment/Plan (PT)    Rehab Potential (PT)  good, to achieve stated therapy goals  -LR     Criteria for Skilled Interventions Met (PT)  yes;meets criteria;skilled treatment is necessary  -LR     Row Name 02/18/21 1103          Vital Signs    Pre SpO2 (%)  97  -LR     O2 Delivery Pre Treatment  room air  -LR     Post SpO2 (%)  96  -LR     O2 Delivery Post Treatment  room air  -LR     Pre Patient Position  Sitting  -LR     Post Patient Position  Sitting  -LR     Row Name 02/18/21 1103          Positioning and Restraints    Pre-Treatment Position  sitting in chair/recliner  -LR     Post Treatment Position  chair  -LR     In Chair  notified nsg;reclined;sitting;call light within reach;encouraged to call for assist;exit alarm on;legs elevated  -LR       User Key  (r) = Recorded By, (t) =  Taken By, (c) = Cosigned By    Initials Name Provider Type    Jade Hedrick, PT Physical Therapist        Outcome Measures     Row Name 02/18/21 1103          How much help from another person do you currently need...    Turning from your back to your side while in flat bed without using bedrails?  2  -LR     Moving from lying on back to sitting on the side of a flat bed without bedrails?  2  -LR     Moving to and from a bed to a chair (including a wheelchair)?  3  -LR     Standing up from a chair using your arms (e.g., wheelchair, bedside chair)?  3  -LR     Climbing 3-5 steps with a railing?  2  -LR     To walk in hospital room?  3  -LR     AM-PAC 6 Clicks Score (PT)  15  -LR     Row Name 02/18/21 1103          Functional Assessment    Outcome Measure Options  AM-PAC 6 Clicks Basic Mobility (PT)  -LR       User Key  (r) = Recorded By, (t) = Taken By, (c) = Cosigned By    Initials Name Provider Type    Jade Hedrick, TAYLOR Physical Therapist        Physical Therapy Education                 Title: PT OT SLP Therapies (In Progress)     Topic: Physical Therapy (Done)     Point: Mobility training (Done)     Learning Progress Summary           Patient Acceptance, E,D, VU,DU by LR at 2/18/2021 1103    Comment: Educated on NWB status of B UEs, safety with mobility, correct sit<->stand t/f technique, correct gait mechanics, and progression of POC.    Rodolfo, E, VU by SC at 2/17/2021 1505    Comment: reviewed benefits of activity    Acceptance, E,D, VU,NR by LR at 2/16/2021 0914    Comment: Educated on precautions, NWB status, correct sit<->stand t/f technique, correct gait mechanics, safety with mobility, PLB, and progression of POC.    Acceptance, E,TB, VU,NR by AY at 2/15/2021 1105    Acceptance, E,TB, VU,NR by AY at 2/15/2021 1105    Comment: pt educated on weight bearing status, proper technique for transfers, gait mechanics, purlsed lip breathing technique, HEP, and POC progression.                    Point: Home exercise program (Done)     Learning Progress Summary           Patient Acceptance, E,D, VU,DU by LR at 2/18/2021 1103    Comment: Educated on NWB status of B UEs, safety with mobility, correct sit<->stand t/f technique, correct gait mechanics, and progression of POC.    LEIGHANN Reynolds, VU by SC at 2/17/2021 1505    Comment: reviewed benefits of activity    Acceptance, E,D, VU,NR by LR at 2/16/2021 0914    Comment: Educated on precautions, NWB status, correct sit<->stand t/f technique, correct gait mechanics, safety with mobility, PLB, and progression of POC.    Acceptance, E,TB, VU,NR by AY at 2/15/2021 1105    Acceptance, E,TB, VU,NR by AY at 2/15/2021 1105    Comment: pt educated on weight bearing status, proper technique for transfers, gait mechanics, purlsed lip breathing technique, HEP, and POC progression.                   Point: Body mechanics (Done)     Learning Progress Summary           Patient Acceptance, E,D, VU,DU by LR at 2/18/2021 1103    Comment: Educated on NWB status of B UEs, safety with mobility, correct sit<->stand t/f technique, correct gait mechanics, and progression of POC.    LEIGHANN Reynolds VU by SC at 2/17/2021 1505    Comment: reviewed benefits of activity    Acceptance, E,D, VU,NR by LR at 2/16/2021 0914    Comment: Educated on precautions, NWB status, correct sit<->stand t/f technique, correct gait mechanics, safety with mobility, PLB, and progression of POC.    Acceptance, E,TB, VU,NR by AY at 2/15/2021 1105    Acceptance, E,TB, VU,NR by AY at 2/15/2021 1105    Comment: pt educated on weight bearing status, proper technique for transfers, gait mechanics, purlsed lip breathing technique, HEP, and POC progression.                   Point: Precautions (Done)     Learning Progress Summary           Patient Acceptance, E,D, VU,DU by LR at 2/18/2021 1103    Comment: Educated on NWB status of B UEs, safety with mobility, correct sit<->stand t/f technique, correct gait mechanics, and  progression of POC.    Eager, E, VU by SC at 2/17/2021 1505    Comment: reviewed benefits of activity    Acceptance, E,D, VU,NR by LR at 2/16/2021 0914    Comment: Educated on precautions, NWB status, correct sit<->stand t/f technique, correct gait mechanics, safety with mobility, PLB, and progression of POC.    Acceptance, E,TB, VU,NR by AY at 2/15/2021 1105    Acceptance, E,TB, VU,NR by AY at 2/15/2021 1105    Comment: pt educated on weight bearing status, proper technique for transfers, gait mechanics, purlsed lip breathing technique, HEP, and POC progression.                               User Key     Initials Effective Dates Name Provider Type Discipline    SC 06/19/15 -  Juli Avery, PT Physical Therapist PT    LR 06/19/15 -  Jade Maradiaga, PT Physical Therapist PT    AY 11/10/20 -  Rosalinda Elena, TAYLOR Physical Therapist PT              PT Recommendation and Plan     Plan of Care Reviewed With: patient  Progress: improving  Outcome Summary: Patient ambulated 150 feet with CGA, limited by fatigue. Patient continues to ambulate with short shuffling steps and is mildly unsteady throughout but no LOB. O2 sats WNL with activity today. Patient has been d/c to SNF today.     Time Calculation:   PT Charges     Row Name 02/18/21 1103             Time Calculation    Start Time  1103  -LR      PT Received On  02/18/21  -      PT Goal Re-Cert Due Date  02/25/21  -LR         Time Calculation- PT    Total Timed Code Minutes- PT  12 minute(s)  -LR         Timed Charges    33311 - Gait Training Minutes   12  -LR        User Key  (r) = Recorded By, (t) = Taken By, (c) = Cosigned By    Initials Name Provider Type    LR Jade Maradiaga, PT Physical Therapist        Therapy Charges for Today     Code Description Service Date Service Provider Modifiers Qty    [id number] HC GAIT TRAINING EA 15 MIN 2/18/2021 Jade Maradiaga, PT GP 1          PT G-Codes  Outcome Measure Options: AM-PAC 6 Clicks Basic  Mobility (PT)  AM-PAC 6 Clicks Score (PT): 15  AM-PAC 6 Clicks Score (OT): 10    PT Discharge Summary  Anticipated Discharge Disposition (PT): skilled nursing facility  Reason for Discharge: Discharge from facility  Outcomes Achieved: Refer to plan of care for updates on goals achieved  Discharge Destination: SNF    Jade Maradiaga, PT  2/18/2021

## 2021-03-19 ENCOUNTER — IMMUNIZATION (OUTPATIENT)
Dept: VACCINE CLINIC | Facility: HOSPITAL | Age: 76
End: 2021-03-19

## 2021-03-19 PROCEDURE — 0001A: CPT | Performed by: INTERNAL MEDICINE

## 2021-03-19 PROCEDURE — 91300 HC SARSCOV02 VAC 30MCG/0.3ML IM: CPT | Performed by: INTERNAL MEDICINE

## 2021-04-09 ENCOUNTER — IMMUNIZATION (OUTPATIENT)
Dept: VACCINE CLINIC | Facility: HOSPITAL | Age: 76
End: 2021-04-09

## 2021-04-09 PROCEDURE — 0002A: CPT | Performed by: INTERNAL MEDICINE

## 2021-04-09 PROCEDURE — 91300 HC SARSCOV02 VAC 30MCG/0.3ML IM: CPT | Performed by: INTERNAL MEDICINE

## 2021-04-26 ENCOUNTER — TRANSCRIBE ORDERS (OUTPATIENT)
Dept: ADMINISTRATIVE | Facility: HOSPITAL | Age: 76
End: 2021-04-26

## 2021-04-26 DIAGNOSIS — M85.80 OSTEOPENIA, UNSPECIFIED LOCATION: ICD-10-CM

## 2021-04-26 DIAGNOSIS — Z12.31 VISIT FOR SCREENING MAMMOGRAM: Primary | ICD-10-CM

## 2021-04-26 DIAGNOSIS — Z78.0 POSTMENOPAUSAL: Primary | ICD-10-CM

## 2021-05-26 ENCOUNTER — APPOINTMENT (OUTPATIENT)
Dept: OTHER | Facility: HOSPITAL | Age: 76
End: 2021-05-26

## 2021-05-26 ENCOUNTER — HOSPITAL ENCOUNTER (OUTPATIENT)
Dept: MAMMOGRAPHY | Facility: HOSPITAL | Age: 76
Discharge: HOME | End: 2021-05-26

## 2021-05-26 DIAGNOSIS — Z12.31 VISIT FOR SCREENING MAMMOGRAM: ICD-10-CM

## 2021-07-15 ENCOUNTER — APPOINTMENT (OUTPATIENT)
Dept: BONE DENSITY | Facility: HOSPITAL | Age: 76
End: 2021-07-15

## 2022-02-28 ENCOUNTER — OFFICE VISIT (OUTPATIENT)
Dept: OBSTETRICS AND GYNECOLOGY | Facility: CLINIC | Age: 77
End: 2022-02-28

## 2022-02-28 VITALS
SYSTOLIC BLOOD PRESSURE: 138 MMHG | WEIGHT: 141.6 LBS | BODY MASS INDEX: 27.8 KG/M2 | DIASTOLIC BLOOD PRESSURE: 60 MMHG | HEIGHT: 60 IN

## 2022-02-28 DIAGNOSIS — Z90.710 HISTORY OF TOTAL ABDOMINAL HYSTERECTOMY AND BILATERAL SALPINGO-OOPHORECTOMY: Primary | ICD-10-CM

## 2022-02-28 DIAGNOSIS — N95.2 PERIMENOPAUSAL ATROPHIC VAGINITIS: ICD-10-CM

## 2022-02-28 DIAGNOSIS — N81.11 MIDLINE CYSTOCELE: ICD-10-CM

## 2022-02-28 DIAGNOSIS — Z90.722 HISTORY OF TOTAL ABDOMINAL HYSTERECTOMY AND BILATERAL SALPINGO-OOPHORECTOMY: Primary | ICD-10-CM

## 2022-02-28 DIAGNOSIS — Z90.79 HISTORY OF TOTAL ABDOMINAL HYSTERECTOMY AND BILATERAL SALPINGO-OOPHORECTOMY: Primary | ICD-10-CM

## 2022-02-28 PROCEDURE — 99203 OFFICE O/P NEW LOW 30 MIN: CPT | Performed by: OBSTETRICS & GYNECOLOGY

## 2022-02-28 PROCEDURE — A4562 PESSARY, NON RUBBER,ANY TYPE: HCPCS | Performed by: OBSTETRICS & GYNECOLOGY

## 2022-02-28 PROCEDURE — 57160 INSERT PESSARY/OTHER DEVICE: CPT | Performed by: OBSTETRICS & GYNECOLOGY

## 2022-02-28 RX ORDER — CONJUGATED ESTROGENS 0.62 MG/G
CREAM VAGINAL
Qty: 1 EACH | Refills: 3 | Status: SHIPPED | OUTPATIENT
Start: 2022-02-28

## 2022-02-28 RX ORDER — ACETAMINOPHEN 160 MG
TABLET,DISINTEGRATING ORAL
COMMUNITY
Start: 2021-12-20

## 2022-02-28 RX ORDER — ESCITALOPRAM OXALATE 10 MG/1
TABLET ORAL
COMMUNITY

## 2022-02-28 RX ORDER — CONJUGATED ESTROGENS 0.62 MG/G
CREAM VAGINAL
Qty: 1 EACH | Refills: 3 | Status: SHIPPED | OUTPATIENT
Start: 2022-02-28 | End: 2022-02-28 | Stop reason: SDUPTHER

## 2022-02-28 NOTE — ASSESSMENT & PLAN NOTE
Options reviewed including surgery with anterior repair.  Patient declines surgery and wishes to proceed with trial of pessary.

## 2022-02-28 NOTE — PROGRESS NOTES
"     Gynecologic Exam Note      Chief Complaint   Patient presents with   • Bladder Prolapse     referred by Dr Perez        Subjective   HPI  Keely Haider is a 76 y.o. female, , who wishes to establish care and presents for evaluation of bladder prolapse.  Patient states that she was referred by Dr. Perez for evaluation. Dr. Perez treated her for a UTI in December in  and several weeks later patient reports she felt as if \"something was there or a growth.\" She returned to Dr. Perez who then said she had bladder prolapse and needed to be evaluated by a GYN MD and she was then referred here.     She states she has experienced this problem for 2 months.  She states she has no pain and there is just a constant awareness of \" feeling like something is there\". The patient reports additional symptoms as none.      Her last LMP was No LMP recorded. Patient has had a hysterectomy..  Periods are absent , patient is post menopausal. Partner Status: Marital Status: .  New Partners since last visit: no.  Desires STD Screening: no.    Additional OB/GYN History   Current contraception: contraceptive methods: Post menopausal status  Desires to: continue contraception  Last Pap : atleast 2 years ago per patient  Last Completed Pap Smear     This patient has no relevant Health Maintenance data.        History of abnormal Pap smear: not that she remembers   Last mammogram: approx 3-4 years ago   Last Completed Mammogram     This patient has no relevant Health Maintenance data.        Tobacco Usage?: No   OB History        1    Para   1    Term   1            AB        Living           SAB        IAB        Ectopic        Molar        Multiple        Live Births                    Health Maintenance   Topic Date Due   • DXA SCAN  Never done   • COLORECTAL CANCER SCREENING  Never done   • TDAP/TD VACCINES (1 - Tdap) Never done   • ZOSTER VACCINE (1 of 2) Never done   • Pneumococcal " "Vaccine 65+ (1 of 1 - PPSV23) Never done   • INFLUENZA VACCINE  08/01/2021   • COVID-19 Vaccine (3 - Booster for Pfizer series) 09/09/2021   • HEPATITIS C SCREENING  Never done   • ANNUAL WELLNESS VISIT  Never done   • LIPID PANEL  Never done       The additional following portions of the patient's history were reviewed and updated as appropriate: allergies, current medications, past family history, past medical history, past social history, past surgical history and problem list.    Review of Systems   Constitutional: Negative.    HENT: Negative.    Eyes: Negative.    Respiratory: Negative.    Cardiovascular: Negative.    Gastrointestinal: Negative.    Endocrine: Negative.    Genitourinary: Negative for breast discharge, breast lump, breast pain, difficulty urinating, dysuria, pelvic pain, urinary incontinence, vaginal bleeding, vaginal discharge and vaginal pain.        Bladder prolapse    Musculoskeletal: Negative.    Skin: Negative.    Allergic/Immunologic: Negative.    Neurological: Negative.    Hematological: Negative.    Psychiatric/Behavioral: Negative.        I have reviewed and agree with the HPI, ROS, and historical information as entered above. Marcelino Serrano MD    Objective   /60   Ht 152.4 cm (60\")   Wt 64.2 kg (141 lb 9.6 oz)   BMI 27.65 kg/m²     Physical Exam  Vitals and nursing note reviewed. Exam conducted with a chaperone present.   Constitutional:       Appearance: Normal appearance. She is normal weight.   HENT:      Head: Normocephalic and atraumatic.   Cardiovascular:      Rate and Rhythm: Regular rhythm.      Heart sounds: Normal heart sounds.   Pulmonary:      Effort: Pulmonary effort is normal.   Abdominal:      General: Abdomen is flat. There is no distension.      Palpations: Abdomen is soft.      Tenderness: There is no abdominal tenderness. There is no guarding.   Genitourinary:     General: Normal vulva.      Exam position: Lithotomy position.      Labia:         Right: " No rash, tenderness or lesion.         Left: No rash, tenderness or lesion.       Urethra: No urethral pain, urethral swelling or urethral lesion.      Vagina: Prolapsed vaginal walls present. No tenderness or lesions.      Uterus: Absent. Not fixed.       Adnexa:         Right: No mass, tenderness or fullness.          Left: No mass, tenderness or fullness.        Rectum: No external hemorrhoid.      Comments: Chaperone Present; atrophy and flattening of vaginal rugate.  Vaginal cuff well supported and without lesions.  Grade 3 midline cystocele.  No rectocele.  Neurological:      Mental Status: She is alert and oriented to person, place, and time.   Psychiatric:         Behavior: Behavior normal.         Assessment/Plan     Assessment     Problem List Items Addressed This Visit     Midline cystocele    Overview     Midline grade 3 cystocele;  Fit with #3 ring pessary with support.   Denies any history of urinary incontinence.  States voiding has improved with pessary.         Current Assessment & Plan     Options reviewed including surgery with anterior repair.  Patient declines surgery and wishes to proceed with trial of pessary.         Relevant Medications    conjugated estrogens (Premarin) 0.625 MG/GM vaginal cream    History of total abdominal hysterectomy and bilateral salpingo-oophorectomy - Primary    Overview     Age 37  GUSTABO/BSO for endometriosis.          Perimenopausal atrophic vaginitis    Overview     Rx Premarin cream.  Samples given.                 Plan     Return in about 4 weeks (around 3/28/2022) for Next scheduled follow up.  1. Fit with #3 ring pessary with support.  Patient states is comfortable and is able to void without difficulty.  States voiding function has improved with the pessary.    2. Remove pessary at night and reinserted in the morning instructions reviewed.      Marcelino Serrano MD  02/28/2022

## 2022-03-01 ENCOUNTER — TELEPHONE (OUTPATIENT)
Dept: OBSTETRICS AND GYNECOLOGY | Facility: CLINIC | Age: 77
End: 2022-03-01

## 2022-03-01 NOTE — TELEPHONE ENCOUNTER
Pt called and asked to speak with a nurse. Pt states she can not get her pessary ring out. Please advise

## 2022-03-02 ENCOUNTER — OFFICE VISIT (OUTPATIENT)
Dept: OBSTETRICS AND GYNECOLOGY | Facility: CLINIC | Age: 77
End: 2022-03-02

## 2022-03-02 ENCOUNTER — TELEPHONE (OUTPATIENT)
Dept: OBSTETRICS AND GYNECOLOGY | Facility: CLINIC | Age: 77
End: 2022-03-02

## 2022-03-02 VITALS
BODY MASS INDEX: 27.88 KG/M2 | SYSTOLIC BLOOD PRESSURE: 116 MMHG | WEIGHT: 142 LBS | DIASTOLIC BLOOD PRESSURE: 60 MMHG | HEIGHT: 60 IN

## 2022-03-02 DIAGNOSIS — Z90.79 HISTORY OF TOTAL ABDOMINAL HYSTERECTOMY AND BILATERAL SALPINGO-OOPHORECTOMY: ICD-10-CM

## 2022-03-02 DIAGNOSIS — Z90.722 HISTORY OF TOTAL ABDOMINAL HYSTERECTOMY AND BILATERAL SALPINGO-OOPHORECTOMY: ICD-10-CM

## 2022-03-02 DIAGNOSIS — Z90.710 HISTORY OF TOTAL ABDOMINAL HYSTERECTOMY AND BILATERAL SALPINGO-OOPHORECTOMY: ICD-10-CM

## 2022-03-02 DIAGNOSIS — N95.2 PERIMENOPAUSAL ATROPHIC VAGINITIS: ICD-10-CM

## 2022-03-02 DIAGNOSIS — N81.11 MIDLINE CYSTOCELE: Primary | ICD-10-CM

## 2022-03-02 PROCEDURE — 99213 OFFICE O/P EST LOW 20 MIN: CPT | Performed by: OBSTETRICS & GYNECOLOGY

## 2022-03-02 NOTE — TELEPHONE ENCOUNTER
Patient has questions re: pessary(?) removal. This PAC had trouble understanding over the phone exactly, however she requested to speak with a nurse as she had questions regarding her previous appointment.

## 2022-03-02 NOTE — PROGRESS NOTES
Chief Complaint   Patient presents with   • Pessary issues       Subjective   HPI  Keely Haider is a 76 y.o. female, , who presents for follow-up evaluation after previous pessary fitting on 22. She has not been able to remove it at home and is here to get help getting it out. She felt like she was pushing it further in while trying to retrieve it. She states she is having knee surgery coming up and would like to wait until after her recovery to try again with the pessary.      Her last LMP was No LMP recorded (lmp unknown). Patient has had a hysterectomy. Patient reports problems with: none.  Partner Status: Marital Status: .  New Partners since last visit: no.  Desires STD Screening: no.    Additional OB/GYN History   Current contraception: contraceptive methods: Post menopausal status  Desires to: do not start contraception  Last Pap : over 2 years ago per patient  Last Completed Pap Smear     This patient has no relevant Health Maintenance data.        History of abnormal Pap smear: no  Last mammogram: 21  Last Completed Mammogram     This patient has no relevant Health Maintenance data.        Tobacco Usage?: No   OB History        1    Para   1    Term   1            AB        Living           SAB        IAB        Ectopic        Molar        Multiple        Live Births                    Health Maintenance   Topic Date Due   • DXA SCAN  Never done   • COLORECTAL CANCER SCREENING  Never done   • TDAP/TD VACCINES (1 - Tdap) Never done   • ZOSTER VACCINE (1 of 2) Never done   • Pneumococcal Vaccine 65+ (1 of 1 - PPSV23) Never done   • INFLUENZA VACCINE  2021   • COVID-19 Vaccine (3 - Booster for Pfizer series) 2021   • HEPATITIS C SCREENING  Never done   • ANNUAL WELLNESS VISIT  Never done   • LIPID PANEL  Never done       The additional following portions of the patient's history were reviewed and updated as appropriate: allergies, current medications,  "past family history, past medical history, past social history, past surgical history and problem list.    Review of Systems    I have reviewed and agree with the HPI, ROS, and historical information as entered above. Marcelino Serrano MD    Objective   /60   Ht 152.4 cm (60\")   Wt 64.4 kg (142 lb)   LMP  (LMP Unknown)   Breastfeeding No   BMI 27.73 kg/m²     Physical Exam  Vitals and nursing note reviewed. Exam conducted with a chaperone present.   Constitutional:       Appearance: Normal appearance.   HENT:      Head: Normocephalic and atraumatic.   Pulmonary:      Effort: Pulmonary effort is normal.   Abdominal:      General: Abdomen is flat.      Palpations: Abdomen is soft.   Genitourinary:     General: Normal vulva.      Exam position: Lithotomy position.      Labia:         Right: No rash, tenderness, lesion or injury.         Left: No rash, tenderness, lesion or injury.       Comments: Pessary in place and removed without difficulty.  No bleeding or discharge noted.  Neurological:      Mental Status: She is alert.         Assessment/Plan     Assessment     Problem List Items Addressed This Visit     Midline cystocele - Primary    Overview     Midline grade 3 cystocele;  Fit with #3 ring pessary with support.   Denies any history of urinary incontinence.  States voiding has improved with pessary.    3/2/2022.  Patient returns due to inability to remove pessary at home.  Plans to delay using pessary until after her knee surgery.         Relevant Medications    conjugated estrogens (Premarin) 0.625 MG/GM vaginal cream    History of total abdominal hysterectomy and bilateral salpingo-oophorectomy    Overview     Age 37  GUSTABO/BSO for endometriosis.          Perimenopausal atrophic vaginitis    Overview     Rx Premarin cream.  Samples given.               1. Midline grade 3 cystocele.  Patient had difficulty reaching pessary for removal.  Pessary removed without difficulty and cleaned and returned to " the patient.  2. Patient plans on delaying use of pessary until knee surgery completed.  Can return at that time.    Plan     Return in about 3 months (around 6/2/2022).          Marcelino Serrano MD  03/02/2022

## 2022-05-25 ENCOUNTER — TRANSCRIBE ORDERS (OUTPATIENT)
Dept: ADMINISTRATIVE | Facility: HOSPITAL | Age: 77
End: 2022-05-25

## 2022-05-25 DIAGNOSIS — Z12.31 VISIT FOR SCREENING MAMMOGRAM: Primary | ICD-10-CM

## 2022-05-25 DIAGNOSIS — N95.1 FEMALE CLIMACTERIC STATE: ICD-10-CM

## 2022-06-06 ENCOUNTER — APPOINTMENT (OUTPATIENT)
Dept: BONE DENSITY | Facility: HOSPITAL | Age: 77
End: 2022-06-06

## 2022-06-27 ENCOUNTER — APPOINTMENT (OUTPATIENT)
Dept: MAMMOGRAPHY | Facility: HOSPITAL | Age: 77
End: 2022-06-27

## 2022-08-17 ENCOUNTER — APPOINTMENT (OUTPATIENT)
Dept: OTHER | Facility: HOSPITAL | Age: 77
End: 2022-08-17

## 2022-08-17 ENCOUNTER — HOSPITAL ENCOUNTER (OUTPATIENT)
Dept: MAMMOGRAPHY | Facility: HOSPITAL | Age: 77
Discharge: HOME | End: 2022-08-17

## 2022-08-17 ENCOUNTER — HOSPITAL ENCOUNTER (OUTPATIENT)
Dept: BONE DENSITY | Facility: HOSPITAL | Age: 77
Discharge: HOME | End: 2022-08-17

## 2022-08-17 DIAGNOSIS — Z12.31 VISIT FOR SCREENING MAMMOGRAM: ICD-10-CM

## 2022-08-17 DIAGNOSIS — N95.1 FEMALE CLIMACTERIC STATE: ICD-10-CM

## 2022-08-17 PROCEDURE — 77063 BREAST TOMOSYNTHESIS BI: CPT | Performed by: RADIOLOGY

## 2022-08-17 PROCEDURE — 77063 BREAST TOMOSYNTHESIS BI: CPT

## 2022-08-17 PROCEDURE — 77067 SCR MAMMO BI INCL CAD: CPT

## 2022-08-17 PROCEDURE — 77080 DXA BONE DENSITY AXIAL: CPT

## 2022-08-17 PROCEDURE — 77067 SCR MAMMO BI INCL CAD: CPT | Performed by: RADIOLOGY

## 2024-03-04 ENCOUNTER — TRANSCRIBE ORDERS (OUTPATIENT)
Dept: ADMINISTRATIVE | Facility: HOSPITAL | Age: 79
End: 2024-03-04
Payer: MEDICARE

## 2024-03-04 DIAGNOSIS — Z12.31 VISIT FOR SCREENING MAMMOGRAM: Primary | ICD-10-CM

## 2024-04-05 ENCOUNTER — HOSPITAL ENCOUNTER (OUTPATIENT)
Dept: MAMMOGRAPHY | Facility: HOSPITAL | Age: 79
Discharge: HOME | End: 2024-04-05
Admitting: NURSE PRACTITIONER
Payer: MEDICARE

## 2024-04-05 DIAGNOSIS — Z12.31 VISIT FOR SCREENING MAMMOGRAM: ICD-10-CM

## 2024-04-05 PROCEDURE — 77063 BREAST TOMOSYNTHESIS BI: CPT

## 2024-04-05 PROCEDURE — 77067 SCR MAMMO BI INCL CAD: CPT

## 2024-05-22 ENCOUNTER — OFFICE VISIT (OUTPATIENT)
Dept: UROLOGY | Facility: CLINIC | Age: 79
End: 2024-05-22
Payer: MEDICARE

## 2024-05-22 VITALS
BODY MASS INDEX: 27.48 KG/M2 | SYSTOLIC BLOOD PRESSURE: 124 MMHG | HEIGHT: 60 IN | WEIGHT: 140 LBS | HEART RATE: 65 BPM | DIASTOLIC BLOOD PRESSURE: 64 MMHG | OXYGEN SATURATION: 98 %

## 2024-05-22 DIAGNOSIS — N39.41 URINARY INCONTINENCE, URGE: ICD-10-CM

## 2024-05-22 DIAGNOSIS — N32.81 OVERACTIVE BLADDER: ICD-10-CM

## 2024-05-22 DIAGNOSIS — R31.29 MICROHEMATURIA: Primary | ICD-10-CM

## 2024-05-22 LAB
BILIRUB BLD-MCNC: NEGATIVE MG/DL
CLARITY, POC: CLEAR
COLOR UR: YELLOW
EXPIRATION DATE: NORMAL
GLUCOSE UR STRIP-MCNC: NEGATIVE MG/DL
KETONES UR QL: NEGATIVE
LEUKOCYTE EST, POC: NEGATIVE
Lab: NORMAL
NITRITE UR-MCNC: NEGATIVE MG/ML
PH UR: 7 [PH] (ref 5–8)
PROT UR STRIP-MCNC: NEGATIVE MG/DL
RBC # UR STRIP: NEGATIVE /UL
SP GR UR: 1.01 (ref 1–1.03)
UROBILINOGEN UR QL: NORMAL

## 2024-05-22 RX ORDER — ROSUVASTATIN CALCIUM 5 MG/1
5 TABLET, COATED ORAL DAILY
COMMUNITY
Start: 2024-03-19

## 2024-05-22 RX ORDER — LOSARTAN POTASSIUM 100 MG/1
100 TABLET ORAL DAILY
COMMUNITY
Start: 2024-03-19

## 2024-05-22 NOTE — PROGRESS NOTES
Microscopic Hematuria Female Office Visit      Patient Name: Keely Basurto  : 1945   MRN: [id number]     Chief Complaint:  Microscopic Hematuria.     Chief Complaint   Patient presents with    asymptomatic micro hematuria   .     Referring Provider: Makayla Jackson APRN    History of Present Illness: Ms. Basurto is a 78 y.o. female with history of HTN, Seizure and Cystocele who presents with microscopic hematuria. She denies history of gross hematuria. She is a non smoker, never a smoker. She denies family history of bladder or kidney cancer. She does not have a personal history of kidney stones. Her father had kidney stones. She denies dysuria.     She reports urinary urge incontinence. She is wearing 2-3 large pads per day. She reports history of bladder tack >30 years ago.     UA 24 with blood. UA micro negative.   UA micro ; 3-6 rbc, 13-20 wbc, 4+ bacteria. She did have positive urine culture UTI at this time with Klebsiella.     UA negative today.   Subjective      Review of System: Review of Systems   Constitutional: Negative.    HENT: Negative.     Eyes: Negative.    Respiratory: Negative.     Cardiovascular: Negative.    Gastrointestinal: Negative.    Endocrine: Negative.    Genitourinary:  Positive for urgency. Negative for dysuria and frequency.   Musculoskeletal: Negative.    Skin: Negative.    Allergic/Immunologic: Negative.    Neurological: Negative.    Hematological: Negative.    Psychiatric/Behavioral: Negative.     All other systems reviewed and are negative.     I have reviewed the ROS documented by my clinical staff, I have updated appropriately and I agree. YOHANA Green    Past Medical History:  Past Medical History:   Diagnosis Date    Back pain     Cognitive impairment     mild    Constipation     Endometriosis     Hearing aid worn     History of fall 2021    fall on ice     Hyperlipidemia     Hypertension     Incontinence     Menorrhagia      Osteoarthritis of left hip     Osteoporosis     Scoliosis     Seizures     Seizures     Sleep apnea     cpap machine    Vitamin D deficiency        Past Surgical History:  Past Surgical History:   Procedure Laterality Date    ABDOMINAL HYSTERECTOMY      KNEE ARTHROPLASTY Right 09/27/2021    OOPHORECTOMY      ORIF HUMERUS FRACTURE Left 02/14/2021    Procedure: HUMERUS PROXIMAL OPEN REDUCTION INTERNAL FIXATION LEFT;  Surgeon: Timoteo Loza MD;  Location: [facility];  Service: Orthopedics;  Laterality: Left;    SHOULDER SURGERY      both shoulders following fall on ice- 2021    TUMOR REMOVAL Left     LEFT OVARIAN TUMOR REMOVED       Medications:    Current Outpatient Medications:     amLODIPine (NORVASC) 10 MG tablet, Take 1 tablet by mouth Daily., Disp: , Rfl:     aspirin 81 MG chewable tablet, Chew 1 tablet Daily., Disp: , Rfl:     bisacodyl (DULCOLAX) 10 MG suppository, Insert 1 suppository into the rectum Daily As Needed for Constipation., Disp:  , Rfl:     Calcium 500-100 MG-UNIT chewable tablet, Chew 1 tablet 2 (two) times a day., Disp: , Rfl:     Cholecalciferol (Vitamin D3) 50 MCG (2000 UT) capsule, , Disp: , Rfl:     citalopram (CeleXA) 10 MG tablet, Take 1 tablet by mouth Daily., Disp:  , Rfl:     Cyanocobalamin 500 MCG sublingual tablet, Place 500 mcg under the tongue Daily., Disp: , Rfl:     levETIRAcetam (KEPPRA) 500 MG tablet, Take 2 tablets by mouth Daily., Disp: , Rfl:     levETIRAcetam (KEPPRA) 500 MG tablet, Take 3 tablets by mouth Every Night., Disp: , Rfl:     losartan (COZAAR) 100 MG tablet, Take 1 tablet by mouth Daily., Disp: , Rfl:     rosuvastatin (CRESTOR) 5 MG tablet, Take 1 tablet by mouth Daily., Disp: , Rfl:     acetaminophen (TYLENOL) 325 MG tablet, Take 2 tablets by mouth Every 6 (Six) Hours As Needed for Mild Pain . (Patient not taking: Reported on 5/22/2024), Disp: , Rfl:     conjugated estrogens (Premarin) 0.625 MG/GM vaginal cream, Insert 1/2 gram per vagina nightly 2-3 times  per week. (Patient not taking: Reported on 5/22/2024), Disp: 1 each, Rfl: 3    Ergocalciferol (VITAMIN D2 PO), Take 1.25 mg by mouth 1 (One) Time Per Week. (Patient not taking: Reported on 5/22/2024), Disp: , Rfl:     escitalopram (LEXAPRO) 10 MG tablet, escitalopram 10 mg tablet (Patient not taking: Reported on 5/22/2024), Disp: , Rfl:     gabapentin (NEURONTIN) 300 MG capsule, Take 1 capsule by mouth Daily With Breakfast for 3 days., Disp: 3 capsule, Rfl: 0    Lactobacillus Rhamnosus, GG, ([facility] & [employer] PO), , Disp: , Rfl:     methylcellulose, Laxative, (CITRUCEL) 500 MG tablet tablet, Take 1 tablet by mouth 2 (two) times a day. (Patient not taking: Reported on 5/22/2024), Disp: , Rfl:     Mirabegron ER (Myrbetriq) 25 MG tablet sustained-release 24 hour 24 hr tablet, Take 1 tablet by mouth Daily., Disp: 30 tablet, Rfl: 0    ondansetron (ZOFRAN) 4 MG tablet, Take 1 tablet by mouth Every 6 (Six) Hours As Needed for Nausea or Vomiting. (Patient not taking: Reported on 5/22/2024), Disp:  , Rfl:     polyethylene glycol (MIRALAX) 17 g packet, Take 17 g by mouth Daily. (Patient not taking: Reported on 5/22/2024), Disp:  , Rfl:     sennosides-docusate (PERICOLACE) 8.6-50 MG per tablet, Take 2 tablets by mouth 2 (Two) Times a Day As Needed for Constipation. (Patient not taking: Reported on 5/22/2024), Disp:  , Rfl:     Allergies:  Allergies   Allergen Reactions    Sulfa Antibiotics Unknown - Low Severity       Social History:  Social History     Socioeconomic History    Marital status:    Tobacco Use    Smoking status: Never     Passive exposure: Past    Smokeless tobacco: Never   Vaping Use    Vaping status: Never Used   Substance and Sexual Activity    Alcohol use: Not Currently    Drug use: Never    Sexual activity: Not Currently     Partners: Male     Birth control/protection: Post-menopausal       Family History:  Family History   Problem Relation Age of Onset    Lung cancer Father     Breast  "cancer Neg Hx     Ovarian cancer Neg Hx        IPSS Questionnaire (AUA-7):Bladder & Bowel Symptom Questionnaire    How often do you usually urinate during the day ?   1 - About every 3-4 hours   2.   How many timed do you urinate at night?   1 - 2 times at night   3.   What is the reason that you usually urinate?   1 - Mild urge (can delay over an hour)   4.   Once you get the urge to go, how long can you     comfortably delay?   2 - 10-30 min   5.   How often do you get a sudden urge that makes you rush to the bathroom?   3 - A few times a week   6.   How often does a sudden urge to urinate result in you leaking urine or wetting pads?   3 - A few times a week   7.  In your opinion, how good is your bladder control?   3 - Poor   8.  Do you have accidental bowel leakage?   yes   9.  Do you have difficulty fully emptying your bladder?   yes   10.  Do you experience accidental leakage when performing some physical activity such as coughing, sneezing, laughing or exercise?   yes   11. Have you tried medications to help improve your symptoms?   no   12. Would you be interested in learning about a long-lasting option that may help you with your symptoms?   yes                                                                             Total Score   14     0-7 (Mild) 8-16 (Moderate) 17-28 (Severe)          Objective     Physical Exam:   Vital Signs:   Vitals:    05/22/24 1305   BP: 124/64   Pulse: 65   SpO2: 98%   Weight: 63.5 kg (140 lb)   Height: 152.4 cm (60\")   PainSc: 0-No pain     Body mass index is 27.34 kg/m².     Physical Exam  Vitals and nursing note reviewed.   Constitutional:       Appearance: Normal appearance.   HENT:      Head: Normocephalic and atraumatic.      Nose: Nose normal.      Mouth/Throat:      Mouth: Mucous membranes are moist.   Eyes:      Pupils: Pupils are equal, round, and reactive to light.   Pulmonary:      Effort: Pulmonary effort is normal.   Abdominal:      General: Abdomen is flat.      " Palpations: Abdomen is soft.   Musculoskeletal:         General: Normal range of motion.      Cervical back: Normal range of motion.   Skin:     General: Skin is warm and dry.      Capillary Refill: Capillary refill takes less than 2 seconds.   Neurological:      General: No focal deficit present.      Mental Status: She is alert.   Psychiatric:         Mood and Affect: Mood normal.       Labs:   Brief Urine Lab Results  (Last result in the past 365 days)        Color   Clarity   Blood   Leuk Est   Nitrite   Protein   CREAT   Urine HCG        05/22/24 1425 Yellow   Clear   Negative   Negative   Negative   Negative                        Lab Results   Component Value Date    GLUCOSE 99 02/17/2021    CALCIUM 8.9 02/17/2021     (L) 02/17/2021    K 4.0 02/17/2021    CO2 26.0 02/17/2021     02/17/2021    BUN 8 02/17/2021    CREATININE 0.38 (L) 02/17/2021    EGFRIFNONA >150 02/17/2021    BCR 21.1 02/17/2021    ANIONGAP 7.0 02/17/2021       Lab Results   Component Value Date    WBC 7.92 02/17/2021    HGB 8.8 (L) 02/17/2021    HCT 26.5 (L) 02/17/2021    MCV 92.3 02/17/2021     02/17/2021       Images:   Mammo Screening Digital Tomosynthesis Bilateral With CAD    Result Date: 4/8/2024  No suspicious abnormality identified.  OVERALL ASSESSMENT: ACR BI-RADS CATEGORY: 1, NEGATIVE:  Recommend continued routine annual screening mammogram.  The standard false-negative rate of mammography is between 10% and 25%. Complex patterns or increased breast density will markedly elevate the false-negative rate of mammography.   A letter, in lay terminology, with the results of this exam will be mailed to the patient.   This report was finalized on 4/8/2024 12:56 PM by Pricilla Jarvis MD.        Measures:   Tobacco:   Keely MASSEY Sb  reports that she has never smoked. She has been exposed to tobacco smoke. She has never used smokeless tobacco.          Urine Incontinence: Patient reports that she is currently  experiencing symptoms of urinary incontinence.      Assessment / Plan      Assessment/Plan:   Ms. Keely Basurto is a 78 y.o. female who presents today for asymptomatic microscopic hematuria       The patient was counseled regarding the possible etiologies, relevant work-up and diagnostic approach for microscopic hematuria, as well as the relevant risk categories as assigned by the American Urological Association guidelines.  I discussed that the definition of microscopic hematuria includes a microscopic urinalysis (not dipstick UA) positive for greater than 3 RBCs per high-powered field under microscopy.  We also discussed that any history of gross hematuria places a patient at higher risk for occult malignancies and necessitates prompt workup.  We discussed the aforementioned risk categories as assigned by the AUA, which are depicted below.  Ultimately, work-up and diagnosis of microscopic hematuria is driven by risk category.  Given the patient's age, >77 I recommend proceeding with a flexible diagnostic cystoscopy and CT urogram to assess the collecting system of the kidney and bladder.     If the patient's microscopic hematuria work-up is negative, per AUA guidelines I would recommend a repeat urinalysis via the patient's primary care provider in 12 months.  If the repeat urinalysis is positive, the patient and I will then need to have a shared decision-making conversation regarding further work-up versus observation, given the low likelihood of identifying etiology in this situation.  If patient were to develop gross hematuria in the interim, the patient would need a total re-evaluation.    She will begin Myrbetriq 25mg once daily for urge incontinence. Samples provided in office. Discussed to check blood pressure 1-2 times in the first week of beginning medication due to risk of HTN. She is agreeable.                  Diagnoses and all orders for this visit:    1. Microhematuria (Primary)  -     POC  Urinalysis Dipstick, Automated  -     CT Abdomen Pelvis With & Without Contrast; Future    2. Overactive bladder  -     Mirabegron ER (Myrbetriq) 25 MG tablet sustained-release 24 hour 24 hr tablet; Take 1 tablet by mouth Daily.  Dispense: 30 tablet; Refill: 0    3. Urinary incontinence, urge  -     Mirabegron ER (Myrbetriq) 25 MG tablet sustained-release 24 hour 24 hr tablet; Take 1 tablet by mouth Daily.  Dispense: 30 tablet; Refill: 0         Follow Up:   Return in about 2 weeks (around 6/5/2024) for Cystoscopy, CT Urogram prior .    I spent approximately 30 minutes providing clinical care for this patient; including review of patient's chart and provider documentation, face to face time spent with patient in examination room (obtaining history, performing physical exam, discussing diagnosis and management options), placing orders, and completing patient documentation.     YOHANA Green  [facility] [place]

## 2024-06-05 ENCOUNTER — HOSPITAL ENCOUNTER (OUTPATIENT)
Facility: HOSPITAL | Age: 79
Discharge: HOME | End: 2024-06-05
Admitting: NURSE PRACTITIONER
Payer: MEDICARE

## 2024-06-05 DIAGNOSIS — R31.29 MICROHEMATURIA: ICD-10-CM

## 2024-06-05 PROCEDURE — 74178 CT ABD&PLV WO CNTR FLWD CNTR: CPT

## 2024-06-05 PROCEDURE — 25510000001 IOPAMIDOL PER 1 ML: Performed by: NURSE PRACTITIONER

## 2024-06-05 RX ADMIN — IOPAMIDOL 150 ML: 755 INJECTION, SOLUTION INTRAVENOUS at 10:22

## 2024-06-10 ENCOUNTER — PROCEDURE VISIT (OUTPATIENT)
Dept: UROLOGY | Facility: CLINIC | Age: 79
End: 2024-06-10
Payer: MEDICARE

## 2024-06-10 VITALS — WEIGHT: 140 LBS | HEIGHT: 60 IN | BODY MASS INDEX: 27.48 KG/M2

## 2024-06-10 DIAGNOSIS — R31.29 MICROHEMATURIA: Primary | ICD-10-CM

## 2024-06-10 LAB
BILIRUB BLD-MCNC: NEGATIVE MG/DL
CLARITY, POC: CLEAR
COLOR UR: YELLOW
EXPIRATION DATE: NORMAL
GLUCOSE UR STRIP-MCNC: NEGATIVE MG/DL
KETONES UR QL: NEGATIVE
LEUKOCYTE EST, POC: NEGATIVE
Lab: NORMAL
NITRITE UR-MCNC: NEGATIVE MG/ML
PH UR: 6 [PH] (ref 5–8)
PROT UR STRIP-MCNC: NEGATIVE MG/DL
RBC # UR STRIP: NEGATIVE /UL
SP GR UR: 1.02 (ref 1–1.03)
UROBILINOGEN UR QL: NORMAL

## 2024-06-10 PROCEDURE — 81003 URINALYSIS AUTO W/O SCOPE: CPT | Performed by: UROLOGY

## 2024-06-10 PROCEDURE — 52000 CYSTOURETHROSCOPY: CPT | Performed by: UROLOGY

## 2024-06-10 NOTE — PROGRESS NOTES
Preprocedure diagnosis  Microhematuria    Postprocedure diagnosis  Same    Procedure  Flexible Cystourethroscopy    Attending surgeon  Housotn Tamayo MD    Anesthesia  2% lidocaine jelly intraurethrally    Complications  None    Indications  78 y.o. female undergoing a flexible cystoscopy for the above mentioned indications.      Informed consent was obtained prior to the procedure start.       Findings  Cystoscopy revealed normal bladder mucosa with NO tumors, masses, stones or trabeculations noted.      Procedure  The patient was placed in supine position and prepped and draped in sterile fashion with lidocaine jelly instilled 5 minutes pre-procedure start.  A brief timeout including available nursing staff and awake patient was performed.  The 16 Fr digital flexible cystoscope was lubricated and gently placed into the urethral meatus. The proximal and distal portions of the urethra appeared well vascularized and normal in appearance. The bladder neck was visualized and appeared well vascularized without mucosal lesions or abnormal appearance. The bladder was then entered and  completely visualized including the trigone. There were bilateral orthotopic ureteral orifices which appeared patent and effluxed clear yellow urine. The posterior wall, lateral walls, anterior wall, and dome were visualized. The cystoscope was then retroflexed and the bladder neck was further visualized and appeared normal.  The scope was gently withdrawn and the procedure terminated.  The patient tolerated the procedure well.         At this point, she has had a negative hematuria work up.  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  We will observe this for now.  We will see her back in 6 months.  We will plan on repeat renal sono in 1 year for her AML.

## 2024-11-21 ENCOUNTER — OFFICE VISIT (OUTPATIENT)
Dept: SLEEP MEDICINE | Age: 79
End: 2024-11-21
Payer: MEDICARE

## 2024-11-21 VITALS
TEMPERATURE: 98.6 F | OXYGEN SATURATION: 98 % | BODY MASS INDEX: 28.27 KG/M2 | HEIGHT: 60 IN | WEIGHT: 144 LBS | DIASTOLIC BLOOD PRESSURE: 60 MMHG | HEART RATE: 67 BPM | SYSTOLIC BLOOD PRESSURE: 120 MMHG

## 2024-11-21 DIAGNOSIS — G47.33 OSA ON CPAP: Primary | ICD-10-CM

## 2024-11-21 NOTE — PROGRESS NOTES
Keely Basurto is a 79 y.o. female.   Chief Complaint   Patient presents with    New Patient    Sleep Apnea     On CPAP       HPI     79 y.o. female seen for the above    She was originally diagnosed with obstructive sleep apnea in approximately 2012 in [facility] to her best recollection.  She was placed on CPAP therapy and found this to be effective.  At the time she was having snoring, witnessed apneas, disturbed sleep, and daytime somnolence.    With CPAP therapy her sleep has become more restful.    She comes today accompanied by her son.  Her  has passed away.  She indicates to me that as long as she is on CPAP therapy that she feels well during the day.  She has some early onset dementia and has trouble remembering details.  She is using a nasal mask.    I do not have any records from [place].  She brought a CPAP machine with her which she said was hers but apparently it is either her 's or some type of replacement machine so no viable data was obtained from it.    [place] Scale is: 7/24    The patient's relevant past medical, surgical, family, and social history reviewed and updated in Epic as appropriate.    Current medications are:   Current Outpatient Medications:     acetaminophen (TYLENOL) 325 MG tablet, Take 2 tablets by mouth Every 6 (Six) Hours As Needed for Mild Pain ., Disp: , Rfl:     amLODIPine (NORVASC) 10 MG tablet, Take 1 tablet by mouth Daily., Disp: , Rfl:     aspirin 81 MG chewable tablet, Chew 1 tablet Daily., Disp: , Rfl:     bisacodyl (DULCOLAX) 10 MG suppository, Insert 1 suppository into the rectum Daily As Needed for Constipation., Disp:  , Rfl:     Calcium 500-100 MG-UNIT chewable tablet, Chew 1 tablet 2 (two) times a day., Disp: , Rfl:     Cholecalciferol (Vitamin D3) 50 MCG (2000 UT) capsule, , Disp: , Rfl:     citalopram (CeleXA) 10 MG tablet, Take 1 tablet by mouth Daily., Disp:  , Rfl:     conjugated estrogens (Premarin) 0.625 MG/GM  "vaginal cream, Insert 1/2 gram per vagina nightly 2-3 times per week., Disp: 1 each, Rfl: 3    Cyanocobalamin 500 MCG sublingual tablet, Place 500 mcg under the tongue Daily., Disp: , Rfl:     Ergocalciferol (VITAMIN D2 PO), Take 1.25 mg by mouth 1 (One) Time Per Week., Disp: , Rfl:     escitalopram (LEXAPRO) 10 MG tablet, , Disp: , Rfl:     Lactobacillus Rhamnosus, GG, ([facility] PO), , Disp: , Rfl:     levETIRAcetam (KEPPRA) 500 MG tablet, Take 2 tablets by mouth Daily., Disp: , Rfl:     levETIRAcetam (KEPPRA) 500 MG tablet, Take 3 tablets by mouth Every Night., Disp: , Rfl:     losartan (COZAAR) 100 MG tablet, Take 1 tablet by mouth Daily., Disp: , Rfl:     methylcellulose, Laxative, (CITRUCEL) 500 MG tablet tablet, Take 1 tablet by mouth 2 (two) times a day., Disp: , Rfl:     Mirabegron ER (Myrbetriq) 25 MG tablet sustained-release 24 hour 24 hr tablet, Take 1 tablet by mouth Daily., Disp: 30 tablet, Rfl: 0    ondansetron (ZOFRAN) 4 MG tablet, Take 1 tablet by mouth Every 6 (Six) Hours As Needed for Nausea or Vomiting., Disp:  , Rfl:     polyethylene glycol (MIRALAX) 17 g packet, Take 17 g by mouth Daily., Disp:  , Rfl:     rosuvastatin (CRESTOR) 5 MG tablet, Take 1 tablet by mouth Daily., Disp: , Rfl:     sennosides-docusate (PERICOLACE) 8.6-50 MG per tablet, Take 2 tablets by mouth 2 (Two) Times a Day As Needed for Constipation., Disp:  , Rfl:     gabapentin (NEURONTIN) 300 MG capsule, Take 1 capsule by mouth Daily With Breakfast for 3 days., Disp: 3 capsule, Rfl: 0.    Review of Systems    Review of Systems  ROS documented in patient questionnaire ×14 systems.  Reviewed with patient.  Otherwise negative except as noted in HPI.    Physical Exam    Blood pressure 120/60, pulse 67, temperature 98.6 °F (37 °C), temperature source Oral, height 152.4 cm (60\"), weight 65.3 kg (144 lb), SpO2 98%, not currently breastfeeding. Body mass index is 28.12 kg/m².    Physical Exam  Vitals and nursing note " reviewed.   Constitutional:       Appearance: Normal appearance. She is well-developed.   HENT:      Head: Normocephalic and atraumatic.      Nose: Nose normal.      Mouth/Throat:      Mouth: Mucous membranes are moist.      Pharynx: Oropharynx is clear. No oropharyngeal exudate.   Eyes:      General: No scleral icterus.     Conjunctiva/sclera: Conjunctivae normal.   Neck:      Thyroid: No thyromegaly.      Trachea: No tracheal deviation.   Cardiovascular:      Rate and Rhythm: Normal rate and regular rhythm.      Heart sounds: No murmur heard.     No friction rub. No gallop.   Pulmonary:      Effort: Pulmonary effort is normal. No respiratory distress.      Breath sounds: No wheezing or rales.   Musculoskeletal:         General: No deformity. Normal range of motion.   Skin:     General: Skin is warm and dry.      Findings: No rash.   Neurological:      Mental Status: She is alert and oriented to person, place, and time.   Psychiatric:         Behavior: Behavior normal.         Thought Content: Thought content normal.         DATA:        ASSESSMENT:    Problem List Items Addressed This Visit          Pulmonary Problems    KOJO on CPAP - Primary    Relevant Orders    Polysomnography 4 or More Parameters       79-year-old female with a difficult history due to her difficulties with memory presumably due to early onset dementia and a lack of records.  She brought some equipment with her but apparently did not bring the CPAP machine that she says she is using.    She brought a CPAP machine with her that is an AutoSet device that is set at a range of 4-20 cm H2O.  We were able to interrogate this machine but there was no viable usage data on it.  She brought an example of the masks she is using and it is a nasal pillow mask.  She says she is subjectively benefiting from the treatment she is using.  I have no records from [place] documenting her sleep apnea.    PLAN:    First of all, we will need to do diagnostic  study in order to document her condition for insurance purposes.  Because of her cognitive difficulties she will not be able to do a home sleep apnea test.  I will order a PSG for diagnosis and possibly we could get some therapeutic data as well for future reference  At her convenience she will bring her device that she is currently using and we can download it    I have reviewed the results of my evaluation and impression and discussed my recommendations in detail with the patient and her son who accompanies her.    Level of service justified based on 45 minutes spent in patient care on this date of service including, but not limited to: preparing to see the patient, obtaining and/or reviewing history, performing medically appropriate examination, ordering tests/medicine/procedures, independently interpreting results, documenting clinical information in EHR, and counseling/education of patient/family/caregiver.  This is exclusive of time spent on other separate services such as performing procedures or test interpretation, if applicable.  (Level 4 45-59 minutes; Level 5 60-74 minutes)    Signed by  Eduard Wing MD    November 21, 2024      CC: Makayla Jackson APRN          Provider, No Known

## 2024-12-11 ENCOUNTER — TELEPHONE (OUTPATIENT)
Dept: UROLOGY | Facility: CLINIC | Age: 79
End: 2024-12-11

## 2024-12-11 ENCOUNTER — OFFICE VISIT (OUTPATIENT)
Dept: UROLOGY | Facility: CLINIC | Age: 79
End: 2024-12-11
Payer: MEDICARE

## 2024-12-11 VITALS — BODY MASS INDEX: 28.27 KG/M2 | HEIGHT: 60 IN | WEIGHT: 144 LBS

## 2024-12-11 DIAGNOSIS — D17.9 ANGIOMYOLIPOMA: ICD-10-CM

## 2024-12-11 DIAGNOSIS — N32.81 OVERACTIVE BLADDER: ICD-10-CM

## 2024-12-11 DIAGNOSIS — R31.29 MICROHEMATURIA: Primary | ICD-10-CM

## 2024-12-11 LAB
BILIRUB BLD-MCNC: NEGATIVE MG/DL
CLARITY, POC: CLEAR
COLOR UR: YELLOW
EXPIRATION DATE: NORMAL
GLUCOSE UR STRIP-MCNC: NEGATIVE MG/DL
KETONES UR QL: NEGATIVE
LEUKOCYTE EST, POC: NEGATIVE
Lab: NORMAL
NITRITE UR-MCNC: NEGATIVE MG/ML
PH UR: 7 [PH] (ref 5–8)
PROT UR STRIP-MCNC: NEGATIVE MG/DL
RBC # UR STRIP: NEGATIVE /UL
SP GR UR: 1 (ref 1–1.03)
UROBILINOGEN UR QL: NORMAL

## 2024-12-11 RX ORDER — MIRABEGRON 25 MG/1
25 TABLET, FILM COATED, EXTENDED RELEASE ORAL DAILY
Qty: 90 TABLET | Refills: 1 | Status: SHIPPED | OUTPATIENT
Start: 2024-12-11

## 2024-12-11 RX ORDER — MIRABEGRON 25 MG/1
25 TABLET, FILM COATED, EXTENDED RELEASE ORAL DAILY
Qty: 90 TABLET | Refills: 1 | Status: SHIPPED | OUTPATIENT
Start: 2024-12-11 | End: 2024-12-11 | Stop reason: SDUPTHER

## 2024-12-11 RX ORDER — LEVETIRACETAM 1000 MG/1
1000 TABLET ORAL 2 TIMES DAILY
COMMUNITY
Start: 2024-11-25

## 2024-12-11 NOTE — TELEPHONE ENCOUNTER
Caller: Solange Lobato    Relationship to patient: Emergency Contact    Best call back number: [phone]    Patient is needing: PTS MEDICATION (Mirabegron ER (Myrbetriq) 25 MG tablet sustained-release 24 hour 24 hr tablet) WAS SENT TO WRONG PHARMACY.    CORRECT PHARMACY IS [facility] [address] [place] [id number] PH: [phone] FAX: [phone]

## 2024-12-11 NOTE — PROGRESS NOTES
Follow Up Office Visit      Patient Name: Keely Basurto  : 1945   MRN: [id number]     Chief Complaint:    Chief Complaint   Patient presents with    Microhematuria    OAB       Referring Provider: No ref. provider found    History of Present Illness: Keely Basurto is a 79 y.o. female who presents today for follow up of microscopic hematuria. She previously had Cystoscopy with Dr. Tamayo which was negative. 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     She reports urinary urgency without urge incontinence. She denies nocturia. She has previously been given samples of Myrbetriq. She reports improvement in urinary urgency while taking Myrbetriq.     Urinalysis negative for blood or infection.   Subjective      Review of System: Review of Systems   Genitourinary:  Positive for urgency. Negative for dysuria and frequency.      I have reviewed the ROS documented by my clinical staff, I have updated appropriately and I agree. YOHANA Soto    I have reviewed and the following portions of the patient's history were updated as appropriate: past family history, past medical history, past social history, past surgical history and problem list.    Medications:     Current Outpatient Medications:     acetaminophen (TYLENOL) 325 MG tablet, Take 2 tablets by mouth Every 6 (Six) Hours As Needed for Mild Pain ., Disp: , Rfl:     amLODIPine (NORVASC) 10 MG tablet, Take 1 tablet by mouth Daily., Disp: , Rfl:     aspirin 81 MG chewable tablet, Chew 1 tablet Daily., Disp: , Rfl:     bisacodyl (DULCOLAX) 10 MG suppository, Insert 1 suppository into the rectum Daily As Needed for Constipation., Disp:  , Rfl:     Calcium 500-100 MG-UNIT chewable tablet, Chew 1 tablet 2 (two) times a day., Disp: , Rfl:     Cholecalciferol (Vitamin D3) 50 MCG (2000) capsule, , Disp: , Rfl:     citalopram (CeleXA) 10 MG tablet, Take 1 tablet by mouth Daily., Disp:  , Rfl:     conjugated estrogens (Premarin) 0.625 MG/GM  vaginal cream, Insert 1/2 gram per vagina nightly 2-3 times per week., Disp: 1 each, Rfl: 3    Cyanocobalamin 500 MCG sublingual tablet, Place 500 mcg under the tongue Daily., Disp: , Rfl:     Ergocalciferol (VITAMIN D2 PO), Take 1.25 mg by mouth 1 (One) Time Per Week., Disp: , Rfl:     Lactobacillus Rhamnosus, GG, ([facility] [employer] PO), , Disp: , Rfl:     levETIRAcetam (KEPPRA) 1000 MG tablet, Take 1 tablet by mouth 2 (Two) Times a Day., Disp: , Rfl:     levETIRAcetam (KEPPRA) 500 MG tablet, Take 1 tablet by mouth Every Night., Disp: , Rfl:     losartan (COZAAR) 100 MG tablet, Take 1 tablet by mouth Daily., Disp: , Rfl:     methylcellulose, Laxative, (CITRUCEL) 500 MG tablet tablet, Take 1 tablet by mouth 2 (two) times a day., Disp: , Rfl:     ondansetron (ZOFRAN) 4 MG tablet, Take 1 tablet by mouth Every 6 (Six) Hours As Needed for Nausea or Vomiting., Disp:  , Rfl:     polyethylene glycol (MIRALAX) 17 g packet, Take 17 g by mouth Daily., Disp:  , Rfl:     rosuvastatin (CRESTOR) 5 MG tablet, Take 1 tablet by mouth Daily., Disp: , Rfl:     sennosides-docusate (PERICOLACE) 8.6-50 MG per tablet, Take 2 tablets by mouth 2 (Two) Times a Day As Needed for Constipation., Disp:  , Rfl:     gabapentin (NEURONTIN) 300 MG capsule, Take 1 capsule by mouth Daily With Breakfast for 3 days., Disp: 3 capsule, Rfl: 0    Mirabegron ER (Myrbetriq) 25 MG tablet sustained-release 24 hour 24 hr tablet, Take 1 tablet by mouth Daily., Disp: 90 tablet, Rfl: 1    Allergies:   Allergies   Allergen Reactions    Sulfa Antibiotics Unknown - Low Severity     Bladder & Bowel Symptom Questionnaire    How often do you usually urinate during the day ?   1 - About every 3-4 hours   2.   How many timed do you urinate at night?   1 - 2 times at night   3.   What is the reason that you usually urinate?   3 - Severe urge (can delay less than 10 min)   4.   Once you get the urge to go, how long can you     comfortably delay?   2 - 10-30 min  "  5.   How often do you get a sudden urge that makes you rush to the bathroom?   2 - A few times a month   6.   How often does a sudden urge to urinate result in you leaking urine or wetting pads?   3 - A few times a week   7.  In your opinion, how good is your bladder control?   3 - Poor   8.  Do you have accidental bowel leakage?   no   9.  Do you have difficulty fully emptying your bladder?   no   10.  Do you experience accidental leakage when performing some physical activity such as coughing, sneezing, laughing or exercise?   yes   11. Have you tried medications to help improve your symptoms?   yes   12. Would you be interested in learning about a long-lasting option that may help you with your symptoms?   no                                                                             Total Score   15     0-7 (Mild) 8-16 (Moderate) 17-28 (Severe)        Objective     Physical Exam:   Vital Signs:   Vitals:    12/11/24 1055   Weight: 65.3 kg (144 lb)   Height: 152.4 cm (60\")     Body mass index is 28.12 kg/m².     Physical Exam  Vitals and nursing note reviewed.   Constitutional:       Appearance: Normal appearance.   HENT:      Head: Normocephalic and atraumatic.      Nose: Nose normal.      Mouth/Throat:      Mouth: Mucous membranes are moist.   Eyes:      Pupils: Pupils are equal, round, and reactive to light.   Pulmonary:      Effort: Pulmonary effort is normal.   Abdominal:      General: Abdomen is flat.      Palpations: Abdomen is soft.   Musculoskeletal:         General: Normal range of motion.      Cervical back: Normal range of motion.   Skin:     General: Skin is warm and dry.      Capillary Refill: Capillary refill takes less than 2 seconds.   Neurological:      General: No focal deficit present.      Mental Status: She is alert.   Psychiatric:         Mood and Affect: Mood normal.       Labs:   Brief Urine Lab Results  (Last result in the past 365 days)        Color   Clarity   Blood   Leuk Est   " Nitrite   Protein   CREAT   Urine HCG        12/11/24 1111 Yellow   Clear   Negative   Negative   Negative   Negative                        Lab Results   Component Value Date    GLUCOSE 99 02/17/2021    CALCIUM 8.9 02/17/2021     (L) 02/17/2021    K 4.0 02/17/2021    CO2 26.0 02/17/2021     02/17/2021    BUN 8 02/17/2021    CREATININE 0.38 (L) 02/17/2021    EGFRIFNONA >150 02/17/2021    BCR 21.1 02/17/2021    ANIONGAP 7.0 02/17/2021       Lab Results   Component Value Date    WBC 7.92 02/17/2021    HGB 8.8 (L) 02/17/2021    HCT 26.5 (L) 02/17/2021    MCV 92.3 02/17/2021     02/17/2021       Images:   No Images in the past 120 days found..    Measures:   Tobacco:   Keely Basurto  reports that she has never smoked. She has been exposed to tobacco smoke. She has never used smokeless tobacco.          Urine Incontinence: Patient reports that she is not currently experiencing any symptoms of urinary incontinence.      Assessment / Plan      Assessment/Plan:   79 y.o. female who presented today for follow up of overactive bladder and microscopic hematuria. We will send prescription for Myrbetriq 25mg once daily. She will follow up in 6 months with Renal US prior to assess Left AML. She is agreeable with plan of care.     Diagnoses and all orders for this visit:    1. Microhematuria (Primary)  -     POC Urinalysis Dipstick, Automated    2. Overactive bladder  -     Mirabegron ER (Myrbetriq) 25 MG tablet sustained-release 24 hour 24 hr tablet; Take 1 tablet by mouth Daily.  Dispense: 90 tablet; Refill: 1    3. Angiomyolipoma  -     US Renal Bilateral; Future       Follow Up:   Return in about 6 months (around 6/11/2025) for Renal US prior .    I spent approximately 20 minutes providing clinical care for this patient; including review of patient's chart and provider documentation, face to face time spent with patient in examination room (obtaining history, performing physical exam, discussing  diagnosis and management options), placing orders, and completing patient documentation.     YOHANA Green  [facility]

## 2024-12-16 ENCOUNTER — TELEPHONE (OUTPATIENT)
Dept: FAMILY MEDICINE CLINIC | Facility: CLINIC | Age: 79
End: 2024-12-16
Payer: MEDICARE

## 2024-12-16 NOTE — TELEPHONE ENCOUNTER
Caller: Solange Lobato    Relationship: Emergency Contact    Best call back number:  [phone] (Mobile)     Who are you requesting to speak with (clinical staff, provider,  specific staff member):       What was the call regarding:  REQUESTING A NEW PATIENT PACKET TO BE MAILED PATIENT'S APPOINTMENT IS SCHEDULED 1-22-25    LAVERNE ASIF  [address] [id number]

## 2025-01-22 ENCOUNTER — OFFICE VISIT (OUTPATIENT)
Dept: FAMILY MEDICINE CLINIC | Facility: CLINIC | Age: 80
End: 2025-01-22
Payer: MEDICARE

## 2025-01-22 ENCOUNTER — LAB (OUTPATIENT)
Dept: LAB | Facility: HOSPITAL | Age: 80
End: 2025-01-22
Payer: MEDICARE

## 2025-01-22 VITALS
SYSTOLIC BLOOD PRESSURE: 122 MMHG | DIASTOLIC BLOOD PRESSURE: 70 MMHG | OXYGEN SATURATION: 97 % | BODY MASS INDEX: 28.82 KG/M2 | HEART RATE: 89 BPM | WEIGHT: 146.8 LBS | HEIGHT: 60 IN

## 2025-01-22 DIAGNOSIS — F41.9 ANXIETY AND DEPRESSION: ICD-10-CM

## 2025-01-22 DIAGNOSIS — E53.8 B12 DEFICIENCY: ICD-10-CM

## 2025-01-22 DIAGNOSIS — F32.A ANXIETY AND DEPRESSION: ICD-10-CM

## 2025-01-22 DIAGNOSIS — E55.9 VITAMIN D DEFICIENCY: ICD-10-CM

## 2025-01-22 DIAGNOSIS — F03.A0 MILD DEMENTIA WITHOUT BEHAVIORAL DISTURBANCE, PSYCHOTIC DISTURBANCE, MOOD DISTURBANCE, OR ANXIETY, UNSPECIFIED DEMENTIA TYPE: ICD-10-CM

## 2025-01-22 DIAGNOSIS — N32.81 OAB (OVERACTIVE BLADDER): ICD-10-CM

## 2025-01-22 DIAGNOSIS — Z00.00 ENCOUNTER FOR MEDICAL EXAMINATION TO ESTABLISH CARE: ICD-10-CM

## 2025-01-22 DIAGNOSIS — I10 PRIMARY HYPERTENSION: Primary | ICD-10-CM

## 2025-01-22 DIAGNOSIS — E78.5 DYSLIPIDEMIA: ICD-10-CM

## 2025-01-22 DIAGNOSIS — Z12.31 ENCOUNTER FOR SCREENING MAMMOGRAM FOR MALIGNANT NEOPLASM OF BREAST: ICD-10-CM

## 2025-01-22 DIAGNOSIS — I10 PRIMARY HYPERTENSION: ICD-10-CM

## 2025-01-22 LAB
ALBUMIN SERPL-MCNC: 4.6 G/DL (ref 3.5–5.2)
ALBUMIN/GLOB SERPL: 1.5 G/DL
ALP SERPL-CCNC: 71 U/L (ref 39–117)
ALT SERPL W P-5'-P-CCNC: 21 U/L (ref 1–33)
ANION GAP SERPL CALCULATED.3IONS-SCNC: 9.5 MMOL/L (ref 5–15)
AST SERPL-CCNC: 26 U/L (ref 1–32)
BILIRUB SERPL-MCNC: 0.3 MG/DL (ref 0–1.2)
BUN SERPL-MCNC: 7 MG/DL (ref 8–23)
BUN/CREAT SERPL: 10.8 (ref 7–25)
CALCIUM SPEC-SCNC: 10.2 MG/DL (ref 8.6–10.5)
CHLORIDE SERPL-SCNC: 104 MMOL/L (ref 98–107)
CHOLEST SERPL-MCNC: 163 MG/DL (ref 0–200)
CO2 SERPL-SCNC: 25.5 MMOL/L (ref 22–29)
CREAT SERPL-MCNC: 0.65 MG/DL (ref 0.57–1)
DEPRECATED RDW RBC AUTO: 40 FL (ref 37–54)
EGFRCR SERPLBLD CKD-EPI 2021: 90.2 ML/MIN/1.73
ERYTHROCYTE [DISTWIDTH] IN BLOOD BY AUTOMATED COUNT: 12 % (ref 12.3–15.4)
GLOBULIN UR ELPH-MCNC: 3 GM/DL
GLUCOSE SERPL-MCNC: 75 MG/DL (ref 65–99)
HCT VFR BLD AUTO: 40 % (ref 34–46.6)
HDLC SERPL-MCNC: 59 MG/DL (ref 40–60)
HGB BLD-MCNC: 13.7 G/DL (ref 12–15.9)
LDLC SERPL CALC-MCNC: 90 MG/DL (ref 0–100)
LDLC/HDLC SERPL: 1.51 {RATIO}
MCH RBC QN AUTO: 31.3 PG (ref 26.6–33)
MCHC RBC AUTO-ENTMCNC: 34.3 G/DL (ref 31.5–35.7)
MCV RBC AUTO: 91.3 FL (ref 79–97)
PLATELET # BLD AUTO: 298 10*3/MM3 (ref 140–450)
PMV BLD AUTO: 10.5 FL (ref 6–12)
POTASSIUM SERPL-SCNC: 4.4 MMOL/L (ref 3.5–5.2)
PROT SERPL-MCNC: 7.6 G/DL (ref 6–8.5)
RBC # BLD AUTO: 4.38 10*6/MM3 (ref 3.77–5.28)
SODIUM SERPL-SCNC: 139 MMOL/L (ref 136–145)
TRIGL SERPL-MCNC: 74 MG/DL (ref 0–150)
TSH SERPL DL<=0.05 MIU/L-ACNC: 1.22 UIU/ML (ref 0.27–4.2)
VLDLC SERPL-MCNC: 14 MG/DL (ref 5–40)
WBC NRBC COR # BLD AUTO: 8.27 10*3/MM3 (ref 3.4–10.8)

## 2025-01-22 PROCEDURE — 80061 LIPID PANEL: CPT

## 2025-01-22 PROCEDURE — 99204 OFFICE O/P NEW MOD 45 MIN: CPT | Performed by: STUDENT IN AN ORGANIZED HEALTH CARE EDUCATION/TRAINING PROGRAM

## 2025-01-22 PROCEDURE — 3078F DIAST BP <80 MM HG: CPT | Performed by: STUDENT IN AN ORGANIZED HEALTH CARE EDUCATION/TRAINING PROGRAM

## 2025-01-22 PROCEDURE — 1159F MED LIST DOCD IN RCRD: CPT | Performed by: STUDENT IN AN ORGANIZED HEALTH CARE EDUCATION/TRAINING PROGRAM

## 2025-01-22 PROCEDURE — 1126F AMNT PAIN NOTED NONE PRSNT: CPT | Performed by: STUDENT IN AN ORGANIZED HEALTH CARE EDUCATION/TRAINING PROGRAM

## 2025-01-22 PROCEDURE — 82607 VITAMIN B-12: CPT

## 2025-01-22 PROCEDURE — 3074F SYST BP LT 130 MM HG: CPT | Performed by: STUDENT IN AN ORGANIZED HEALTH CARE EDUCATION/TRAINING PROGRAM

## 2025-01-22 PROCEDURE — 84443 ASSAY THYROID STIM HORMONE: CPT

## 2025-01-22 PROCEDURE — 1160F RVW MEDS BY RX/DR IN RCRD: CPT | Performed by: STUDENT IN AN ORGANIZED HEALTH CARE EDUCATION/TRAINING PROGRAM

## 2025-01-22 PROCEDURE — 82306 VITAMIN D 25 HYDROXY: CPT

## 2025-01-22 PROCEDURE — 85027 COMPLETE CBC AUTOMATED: CPT

## 2025-01-22 PROCEDURE — 80053 COMPREHEN METABOLIC PANEL: CPT

## 2025-01-22 RX ORDER — LOSARTAN POTASSIUM 100 MG/1
100 TABLET ORAL DAILY
Qty: 90 TABLET | Refills: 1 | Status: SHIPPED | OUTPATIENT
Start: 2025-01-22

## 2025-01-22 RX ORDER — ROSUVASTATIN CALCIUM 5 MG/1
5 TABLET, COATED ORAL DAILY
Qty: 90 TABLET | Refills: 1 | Status: SHIPPED | OUTPATIENT
Start: 2025-01-22

## 2025-01-22 RX ORDER — AMLODIPINE BESYLATE 10 MG/1
10 TABLET ORAL DAILY
Qty: 90 TABLET | Refills: 1 | Status: SHIPPED | OUTPATIENT
Start: 2025-01-22

## 2025-01-22 RX ORDER — CITALOPRAM HYDROBROMIDE 10 MG/1
10 TABLET ORAL DAILY
Qty: 90 TABLET | Refills: 1 | Status: SHIPPED | OUTPATIENT
Start: 2025-01-22

## 2025-01-22 RX ORDER — DONEPEZIL HYDROCHLORIDE 10 MG/1
10 TABLET, FILM COATED ORAL DAILY
COMMUNITY
Start: 2024-12-18

## 2025-01-22 NOTE — PROGRESS NOTES
New Patient Office Visit      Patient Name: Keely Basurto  : 1945   MRN: [id number]   Care Team: Patient Care Team:  Anna Babin DO as PCP - General (Internal Medicine)    Chief Complaint:    Chief Complaint   Patient presents with    Memory Loss       History of Present Illness: Keely Basurto is a 79 y.o. female with HTN, HLD, dementia, vitamin D def, seizure disorder, KOJO on CPAP, who is here today to establish care.  She presents with her daughter in law who helps with history.     She lives independently with assistance from family. She drives independently.     Seizure disorder - diagnosed in , following with Neurology, Dr. Reece. Stable since . Compliant with keppra.     Memory  impairment - following with Neurology, Dr. Reece. Compliant with Donepezil. Patient and family reports gradual decline in memory over the past few years. Increased forgetfulness during conversations, word finding difficulties and tangential. Sometimes forgets where she places objects/why she walked into a room. She feels donepezil has helped with her word finding difficulties.     unfortunately passed away in . Reports coping with this well. Depression/anxiety well controlled with celexa.     Compliant with losartan ,amlodipine and BP well controlled     Subjective      Review of Systems:   Review of Systems - See HPI    Past Medical History:   Past Medical History:   Diagnosis Date    Anxiety     Back pain     Cataract     Cognitive impairment     mild    Constipation     Endometriosis     Hearing aid worn     History of fall 2021    fall on ice     HL (hearing loss)     Hyperlipidemia     Hypertension     Incontinence     Menorrhagia     Osteoarthritis of left hip     Osteopenia     Osteoporosis     Scoliosis     Seizures     Seizures     Sleep apnea     cpap machine    Urinary tract infection     Vitamin D deficiency        Past Surgical History:   Past Surgical History:    Procedure Laterality Date    ABDOMINAL HYSTERECTOMY      KNEE ARTHROPLASTY Right 09/27/2021    OOPHORECTOMY      ORIF HUMERUS FRACTURE Left 02/14/2021    Procedure: HUMERUS PROXIMAL OPEN REDUCTION INTERNAL FIXATION LEFT;  Surgeon: Timoteo Loza MD;  Location: [facility];  Service: Orthopedics;  Laterality: Left;    SHOULDER SURGERY      both shoulders following fall on ice- 2021    TUMOR REMOVAL Left     LEFT OVARIAN TUMOR REMOVED       Family History:   Family History   Problem Relation Age of Onset    Lung cancer Father     Breast cancer Neg Hx     Ovarian cancer Neg Hx        Social History:   Social History     Socioeconomic History    Marital status:    Tobacco Use    Smoking status: Never     Passive exposure: Past    Smokeless tobacco: Never   Vaping Use    Vaping status: Never Used   Substance and Sexual Activity    Alcohol use: Not Currently    Drug use: Never    Sexual activity: Not Currently     Partners: Male     Birth control/protection: Post-menopausal       Tobacco History:   Social History     Tobacco Use   Smoking Status Never    Passive exposure: Past   Smokeless Tobacco Never       Medications:     Current Outpatient Medications:     acetaminophen (TYLENOL) 325 MG tablet, Take 2 tablets by mouth Every 6 (Six) Hours As Needed for Mild Pain ., Disp: , Rfl:     amLODIPine (NORVASC) 10 MG tablet, Take 1 tablet by mouth Daily., Disp: 90 tablet, Rfl: 1    citalopram (CeleXA) 10 MG tablet, Take 1 tablet by mouth Daily., Disp: 90 tablet, Rfl: 1    donepezil (ARICEPT) 10 MG tablet, Take 1 tablet by mouth Daily., Disp: , Rfl:     levETIRAcetam (KEPPRA) 1000 MG tablet, Take 1 tablet by mouth 2 (Two) Times a Day., Disp: , Rfl:     levETIRAcetam (KEPPRA) 500 MG tablet, Take 1 tablet by mouth Every Night., Disp: , Rfl:     losartan (COZAAR) 100 MG tablet, Take 1 tablet by mouth Daily., Disp: 90 tablet, Rfl: 1    Mirabegron ER (Myrbetriq) 25 MG tablet sustained-release 24 hour 24 hr tablet, Take  "1 tablet by mouth Daily., Disp: 90 tablet, Rfl: 1    polyethylene glycol (MIRALAX) 17 g packet, Take 17 g by mouth Daily., Disp:  , Rfl:     rosuvastatin (CRESTOR) 5 MG tablet, Take 1 tablet by mouth Daily., Disp: 90 tablet, Rfl: 1    Allergies:   Allergies   Allergen Reactions    Sulfa Antibiotics Unknown - Low Severity       Objective     Physical Exam:  Vital Signs:   Vitals:    01/22/25 1252   BP: 122/70   Pulse: 89   SpO2: 97%   Weight: 66.6 kg (146 lb 12.8 oz)   Height: 152.4 cm (60\")     Body mass index is 28.67 kg/m².     Physical Exam  Vitals reviewed.   Constitutional:       Appearance: Normal appearance.   Pulmonary:      Effort: Pulmonary effort is normal. No respiratory distress.   Neurological:      Mental Status: She is alert.   Psychiatric:         Mood and Affect: Mood normal.         Behavior: Behavior normal.         Judgment: Judgment normal.         Assessment / Plan      Assessment/Plan:   Problems Addressed This Visit  Diagnoses and all orders for this visit:    1. Primary hypertension (Primary)  -     losartan (COZAAR) 100 MG tablet; Take 1 tablet by mouth Daily.  Dispense: 90 tablet; Refill: 1  -     amLODIPine (NORVASC) 10 MG tablet; Take 1 tablet by mouth Daily.  Dispense: 90 tablet; Refill: 1  -     Comprehensive Metabolic Panel; Future  -     TSH; Future    2. Encounter for screening mammogram for malignant neoplasm of breast  -     Mammo Screening Digital Tomosynthesis Bilateral With CAD; Future    3. Encounter for medical examination to establish care  -     CBC (No Diff); Future  -     Lipid Panel; Future  -     Comprehensive Metabolic Panel; Future  -     TSH; Future  -     Vitamin B12; Future  -     Vitamin D,25-Hydroxy; Future    4. Anxiety and depression  -     citalopram (CeleXA) 10 MG tablet; Take 1 tablet by mouth Daily.  Dispense: 90 tablet; Refill: 1  -     CBC (No Diff); Future  -     Lipid Panel; Future  -     Comprehensive Metabolic Panel; Future  -     TSH; Future  -     " Vitamin B12; Future  -     Vitamin D,25-Hydroxy; Future    5. Dyslipidemia  -     rosuvastatin (CRESTOR) 5 MG tablet; Take 1 tablet by mouth Daily.  Dispense: 90 tablet; Refill: 1  -     Lipid Panel; Future    6. OAB (overactive bladder)    7. Mild dementia without behavioral disturbance, psychotic disturbance, mood disturbance, or anxiety, unspecified dementia type  -     CBC (No Diff); Future  -     Lipid Panel; Future  -     Comprehensive Metabolic Panel; Future  -     TSH; Future  -     Vitamin B12; Future  -     Vitamin D,25-Hydroxy; Future    8. Vitamin D deficiency  -     Vitamin D,25-Hydroxy; Future    9. B12 deficiency  -     Vitamin B12; Future      Discussed importance of preventative care including vaccinations, age appropriate cancer screening, routine lab work, healthy diet, and active lifestyle.    HTN - well controlled with losartan, amlodipine   KOJO, not currently using CPAP - following with sleep medicine, Dr. Wing. Has upcoming sleep study  Seizure disorder - continue keppra per neurology, Dr. Reece  Dementia - Continue Donepezil, following with neurology, Dr. Reece  HLD - continue statin. Lipid panel today.   Anxiety/depression - continue citalopram 10mg daily   OAB - continue Myrbetriq per Urology Jade MULLER   Vitamin d and B12 def - not currently taking any supplements.       Cancer screening  Mammo 4/2024 - BIRADS1 repeat annually   Cologuard negative 2023 - repeat 2026    Plan of care reviewed with patient at the conclusion of today's visit. Education was provided regarding diagnosis and management.  Patient verbalizes understanding of and agreement with management plan.      Follow Up:   Return in about 3 months (around 4/22/2025) for Medicare Wellness.          DO WILLOW Adams RD  [facility]  [address]  [facility] [id number]  Fax [phone]  Phone [phone]

## 2025-01-23 ENCOUNTER — PATIENT ROUNDING (BHMG ONLY) (OUTPATIENT)
Dept: FAMILY MEDICINE CLINIC | Facility: CLINIC | Age: 80
End: 2025-01-23
Payer: MEDICARE

## 2025-01-23 LAB
25(OH)D3 SERPL-MCNC: 35.1 NG/ML (ref 30–100)
VIT B12 BLD-MCNC: 324 PG/ML (ref 211–946)

## 2025-01-23 NOTE — TELEPHONE ENCOUNTER
Rx Refill Note  Requested Prescriptions     Pending Prescriptions Disp Refills    Citrucel 500 MG tablet tablet [Pharmacy Med Name: Citrucel 500 MG Tablet] 56 tablet 10     Sig: TAKE TWO TABLETS '1000MG' BY MOUTH ONE TIME DAILY WITH 8 OZ OF WATER      Last office visit with prescribing clinician: 1/22/2025   Last telemedicine visit with prescribing clinician: Visit date not found   Next office visit with prescribing clinician: 4/21/2025                         Would you like a call back once the refill request has been completed: [] Yes [] No    If the office needs to give you a call back, can they leave a voicemail: [] Yes [] No    Huyen Jones MA  01/23/25, 14:56 EST   yes

## 2025-01-24 RX ORDER — METHYLCELLULOSE 500 MG/1
TABLET ORAL
Qty: 56 TABLET | Refills: 10 | Status: SHIPPED | OUTPATIENT
Start: 2025-01-24

## 2025-01-28 ENCOUNTER — HOSPITAL ENCOUNTER (OUTPATIENT)
Dept: SLEEP MEDICINE | Facility: HOSPITAL | Age: 80
Discharge: HOME | End: 2025-01-28
Admitting: INTERNAL MEDICINE
Payer: MEDICARE

## 2025-01-28 VITALS
OXYGEN SATURATION: 96 % | HEART RATE: 62 BPM | SYSTOLIC BLOOD PRESSURE: 138 MMHG | WEIGHT: 146 LBS | DIASTOLIC BLOOD PRESSURE: 73 MMHG | HEIGHT: 62 IN | BODY MASS INDEX: 26.87 KG/M2

## 2025-01-28 DIAGNOSIS — G47.33 OSA ON CPAP: ICD-10-CM

## 2025-01-28 PROCEDURE — 95811 POLYSOM 6/>YRS CPAP 4/> PARM: CPT

## 2025-02-04 DIAGNOSIS — G47.33 OSA ON CPAP: Primary | ICD-10-CM

## 2025-02-18 ENCOUNTER — TELEPHONE (OUTPATIENT)
Dept: SLEEP MEDICINE | Age: 80
End: 2025-02-18
Payer: MEDICARE

## 2025-04-07 ENCOUNTER — HOSPITAL ENCOUNTER (OUTPATIENT)
Dept: MAMMOGRAPHY | Facility: HOSPITAL | Age: 80
Discharge: HOME | End: 2025-04-07
Admitting: STUDENT IN AN ORGANIZED HEALTH CARE EDUCATION/TRAINING PROGRAM
Payer: MEDICARE

## 2025-04-07 DIAGNOSIS — Z12.31 ENCOUNTER FOR SCREENING MAMMOGRAM FOR MALIGNANT NEOPLASM OF BREAST: ICD-10-CM

## 2025-04-07 PROCEDURE — 77067 SCR MAMMO BI INCL CAD: CPT

## 2025-04-07 PROCEDURE — 77063 BREAST TOMOSYNTHESIS BI: CPT

## 2025-04-08 PROCEDURE — 77063 BREAST TOMOSYNTHESIS BI: CPT | Performed by: RADIOLOGY

## 2025-04-08 PROCEDURE — 77067 SCR MAMMO BI INCL CAD: CPT | Performed by: RADIOLOGY

## 2025-04-21 ENCOUNTER — OFFICE VISIT (OUTPATIENT)
Dept: FAMILY MEDICINE CLINIC | Facility: CLINIC | Age: 80
End: 2025-04-21
Payer: MEDICARE

## 2025-04-21 VITALS
OXYGEN SATURATION: 98 % | SYSTOLIC BLOOD PRESSURE: 128 MMHG | HEART RATE: 67 BPM | DIASTOLIC BLOOD PRESSURE: 60 MMHG | BODY MASS INDEX: 26.61 KG/M2 | HEIGHT: 62 IN | WEIGHT: 144.6 LBS

## 2025-04-21 DIAGNOSIS — F41.9 ANXIETY AND DEPRESSION: ICD-10-CM

## 2025-04-21 DIAGNOSIS — F32.A ANXIETY AND DEPRESSION: ICD-10-CM

## 2025-04-21 DIAGNOSIS — Z78.0 MENOPAUSE: ICD-10-CM

## 2025-04-21 DIAGNOSIS — F03.A0 MILD DEMENTIA WITHOUT BEHAVIORAL DISTURBANCE, PSYCHOTIC DISTURBANCE, MOOD DISTURBANCE, OR ANXIETY, UNSPECIFIED DEMENTIA TYPE: ICD-10-CM

## 2025-04-21 DIAGNOSIS — Z78.0 OSTEOPENIA AFTER MENOPAUSE: ICD-10-CM

## 2025-04-21 DIAGNOSIS — Z12.31 SCREENING MAMMOGRAM FOR BREAST CANCER: ICD-10-CM

## 2025-04-21 DIAGNOSIS — M85.80 OSTEOPENIA AFTER MENOPAUSE: ICD-10-CM

## 2025-04-21 DIAGNOSIS — N32.81 OVERACTIVE BLADDER: ICD-10-CM

## 2025-04-21 DIAGNOSIS — Z13.820 ENCOUNTER FOR SCREENING FOR OSTEOPOROSIS: ICD-10-CM

## 2025-04-21 DIAGNOSIS — Z23 IMMUNIZATION DUE: ICD-10-CM

## 2025-04-21 DIAGNOSIS — N32.81 OAB (OVERACTIVE BLADDER): ICD-10-CM

## 2025-04-21 DIAGNOSIS — E55.9 VITAMIN D DEFICIENCY: ICD-10-CM

## 2025-04-21 DIAGNOSIS — E78.5 DYSLIPIDEMIA: ICD-10-CM

## 2025-04-21 DIAGNOSIS — Z00.00 MEDICARE ANNUAL WELLNESS VISIT, SUBSEQUENT: Primary | ICD-10-CM

## 2025-04-21 DIAGNOSIS — E53.8 B12 DEFICIENCY: ICD-10-CM

## 2025-04-21 DIAGNOSIS — I10 PRIMARY HYPERTENSION: ICD-10-CM

## 2025-04-21 RX ORDER — AMLODIPINE BESYLATE 10 MG/1
10 TABLET ORAL DAILY
Qty: 90 TABLET | Refills: 1 | Status: SHIPPED | OUTPATIENT
Start: 2025-04-21

## 2025-04-21 RX ORDER — CHOLECALCIFEROL (VITAMIN D3) 25 MCG
1000 TABLET ORAL DAILY
Qty: 90 TABLET | Refills: 3 | Status: SHIPPED | OUTPATIENT
Start: 2025-04-21

## 2025-04-21 RX ORDER — LOSARTAN POTASSIUM 100 MG/1
100 TABLET ORAL DAILY
Qty: 90 TABLET | Refills: 1 | Status: SHIPPED | OUTPATIENT
Start: 2025-04-21

## 2025-04-21 RX ORDER — LANOLIN ALCOHOL/MO/W.PET/CERES
1000 CREAM (GRAM) TOPICAL DAILY
Qty: 90 TABLET | Refills: 3 | Status: SHIPPED | OUTPATIENT
Start: 2025-04-21

## 2025-04-21 RX ORDER — MEMANTINE HYDROCHLORIDE 10 MG/1
10 TABLET ORAL
COMMUNITY
Start: 2025-03-03

## 2025-04-21 RX ORDER — MIRABEGRON 50 MG/1
50 TABLET, FILM COATED, EXTENDED RELEASE ORAL DAILY
Qty: 90 TABLET | Refills: 0 | Status: SHIPPED | OUTPATIENT
Start: 2025-04-21

## 2025-04-21 RX ORDER — CITALOPRAM HYDROBROMIDE 10 MG/1
10 TABLET ORAL DAILY
Qty: 90 TABLET | Refills: 1 | Status: SHIPPED | OUTPATIENT
Start: 2025-04-21

## 2025-04-21 RX ORDER — ROSUVASTATIN CALCIUM 5 MG/1
5 TABLET, COATED ORAL DAILY
Qty: 90 TABLET | Refills: 1 | Status: SHIPPED | OUTPATIENT
Start: 2025-04-21

## 2025-04-21 NOTE — PATIENT INSTRUCTIONS
You are due for adacel Tdap vaccination. (provides protection against tetanus, diptheria and whooping cough) Please  get the immunization at your local pharmacy at your earliest convenience.  Please click on the link for more information about this vaccine.    https://www.cdc.gov/vaccines/vpd/dtap-tdap-td/public/index.html    You are due for Shingrix vaccination series ( the newest shingles vaccine).  It is a two shot series spaced 2-6 months apart. Please get this vaccine series started at your earliest convenience at your local pharmacy to help avoid shingles outbreak. It is more effective than the old Zostavax vaccine and is recommended even if you have had the Zostavax vaccine in the past.  Once the Shingrix series is completed, it does not need to be repeated.   For more information, please look at the website below:  https://www.cdc.gov/vaccines/vpd/shingles/public/shingrix/index.html    You are due for RSV vaccination. (provides protection against Respiratory Syncytial Virus Infection) Please  get the immunization at your local pharmacy at your earliest convenience. This immunization is currently a one time vaccine only. Please click on the link for more information about this vaccine.   https://www.cdc.gov/rsv/vaccines/older-adults.html    You are due for a Covid 19 vaccination. (provides protection against Covid 19 Viral Infection) Please  talk to your pharmacist and get the immunization at your local pharmacy at your earliest convenience. Please click on the link for more information about this vaccine.   https://www.cdc.gov/coronavirus/2019-ncov/vaccines/stay-up-to-date.html        Medicare Wellness  Personal Prevention Plan of Service     Date of Office Visit:    Encounter Provider:  Anna Babin DO  Place of Service:  [facility]  Patient Name: Keely Basurto  :  1945    As part of the Medicare Wellness portion of your visit today, we are providing you  with this personalized preventive plan of services (PPPS). This plan is based upon recommendations of the [place] Preventive Services Task Force (USPSTF) and the Advisory Committee on Immunization Practices (ACIP).    This lists the preventive care services that should be considered, and provides dates of when you are due. Items listed as completed are up-to-date and do not require any further intervention.    Health Maintenance   Topic Date Due   • TDAP/TD VACCINES (1 - Tdap) Never done   • ZOSTER VACCINE (1 of 2) Never done   • Pneumococcal Vaccine 50+ (2 of 2 - PPSV23) 08/03/2017   • RSV Vaccine - Adults (1 - 1-dose 75+ series) Never done   • ANNUAL WELLNESS VISIT  Never done   • DXA SCAN  08/17/2024   • COVID-19 Vaccine (4 - 2024-25 season) 04/10/2025   • HEPATITIS C SCREENING  01/22/2026 (Originally 2/28/2022)   • INFLUENZA VACCINE  07/01/2025   • MAMMOGRAM  Discontinued   • COLORECTAL CANCER SCREENING  Discontinued       No orders of the defined types were placed in this encounter.      No follow-ups on file.           Acitretin Counseling:  I discussed with the patient the risks of acitretin including but not limited to hair loss, dry lips/skin/eyes, liver damage, hyperlipidemia, depression/suicidal ideation, photosensitivity. Serious rare side effects can include but are not limited to pancreatitis, pseudotumor cerebri, bony changes, clot formation/stroke/heart attack. Patient understands that alcohol is contraindicated since it can result in liver toxicity and significantly prolong the elimination of the drug by many years. Spironolactone Counseling: Patient advised regarding risks of diarrhea, abdominal pain, hyperkalemia, birth defects (for female patients), liver toxicity and renal toxicity. The patient may need blood work to monitor liver and kidney function and potassium levels while on therapy. The patient verbalized understanding of the proper use and possible adverse effects of spironolactone. All of the patient's questions and concerns were addressed. Mirvaso Counseling: Donata Ujdi is a topical medication which can decrease superficial blood flow where applied. Side effects are uncommon and include stinging, redness and allergic reactions. Sski Pregnancy And Lactation Text: This medication is Pregnancy Category D and isn't considered safe during pregnancy. It is excreted in breast milk. Albendazole Counseling:  I discussed with the patient the risks of albendazole including but not limited to cytopenia, kidney damage, nausea/vomiting and severe allergy. The patient understands that this medication is being used in an off-label manner. Hydroxychloroquine Pregnancy And Lactation Text: This medication has been shown to cause fetal harm but it isn't assigned a Pregnancy Risk Category. There are small amounts excreted in breast milk. Litfulo Pregnancy And Lactation Text: Based on animal studies, Kira Barefoot may cause embryo-fetal harm when administered to pregnant women. The medication should not be used in pregnancy. Breastfeeding is not recommended during treatment. Topical Retinoid Pregnancy And Lactation Text: This medication is Pregnancy Category C. It is unknown if this medication is excreted in breast milk. Minocycline Counseling: Patient advised regarding possible photosensitivity and discoloration of the teeth, skin, lips, tongue and gums. Patient instructed to avoid sunlight, if possible. When exposed to sunlight, patients should wear protective clothing, sunglasses, and sunscreen. The patient was instructed to call the office immediately if the following severe adverse effects occur:  hearing changes, easy bruising/bleeding, severe headache, or vision changes. The patient verbalized understanding of the proper use and possible adverse effects of minocycline. All of the patient's questions and concerns were addressed. Oral Minoxidil Counseling- I discussed with the patient the risks of oral minoxidil including but not limited to shortness of breath, swelling of the feet or ankles, dizziness, lightheadedness, unwanted hair growth and allergic reaction. The patient verbalized understanding of the proper use and possible adverse effects of oral minoxidil. All of the patient's questions and concerns were addressed. Terbinafine Counseling: Patient counseling regarding adverse effects of terbinafine including but not limited to headache, diarrhea, rash, upset stomach, liver function test abnormalities, itching, taste/smell disturbance, nausea, abdominal pain, and flatulence. There is a rare possibility of liver failure that can occur when taking terbinafine. The patient understands that a baseline LFT and kidney function test may be required. The patient verbalized understanding of the proper use and possible adverse effects of terbinafine. All of the patient's questions and concerns were addressed. Erivedge Pregnancy And Lactation Text: This medication is Pregnancy Category X and is absolutely contraindicated during pregnancy. It is unknown if it is excreted in breast milk. Cantharidin Pregnancy And Lactation Text: This medication has not been proven safe during pregnancy. It is unknown if this medication is excreted in breast milk. Vtama Pregnancy And Lactation Text: It is unknown if this medication can cause problems during pregnancy and breastfeeding. Tazorac Counseling:  Patient advised that medication is irritating and drying. Patient may need to apply sparingly and wash off after an hour before eventually leaving it on overnight. The patient verbalized understanding of the proper use and possible adverse effects of tazorac. All of the patient's questions and concerns were addressed. Clofazimine Counseling:  I discussed with the patient the risks of clofazimine including but not limited to skin and eye pigmentation, liver damage, nausea/vomiting, gastrointestinal bleeding and allergy. Terbinafine Pregnancy And Lactation Text: This medication is Pregnancy Category B and is considered safe during pregnancy. It is also excreted in breast milk and breast feeding isn't recommended. Eucrisa Pregnancy And Lactation Text: This medication has not been assigned a Pregnancy Risk Category but animal studies failed to show danger with the topical medication. It is unknown if the medication is excreted in breast milk. Doxycycline Counseling:  Patient counseled regarding possible photosensitivity and increased risk for sunburn. Patient instructed to avoid sunlight, if possible. When exposed to sunlight, patients should wear protective clothing, sunglasses, and sunscreen. The patient was instructed to call the office immediately if the following severe adverse effects occur:  hearing changes, easy bruising/bleeding, severe headache, or vision changes. The patient verbalized understanding of the proper use and possible adverse effects of doxycycline. All of the patient's questions and concerns were addressed. Topical Steroids Applications Pregnancy And Lactation Text: Most topical steroids are considered safe to use during pregnancy and lactation. Any topical steroid applied to the breast or nipple should be washed off before breastfeeding. Skyrizi Pregnancy And Lactation Text: The risk during pregnancy and breastfeeding is uncertain with this medication. Cosentyx Pregnancy And Lactation Text: This medication is Pregnancy Category B and is considered safe during pregnancy. It is unknown if this medication is excreted in breast milk. Colchicine Counseling:  Patient counseled regarding adverse effects including but not limited to stomach upset (nausea, vomiting, stomach pain, or diarrhea). Patient instructed to limit alcohol consumption while taking this medication. Colchicine may reduce blood counts especially with prolonged use. The patient understands that monitoring of kidney function and blood counts may be required, especially at baseline. The patient verbalized understanding of the proper use and possible adverse effects of colchicine. All of the patient's questions and concerns were addressed. Aklief Pregnancy And Lactation Text: It is unknown if this medication is safe to use during pregnancy. It is unknown if this medication is excreted in breast milk. Breastfeeding women should use the topical cream on the smallest area of the skin for the shortest time needed while breastfeeding. Do not apply to nipple and areola. Infliximab Counseling:  I discussed with the patient the risks of infliximab including but not limited to myelosuppression, immunosuppression, autoimmune hepatitis, demyelinating diseases, lymphoma, and serious infections. The patient understands that monitoring is required including a PPD at baseline and must alert us or the primary physician if symptoms of infection or other concerning signs are noted. Cyclosporine Pregnancy And Lactation Text: This medication is Pregnancy Category C and it isn't know if it is safe during pregnancy. This medication is excreted in breast milk. 5-Fu Counseling: 5-Fluorouracil Counseling:  I discussed with the patient the risks of 5-fluorouracil including but not limited to erythema, scaling, itching, weeping, crusting, and pain. Mirvaso Pregnancy And Lactation Text: This medication has not been assigned a Pregnancy Risk Category. It is unknown if the medication is excreted in breast milk. Fluconazole Counseling:  Patient counseled regarding adverse effects of fluconazole including but not limited to headache, diarrhea, nausea, upset stomach, liver function test abnormalities, taste disturbance, and stomach pain. There is a rare possibility of liver failure that can occur when taking fluconazole. The patient understands that monitoring of LFTs and kidney function test may be required, especially at baseline. The patient verbalized understanding of the proper use and possible adverse effects of fluconazole. All of the patient's questions and concerns were addressed. Acitretin Pregnancy And Lactation Text: This medication is Pregnancy Category X and should not be given to women who are pregnant or may become pregnant in the future. This medication is excreted in breast milk. Spironolactone Pregnancy And Lactation Text: This medication can cause feminization of the male fetus and should be avoided during pregnancy. The active metabolite is also found in breast milk. Colchicine Pregnancy And Lactation Text: This medication is Pregnancy Category C and isn't considered safe during pregnancy. It is excreted in breast milk. Thalidomide Counseling: I discussed with the patient the risks of thalidomide including but not limited to birth defects, anxiety, weakness, chest pain, dizziness, cough and severe allergy. Albendazole Pregnancy And Lactation Text: This medication is Pregnancy Category C and it isn't known if it is safe during pregnancy. It is also excreted in breast milk. Libtayo Counseling- I discussed with the patient the risks of Libtayo including but not limited to nausea, vomiting, diarrhea, and bone or muscle pain. The patient verbalized understanding of the proper use and possible adverse effects of Libtayo. All of the patient's questions and concerns were addressed. Minocycline Pregnancy And Lactation Text: This medication is Pregnancy Category D and not consider safe during pregnancy. It is also excreted in breast milk. Olumiant Counseling: I discussed with the patient the risks of Olumiant therapy including but not limited to upper respiratory tract infections, shingles, cold sores, and nausea. Live vaccines should be avoided. This medication has been linked to serious infections; higher rate of mortality; malignancy and lymphoproliferative disorders; major adverse cardiovascular events; thrombosis; gastrointestinal perforations; neutropenia; lymphopenia; anemia; liver enzyme elevations; and lipid elevations. Low Dose Naltrexone Counseling- I discussed with the patient the potential risks and side effects of low dose naltrexone including but not limited to: more vivid dreams, headaches, nausea, vomiting, abdominal pain, fatigue, dizziness, and anxiety. Qbrexza Pregnancy And Lactation Text: There is no available data on Qbrexza use in pregnant women. There is no available data on Qbrexza use in lactation. Oral Minoxidil Pregnancy And Lactation Text: This medication should only be used when clearly needed if you are pregnant, attempting to become pregnant or breast feeding. Zoryve Counseling:  I discussed with the patient that Tyrone Pritchett is not for use in the eyes, mouth or vagina. The most commonly reported side effects include diarrhea, headache, insomnia, application site pain, upper respiratory tract infections, and urinary tract infections. All of the patient's questions and concerns were addressed. Azithromycin Counseling:  I discussed with the patient the risks of azithromycin including but not limited to GI upset, allergic reaction, drug rash, diarrhea, and yeast infections. Rhofade Counseling: Rhofade is a topical medication which can decrease superficial blood flow where applied. Side effects are uncommon and include stinging, redness and allergic reactions. Quinolones Counseling:  I discussed with the patient the risks of fluoroquinolones including but not limited to GI upset, allergic reaction, drug rash, diarrhea, dizziness, photosensitivity, yeast infections, liver function test abnormalities, tendonitis/tendon rupture. Olumiant Pregnancy And Lactation Text: Based on animal studies, Roylene Albion may cause embryo-fetal harm when administered to pregnant women. The medication should not be used in pregnancy. Breastfeeding is not recommended during treatment. Tazorac Pregnancy And Lactation Text: This medication is not safe during pregnancy. It is unknown if this medication is excreted in breast milk. Stelara Counseling:  I discussed with the patient the risks of ustekinumab including but not limited to immunosuppression, malignancy, posterior leukoencephalopathy syndrome, and serious infections. The patient understands that monitoring is required including a PPD at baseline and must alert us or the primary physician if symptoms of infection or other concerning signs are noted. Topical Sulfur Applications Counseling: Topical Sulfur Counseling: Patient counseled that this medication may cause skin irritation or allergic reactions. In the event of skin irritation, the patient was advised to reduce the amount of the drug applied or use it less frequently. The patient verbalized understanding of the proper use and possible adverse effects of topical sulfur application. All of the patient's questions and concerns were addressed. Dupixent Counseling: I discussed with the patient the risks of dupilumab including but not limited to eye infection and irritation, cold sores, injection site reactions, worsening of asthma, allergic reactions and increased risk of parasitic infection. Live vaccines should be avoided while taking dupilumab. Dupilumab will also interact with certain medications such as warfarin and cyclosporine. The patient understands that monitoring is required and they must alert us or the primary physician if symptoms of infection or other concerning signs are noted. Azelaic Acid Counseling: Patient counseled that medicine may cause skin irritation and to avoid applying near the eyes. In the event of skin irritation, the patient was advised to reduce the amount of the drug applied or use it less frequently. The patient verbalized understanding of the proper use and possible adverse effects of azelaic acid. All of the patient's questions and concerns were addressed. Methotrexate Counseling:  Patient counseled regarding adverse effects of methotrexate including but not limited to nausea, vomiting, abnormalities in liver function tests. Patients may develop mouth sores, rash, diarrhea, and abnormalities in blood counts. The patient understands that monitoring is required including LFT's and blood counts. There is a rare possibility of scarring of the liver and lung problems that can occur when taking methotrexate. Persistent nausea, loss of appetite, pale stools, dark urine, cough, and shortness of breath should be reported immediately. Patient advised to discontinue methotrexate treatment at least three months before attempting to become pregnant. I discussed the need for folate supplements while taking methotrexate. These supplements can decrease side effects during methotrexate treatment. The patient verbalized understanding of the proper use and possible adverse effects of methotrexate. All of the patient's questions and concerns were addressed. Doxycycline Pregnancy And Lactation Text: This medication is Pregnancy Category D and not consider safe during pregnancy. It is also excreted in breast milk but is considered safe for shorter treatment courses. Hydroquinone Counseling:  Patient advised that medication may result in skin irritation, lightening (hypopigmentation), dryness, and burning. In the event of skin irritation, the patient was advised to reduce the amount of the drug applied or use it less frequently. Rarely, spots that are treated with hydroquinone can become darker (pseudoochronosis). Should this occur, patient instructed to stop medication and call the office. The patient verbalized understanding of the proper use and possible adverse effects of hydroquinone. All of the patient's questions and concerns were addressed. Methotrexate Pregnancy And Lactation Text: This medication is Pregnancy Category X and is known to cause fetal harm. This medication is excreted in breast milk. Dupixent Pregnancy And Lactation Text: This medication likely crosses the placenta but the risk for the fetus is uncertain. This medication is excreted in breast milk. Cimetidine Counseling:  I discussed with the patient the risks of Cimetidine including but not limited to gynecomastia, headache, diarrhea, nausea, drowsiness, arrhythmias, pancreatitis, skin rashes, psychosis, bone marrow suppression and kidney toxicity. Opzelura Counseling:  I discussed with the patient the risks of Reji Barboza including but not limited to nasopharngitis, bronchitis, ear infection, eosinophila, hives, diarrhea, folliculitis, tonsillitis, and rhinorrhea. Taken orally, this medication has been linked to serious infections; higher rate of mortality; malignancy and lymphoproliferative disorders; major adverse cardiovascular events; thrombosis; thrombocytopenia, anemia, and neutropenia; and lipid elevations. Otezla Counseling: Arloa Christiano Counseling: The side effects of Arloa Christiano were discussed with the patient, including but not limited to worsening or new depression, weight loss, diarrhea, nausea, upper respiratory tract infection, and headache. Patient instructed to call the office should any adverse effect occur. The patient verbalized understanding of the proper use and possible adverse effects of Otezla. All the patient's questions and concerns were addressed. Azelaic Acid Pregnancy And Lactation Text: This medication is considered safe during pregnancy and breast feeding. Low Dose Naltrexone Pregnancy And Lactation Text: Naltrexone is pregnancy category C. There have been no adequate and well-controlled studies in pregnant women. It should be used in pregnancy only if the potential benefit justifies the potential risk to the fetus. Limited data indicates that naltrexone is minimally excreted into breastmilk. Libtayo Pregnancy And Lactation Text: This medication is contraindicated in pregnancy and when breast feeding. Ivermectin Counseling:  Patient instructed to take medication on an empty stomach with a full glass of water. Patient informed of potential adverse effects including but not limited to nausea, diarrhea, dizziness, itching, and swelling of the extremities or lymph nodes. The patient verbalized understanding of the proper use and possible adverse effects of ivermectin. All of the patient's questions and concerns were addressed. Dapsone Counseling: I discussed with the patient the risks of dapsone including but not limited to hemolytic anemia, agranulocytosis, rashes, methemoglobinemia, kidney failure, peripheral neuropathy, headaches, GI upset, and liver toxicity. Patients who start dapsone require monitoring including baseline LFTs and weekly CBCs for the first month, then every month thereafter. The patient verbalized understanding of the proper use and possible adverse effects of dapsone. All of the patient's questions and concerns were addressed. Fluconazole Pregnancy And Lactation Text: This medication is Pregnancy Category C and it isn't know if it is safe during pregnancy. It is also excreted in breast milk. Bexarotene Counseling:  I discussed with the patient the risks of bexarotene including but not limited to hair loss, dry lips/skin/eyes, liver abnormalities, hyperlipidemia, pancreatitis, depression/suicidal ideation, photosensitivity, drug rash/allergic reactions, hypothyroidism, anemia, leukopenia, infection, cataracts, and teratogenicity. Patient understands that they will need regular blood tests to check lipid profile, liver function tests, white blood cell count, thyroid function tests and pregnancy test if applicable. Odomzo Counseling- I discussed with the patient the risks of Odomzo including but not limited to nausea, vomiting, diarrhea, constipation, weight loss, changes in the sense of taste, decreased appetite, muscle spasms, and hair loss. The patient verbalized understanding of the proper use and possible adverse effects of Odomzo. All of the patient's questions and concerns were addressed. Bexarotene Pregnancy And Lactation Text: This medication is Pregnancy Category X and should not be given to women who are pregnant or may become pregnant. This medication should not be used if you are breast feeding. Griseofulvin Counseling:  I discussed with the patient the risks of griseofulvin including but not limited to photosensitivity, cytopenia, liver damage, nausea/vomiting and severe allergy. The patient understands that this medication is best absorbed when taken with a fatty meal (e.g., ice cream or french fries). Tranexamic Acid Counseling:  Patient advised of the small risk of bleeding problems with tranexamic acid. They were also instructed to call if they developed any nausea, vomiting or diarrhea. All of the patient's questions and concerns were addressed. Azathioprine Counseling:  I discussed with the patient the risks of azathioprine including but not limited to myelosuppression, immunosuppression, hepatotoxicity, lymphoma, and infections. The patient understands that monitoring is required including baseline LFTs, Creatinine, possible TPMP genotyping and weekly CBCs for the first month and then every 2 weeks thereafter. The patient verbalized understanding of the proper use and possible adverse effects of azathioprine. All of the patient's questions and concerns were addressed. Topical Sulfur Applications Pregnancy And Lactation Text: This medication is Pregnancy Category C and has an unknown safety profile during pregnancy. It is unknown if this topical medication is excreted in breast milk. Azithromycin Pregnancy And Lactation Text: This medication is considered safe during pregnancy and is also secreted in breast milk. Niacinamide Counseling: I recommended taking niacin or niacinamide, also know as vitamin B3, twice daily. Recent evidence suggests that taking vitamin B3 (500 mg twice daily) can reduce the risk of actinic keratoses and non-melanoma skin cancers. Side effects of vitamin B3 include flushing and headache. Zyclara Counseling:  I discussed with the patient the risks of imiquimod including but not limited to erythema, scaling, itching, weeping, crusting, and pain. Patient understands that the inflammatory response to imiquimod is variable from person to person and was educated regarded proper titration schedule. If flu-like symptoms develop, patient knows to discontinue the medication and contact us. Rinvoq Counseling: I discussed with the patient the risks of Rinvoq therapy including but not limited to upper respiratory tract infections, shingles, cold sores, bronchitis, nausea, cough, fever, acne, and headache. Live vaccines should be avoided. This medication has been linked to serious infections; higher rate of mortality; malignancy and lymphoproliferative disorders; major adverse cardiovascular events; thrombosis; thrombocytopenia, anemia, and neutropenia; lipid elevations; liver enzyme elevations; and gastrointestinal perforations. Topical Clindamycin Counseling: Patient counseled that this medication may cause skin irritation or allergic reactions. In the event of skin irritation, the patient was advised to reduce the amount of the drug applied or use it less frequently. The patient verbalized understanding of the proper use and possible adverse effects of clindamycin. All of the patient's questions and concerns were addressed. Rituxan Counseling:  I discussed with the patient the risks of Rituxan infusions. Side effects can include infusion reactions, severe drug rashes including mucocutaneous reactions, reactivation of latent hepatitis and other infections and rarely progressive multifocal leukoencephalopathy. All of the patient's questions and concerns were addressed. 5-Fu Pregnancy And Lactation Text: This medication is Pregnancy Category X and contraindicated in pregnancy and in women who may become pregnant. It is unknown if this medication is excreted in breast milk. Prednisone Counseling:  I discussed with the patient the risks of prolonged use of prednisone including but not limited to weight gain, insomnia, osteoporosis, mood changes, diabetes, susceptibility to infection, glaucoma and high blood pressure. In cases where prednisone use is prolonged, patients should be monitored with blood pressure checks, serum glucose levels and an eye exam.  Additionally, the patient may need to be placed on GI prophylaxis, PCP prophylaxis, and calcium and vitamin D supplementation and/or a bisphosphonate. The patient verbalized understanding of the proper use and the possible adverse effects of prednisone. All of the patient's questions and concerns were addressed. Taltz Counseling: I discussed with the patient the risks of ixekizumab including but not limited to immunosuppression, serious infections, worsening of inflammatory bowel disease and drug reactions. The patient understands that monitoring is required including a PPD at baseline and must alert us or the primary physician if symptoms of infection or other concerning signs are noted. Rituxan Pregnancy And Lactation Text: This medication is Pregnancy Category C and it isn't know if it is safe during pregnancy. It is unknown if this medication is excreted in breast milk but similar antibodies are known to be excreted. Imiquimod Counseling:  I discussed with the patient the risks of imiquimod including but not limited to erythema, scaling, itching, weeping, crusting, and pain. Patient understands that the inflammatory response to imiquimod is variable from person to person and was educated regarded proper titration schedule. If flu-like symptoms develop, patient knows to discontinue the medication and contact us. Benzoyl Peroxide Counseling: Patient counseled that medicine may cause skin irritation and bleach clothing. In the event of skin irritation, the patient was advised to reduce the amount of the drug applied or use it less frequently. The patient verbalized understanding of the proper use and possible adverse effects of benzoyl peroxide. All of the patient's questions and concerns were addressed. Enbrel Counseling:  I discussed with the patient the risks of etanercept including but not limited to myelosuppression, immunosuppression, autoimmune hepatitis, demyelinating diseases, lymphoma, and infections. The patient understands that monitoring is required including a PPD at baseline and must alert us or the primary physician if symptoms of infection or other concerning signs are noted. Otezla Pregnancy And Lactation Text: This medication is Pregnancy Category C and it isn't known if it is safe during pregnancy. It is unknown if it is excreted in breast milk. Dapsone Pregnancy And Lactation Text: This medication is Pregnancy Category C and is not considered safe during pregnancy or breast feeding. Tremfya Counseling: I discussed with the patient the risks of guselkumab including but not limited to immunosuppression, serious infections, worsening of inflammatory bowel disease and drug reactions. The patient understands that monitoring is required including a PPD at baseline and must alert us or the primary physician if symptoms of infection or other concerning signs are noted. Opzelura Pregnancy And Lactation Text: There is insufficient data to evaluate drug-associated risk for major birth defects, miscarriage, or other adverse maternal or fetal outcomes. There is a pregnancy registry that monitors pregnancy outcomes in pregnant persons exposed to the medication during pregnancy. It is unknown if this medication is excreted in breast milk. Do not breastfeed during treatment and for about 4 weeks after the last dose. Isotretinoin Counseling: Patient should get monthly blood tests, not donate blood, not drive at night if vision affected, not share medication, and not undergo elective surgery for 6 months after tx completed. Side effects reviewed, pt to contact office should one occur. Oxybutynin Counseling:  I discussed with the patient the risks of oxybutynin including but not limited to skin rash, drowsiness, dry mouth, difficulty urinating, and blurred vision. Solaraze Counseling:  I discussed with the patient the risks of Solaraze including but not limited to erythema, scaling, itching, weeping, crusting, and pain. Griseofulvin Pregnancy And Lactation Text: This medication is Pregnancy Category X and is known to cause serious birth defects. It is unknown if this medication is excreted in breast milk but breast feeding should be avoided. Tranexamic Acid Pregnancy And Lactation Text: It is unknown if this medication is safe during pregnancy or breast feeding. Wartpeel Counseling:  I discussed with the patient the risks of Wartpeel including but not limited to erythema, scaling, itching, weeping, crusting, and pain. Rinvoq Pregnancy And Lactation Text: Based on animal studies, Rinvoq may cause embryo-fetal harm when administered to pregnant women. The medication should not be used in pregnancy. Breastfeeding is not recommended during treatment and for 6 days after the last dose. Topical Clindamycin Pregnancy And Lactation Text: This medication is Pregnancy Category B and is considered safe during pregnancy. It is unknown if it is excreted in breast milk. Niacinamide Pregnancy And Lactation Text: These medications are considered safe during pregnancy. Rifampin Counseling: I discussed with the patient the risks of rifampin including but not limited to liver damage, kidney damage, red-orange body fluids, nausea/vomiting and severe allergy. Azathioprine Pregnancy And Lactation Text: This medication is Pregnancy Category D and isn't considered safe during pregnancy. It is unknown if this medication is excreted in breast milk. Bactrim Counseling:  I discussed with the patient the risks of sulfa antibiotics including but not limited to GI upset, allergic reaction, drug rash, diarrhea, dizziness, photosensitivity, and yeast infections. Rarely, more serious reactions can occur including but not limited to aplastic anemia, agranulocytosis, methemoglobinemia, blood dyscrasias, liver or kidney failure, lung infiltrates or desquamative/blistering drug rashes. Topical Ketoconazole Counseling: Patient counseled that this medication may cause skin irritation or allergic reactions. In the event of skin irritation, the patient was advised to reduce the amount of the drug applied or use it less frequently. The patient verbalized understanding of the proper use and possible adverse effects of ketoconazole. All of the patient's questions and concerns were addressed. Cellcept Counseling:  I discussed with the patient the risks of mycophenolate mofetil including but not limited to infection/immunosuppression, GI upset, hypokalemia, hypercholesterolemia, bone marrow suppression, lymphoproliferative disorders, malignancy, GI ulceration/bleed/perforation, colitis, interstitial lung disease, kidney failure, progressive multifocal leukoencephalopathy, and birth defects. The patient understands that monitoring is required including a baseline creatinine and regular CBC testing. In addition, patient must alert us immediately if symptoms of infection or other concerning signs are noted. Bactrim Pregnancy And Lactation Text: This medication is Pregnancy Category D and is known to cause fetal risk. It is also excreted in breast milk. Drysol Counseling:  I discussed with the patient the risks of drysol/aluminum chloride including but not limited to skin rash, itching, irritation, burning. Siliq Counseling:  I discussed with the patient the risks of Siliq including but not limited to new or worsening depression, suicidal thoughts and behavior, immunosuppression, malignancy, posterior leukoencephalopathy syndrome, and serious infections. The patient understands that monitoring is required including a PPD at baseline and must alert us or the primary physician if symptoms of infection or other concerning signs are noted. There is also a special program designed to monitor depression which is required with Siliq. Picato Counseling:  I discussed with the patient the risks of Picato including but not limited to erythema, scaling, itching, weeping, crusting, and pain. Rifampin Pregnancy And Lactation Text: This medication is Pregnancy Category C and it isn't know if it is safe during pregnancy. It is also excreted in breast milk and should not be used if you are breast feeding. Adbry Counseling: I discussed with the patient the risks of tralokinumab including but not limited to eye infection and irritation, cold sores, injection site reactions, worsening of asthma, allergic reactions and increased risk of parasitic infection. Live vaccines should be avoided while taking tralokinumab. The patient understands that monitoring is required and they must alert us or the primary physician if symptoms of infection or other concerning signs are noted. Gabapentin Counseling: I discussed with the patient the risks of gabapentin including but not limited to dizziness, somnolence, fatigue and ataxia. Benzoyl Peroxide Pregnancy And Lactation Text: This medication is Pregnancy Category C. It is unknown if benzoyl peroxide is excreted in breast milk. Doxepin Counseling:  Patient advised that the medication is sedating and not to drive a car after taking this medication. Patient informed of potential adverse effects including but not limited to dry mouth, urinary retention, and blurry vision. The patient verbalized understanding of the proper use and possible adverse effects of doxepin. All of the patient's questions and concerns were addressed. Dutasteride Pregnancy And Lactation Text: This medication is absolutely contraindicated in women, especially during pregnancy and breast feeding. Feminization of male fetuses is possible if taking while pregnant. Opioid Counseling: I discussed with the patient the potential side effects of opioids including but not limited to addiction, altered mental status, and depression. I stressed avoiding alcohol, benzodiazepines, muscle relaxants and sleep aids unless specifically okayed by a physician. The patient verbalized understanding of the proper use and possible adverse effects of opioids. All of the patient's questions and concerns were addressed. They were instructed to flush the remaining pills down the toilet if they did not need them for pain. Isotretinoin Pregnancy And Lactation Text: This medication is Pregnancy Category X and is considered extremely dangerous during pregnancy. It is unknown if it is excreted in breast milk. Finasteride Counseling:  I discussed with the patient the risks of use of finasteride including but not limited to decreased libido, decreased ejaculate volume, gynecomastia, and depression. Women should not handle medication. All of the patient's questions and concerns were addressed. Doxepin Pregnancy And Lactation Text: This medication is Pregnancy Category C and it isn't known if it is safe during pregnancy. It is also excreted in breast milk and breast feeding isn't recommended. Valtrex Counseling: I discussed with the patient the risks of valacyclovir including but not limited to kidney damage, nausea, vomiting and severe allergy. The patient understands that if the infection seems to be worsening or is not improving, they are to call. Erythromycin Counseling:  I discussed with the patient the risks of erythromycin including but not limited to GI upset, allergic reaction, drug rash, diarrhea, increase in liver enzymes, and yeast infections. Nsaids Counseling: NSAID Counseling: I discussed with the patient that NSAIDs should be taken with food. Prolonged use of NSAIDs can result in the development of stomach ulcers. Patient advised to stop taking NSAIDs if abdominal pain occurs. The patient verbalized understanding of the proper use and possible adverse effects of NSAIDs. All of the patient's questions and concerns were addressed. Sotyktu Counseling:  I discussed the most common side effects of Sotyktu including: common cold, sore throat, sinus infections, cold sores, canker sores, folliculitis, and acne.  I also discussed more serious side effects of Sotyktu including but not limited to: serious allergic reactions; increased risk for infections such as TB; cancers such as lymphomas; rhabdomyolysis and elevated CPK; and elevated triglycerides and liver enzymes.   Solaraze Pregnancy And Lactation Text: This medication is Pregnancy Category B and is considered safe. There is some data to suggest avoiding during the third trimester. It is unknown if this medication is excreted in breast milk. Itraconazole Counseling:  I discussed with the patient the risks of itraconazole including but not limited to liver damage, nausea/vomiting, neuropathy, and severe allergy. The patient understands that this medication is best absorbed when taken with acidic beverages such as non-diet cola or ginger ale. The patient understands that monitoring is required including baseline LFTs and repeat LFTs at intervals. The patient understands that they are to contact us or the primary physician if concerning signs are noted. Valtrex Pregnancy And Lactation Text: this medication is Pregnancy Category B and is considered safe during pregnancy. This medication is not directly found in breast milk but it's metabolite acyclovir is present. Cibinqo Counseling: I discussed with the patient the risks of Cibinqo therapy including but not limited to common cold, nausea, headache, cold sores, increased blood CPK levels, dizziness, UTIs, fatigue, acne, and vomitting. Live vaccines should be avoided. This medication has been linked to serious infections; higher rate of mortality; malignancy and lymphoproliferative disorders; major adverse cardiovascular events; thrombosis; thrombocytopenia and lymphopenia; lipid elevations; and retinal detachment. Soolantra Counseling: I discussed with the patients the risks of topial Soolantra. This is a medicine which decreases the number of mites and inflammation in the skin. You experience burning, stinging, eye irritation or allergic reactions. Please call our office if you develop any problems from using this medication. Cephalexin Counseling: I counseled the patient regarding use of cephalexin as an antibiotic for prophylactic and/or therapeutic purposes. Cephalexin (commonly prescribed under brand name Keflex) is a cephalosporin antibiotic which is active against numerous classes of bacteria, including most skin bacteria. Side effects may include nausea, diarrhea, gastrointestinal upset, rash, hives, yeast infections, and in rare cases, hepatitis, kidney disease, seizures, fever, confusion, neurologic symptoms, and others. Patients with severe allergies to penicillin medications are cautioned that there is about a 10% incidence of cross-reactivity with cephalosporins. When possible, patients with penicillin allergies should use alternatives to cephalosporins for antibiotic therapy. Sotyktu Pregnancy And Lactation Text: There is insufficient data to evaluate whether or not Sotyktu is safe to use during pregnancy.   It is not known if Sotyktu passes into breast milk and whether or not it is safe to use when breastfeeding.   Sarecycline Counseling: Patient advised regarding possible photosensitivity and discoloration of the teeth, skin, lips, tongue and gums. Patient instructed to avoid sunlight, if possible. When exposed to sunlight, patients should wear protective clothing, sunglasses, and sunscreen. The patient was instructed to call the office immediately if the following severe adverse effects occur:  hearing changes, easy bruising/bleeding, severe headache, or vision changes. The patient verbalized understanding of the proper use and possible adverse effects of sarecycline. All of the patient's questions and concerns were addressed. Xolair Counseling:  Patient informed of potential adverse effects including but not limited to fever, muscle aches, rash and allergic reactions. The patient verbalized understanding of the proper use and possible adverse effects of Xolair. All of the patient's questions and concerns were addressed. Klisyri Counseling:  I discussed with the patient the risks of Norbert Dieter including but not limited to erythema, scaling, itching, weeping, crusting, and pain. Humira Counseling:  I discussed with the patient the risks of adalimumab including but not limited to myelosuppression, immunosuppression, autoimmune hepatitis, demyelinating diseases, lymphoma, and serious infections. The patient understands that monitoring is required including a PPD at baseline and must alert us or the primary physician if symptoms of infection or other concerning signs are noted. Adbry Pregnancy And Lactation Text: It is unknown if this medication will adversely affect pregnancy or breast feeding. Carac Counseling:  I discussed with the patient the risks of Carac including but not limited to erythema, scaling, itching, weeping, crusting, and pain. Dutasteride Counseling: Dustasteride Counseling:  I discussed with the patient the risks of use of dutasteride including but not limited to decreased libido, decreased ejaculate volume, and gynecomastia. Women who can become pregnant should not handle medication. All of the patient's questions and concerns were addressed. Finasteride Pregnancy And Lactation Text: This medication is absolutely contraindicated during pregnancy. It is unknown if it is excreted in breast milk. Klisyri Pregnancy And Lactation Text: It is unknown if this medication can harm a developing fetus or if it is excreted in breast milk. Propranolol Counseling:  I discussed with the patient the risks of propranolol including but not limited to low heart rate, low blood pressure, low blood sugar, restlessness and increased cold sensitivity. They should call the office if they experience any of these side effects. Xolair Pregnancy And Lactation Text: This medication is Pregnancy Category B and is considered safe during pregnancy. This medication is excreted in breast milk. Opioid Pregnancy And Lactation Text: These medications can lead to premature delivery and should be avoided during pregnancy. These medications are also present in breast milk in small amounts. Glycopyrrolate Counseling:  I discussed with the patient the risks of glycopyrrolate including but not limited to skin rash, drowsiness, dry mouth, difficulty urinating, and blurred vision. Erythromycin Pregnancy And Lactation Text: This medication is Pregnancy Category B and is considered safe during pregnancy. It is also excreted in breast milk. Nsaids Pregnancy And Lactation Text: These medications are considered safe up to 30 weeks gestation. It is excreted in breast milk. High Dose Vitamin A Counseling: Side effects reviewed, pt to contact office should one occur. Winlevi Counseling:  I discussed with the patient the risks of topical clascoterone including but not limited to erythema, scaling, itching, and stinging. Patient voiced their understanding. Olanzapine Counseling- I discussed with the patient the common side effects of olanzapine including but are not limited to: lack of energy, dry mouth, increased appetite, sleepiness, tremor, constipation, dizziness, changes in behavior, or restlessness. Explained that teenagers are more likely to experience headaches, abdominal pain, pain in the arms or legs, tiredness, and sleepiness. Serious side effects include but are not limited: increased risk of death in elderly patients who are confused, have memory loss, or dementia-related psychosis; hyperglycemia; increased cholesterol and triglycerides; and weight gain. High Dose Vitamin A Pregnancy And Lactation Text: High dose vitamin A therapy is contraindicated during pregnancy and breast feeding. Winlevi Pregnancy And Lactation Text: This medication is considered safe during pregnancy and breastfeeding. Detail Level: Simple Ketoconazole Counseling:   Patient counseled regarding improving absorption with orange juice. Adverse effects include but are not limited to breast enlargement, headache, diarrhea, nausea, upset stomach, liver function test abnormalities, taste disturbance, and stomach pain. There is a rare possibility of liver failure that can occur when taking ketoconazole. The patient understands that monitoring of LFTs may be required, especially at baseline. The patient verbalized understanding of the proper use and possible adverse effects of ketoconazole. All of the patient's questions and concerns were addressed. Use Enhanced Medication Counseling?: No Hydroxyzine Counseling: Patient advised that the medication is sedating and not to drive a car after taking this medication. Patient informed of potential adverse effects including but not limited to dry mouth, urinary retention, and blurry vision. The patient verbalized understanding of the proper use and possible adverse effects of hydroxyzine. All of the patient's questions and concerns were addressed. Soolantra Pregnancy And Lactation Text: This medication is Pregnancy Category C. This medication is considered safe during breast feeding. Cephalexin Pregnancy And Lactation Text: This medication is Pregnancy Category B and considered safe during pregnancy. It is also excreted in breast milk but can be used safely for shorter doses. Cibinqo Pregnancy And Lactation Text: It is unknown if this medication will adversely affect pregnancy or breast feeding. You should not take this medication if you are currently pregnant or planning a pregnancy or while breastfeeding. Metronidazole Counseling:  I discussed with the patient the risks of metronidazole including but not limited to seizures, nausea/vomiting, a metallic taste in the mouth, nausea/vomiting and severe allergy. Kun Counseling: Kathrynn [place] Counseling: I discussed with the patient the risks of Kathrynn [place] therapy including increased risk of infection, liver issues, headache, diarrhea, or cold symptoms. Live vaccines should be avoided. They were instructed to call if they have any problems. Cimzia Counseling:  I discussed with the patient the risks of Cimzia including but not limited to immunosuppression, allergic reactions and infections. The patient understands that monitoring is required including a PPD at baseline and must alert us or the primary physician if symptoms of infection or other concerning signs are noted. Protopic Counseling: Patient may experience a mild burning sensation during topical application. Protopic is not approved in children less than 3years of age. There have been case reports of hematologic and skin malignancies in patients using topical calcineurin inhibitors although causality is questionable. Topical Metronidazole Counseling: Metronidazole is a topical antibiotic medication. You may experience burning, stinging, redness, or allergic reactions. Please call our office if you develop any problems from using this medication. Cyclophosphamide Counseling:  I discussed with the patient the risks of cyclophosphamide including but not limited to hair loss, hormonal abnormalities, decreased fertility, abdominal pain, diarrhea, nausea and vomiting, bone marrow suppression and infection. The patient understands that monitoring is required while taking this medication. Simponi Counseling:  I discussed with the patient the risks of golimumab including but not limited to myelosuppression, immunosuppression, autoimmune hepatitis, demyelinating diseases, lymphoma, and serious infections. The patient understands that monitoring is required including a PPD at baseline and must alert us or the primary physician if symptoms of infection or other concerning signs are noted. Elidel Counseling: Patient may experience a mild burning sensation during topical application. Elidel is not approved in children less than 3years of age. There have been case reports of hematologic and skin malignancies in patients using topical calcineurin inhibitors although causality is questionable. Topical Metronidazole Pregnancy And Lactation Text: This medication is Pregnancy Category B and considered safe during pregnancy. It is also considered safe to use while breastfeeding. Cimzia Pregnancy And Lactation Text: This medication crosses the placenta but can be considered safe in certain situations. Cimzia may be excreted in breast milk. Birth Control Pills Counseling: Birth Control Pill Counseling: I discussed with the patient the potential side effects of OCPs including but not limited to increased risk of stroke, heart attack, thrombophlebitis, deep venous thrombosis, hepatic adenomas, breast changes, GI upset, headaches, and depression. The patient verbalized understanding of the proper use and possible adverse effects of OCPs. All of the patient's questions and concerns were addressed. Xelaniyahz Pregnancy And Lactation Text: This medication is Pregnancy Category D and is not considered safe during pregnancy. The risk during breast feeding is also uncertain. Ilumya Counseling: I discussed with the patient the risks of tildrakizumab including but not limited to immunosuppression, malignancy, posterior leukoencephalopathy syndrome, and serious infections. The patient understands that monitoring is required including a PPD at baseline and must alert us or the primary physician if symptoms of infection or other concerning signs are noted. Minoxidil Counseling: Minoxidil is a topical medication which can increase blood flow where it is applied. It is uncertain how this medication increases hair growth. Side effects are uncommon and include stinging and allergic reactions. Propranolol Pregnancy And Lactation Text: This medication is Pregnancy Category C and it isn't known if it is safe during pregnancy. It is excreted in breast milk. Calcipotriene Counseling:  I discussed with the patient the risks of calcipotriene including but not limited to erythema, scaling, itching, and irritation. Glycopyrrolate Pregnancy And Lactation Text: This medication is Pregnancy Category B and is considered safe during pregnancy. It is unknown if it is excreted breast milk. Erivedge Counseling- I discussed with the patient the risks of Erivedge including but not limited to nausea, vomiting, diarrhea, constipation, weight loss, changes in the sense of taste, decreased appetite, muscle spasms, and hair loss. The patient verbalized understanding of the proper use and possible adverse effects of Erivedge. All of the patient's questions and concerns were addressed. SSKI Counseling:  I discussed with the patient the risks of SSKI including but not limited to thyroid abnormalities, metallic taste, GI upset, fever, headache, acne, arthralgias, paraesthesias, lymphadenopathy, easy bleeding, arrhythmias, and allergic reaction. Calcipotriene Pregnancy And Lactation Text: The use of this medication during pregnancy or lactation is not recommended as there is insufficient data. Topical Retinoid counseling:  Patient advised to apply a pea-sized amount only at bedtime and wait 30 minutes after washing their face before applying. If too drying, patient may add a non-comedogenic moisturizer. The patient verbalized understanding of the proper use and possible adverse effects of retinoids. All of the patient's questions and concerns were addressed. Ketoconazole Pregnancy And Lactation Text: This medication is Pregnancy Category C and it isn't know if it is safe during pregnancy. It is also excreted in breast milk and breast feeding isn't recommended. Litfulo Counseling: I discussed with the patient the risks of Litfulo therapy including but not limited to upper respiratory tract infections, shingles, cold sores, and nausea. Live vaccines should be avoided. This medication has been linked to serious infections; higher rate of mortality; malignancy and lymphoproliferative disorders; major adverse cardiovascular events; thrombosis; gastrointestinal perforations; neutropenia; lymphopenia; anemia; liver enzyme elevations; and lipid elevations. Hydroxychloroquine Counseling:  I discussed with the patient that a baseline ophthalmologic exam is needed at the start of therapy and every year thereafter while on therapy. A CBC may also be warranted for monitoring. The side effects of this medication were discussed with the patient, including but not limited to agranulocytosis, aplastic anemia, seizures, rashes, retinopathy, and liver toxicity. Patient instructed to call the office should any adverse effect occur. The patient verbalized understanding of the proper use and possible adverse effects of Plaquenil. All the patient's questions and concerns were addressed. Metronidazole Pregnancy And Lactation Text: This medication is Pregnancy Category B and considered safe during pregnancy. It is also excreted in breast milk. VTAMA Counseling: I discussed with the patient that Maureen Hoyt is not for use in the eyes, mouth or mouth. They should call the office if they develop any signs of allergic reactions to Maureen Hoyt. The patient verbalized understanding of the proper use and possible adverse effects of VTAMA. All of the patient's questions and concerns were addressed. Hydroxyzine Pregnancy And Lactation Text: This medication is not safe during pregnancy and should not be taken. It is also excreted in breast milk and breast feeding isn't recommended. Olanzapine Pregnancy And Lactation Text: This medication is pregnancy category C. There are no adequate and well controlled trials with olanzapine in pregnant females. Olanzapine should be used during pregnancy only if the potential benefit justifies the potential risk to the fetus. In a study in lactating healthy women, olanzapine was excreted in breast milk. It is recommended that women taking olanzapine should not breast feed. Protopic Pregnancy And Lactation Text: This medication is Pregnancy Category C. It is unknown if this medication is excreted in breast milk when applied topically. Tetracycline Counseling: Patient counseled regarding possible photosensitivity and increased risk for sunburn. Patient instructed to avoid sunlight, if possible. When exposed to sunlight, patients should wear protective clothing, sunglasses, and sunscreen. The patient was instructed to call the office immediately if the following severe adverse effects occur:  hearing changes, easy bruising/bleeding, severe headache, or vision changes. The patient verbalized understanding of the proper use and possible adverse effects of tetracycline. All of the patient's questions and concerns were addressed. Patient understands to avoid pregnancy while on therapy due to potential birth defects. Cyclophosphamide Pregnancy And Lactation Text: This medication is Pregnancy Category D and it isn't considered safe during pregnancy. This medication is excreted in breast milk. Clindamycin Counseling: I counseled the patient regarding use of clindamycin as an antibiotic for prophylactic and/or therapeutic purposes. Clindamycin is active against numerous classes of bacteria, including skin bacteria. Side effects may include nausea, diarrhea, gastrointestinal upset, rash, hives, yeast infections, and in rare cases, colitis. Arava Counseling:  Patient counseled regarding adverse effects of Arava including but not limited to nausea, vomiting, abnormalities in liver function tests. Patients may develop mouth sores, rash, diarrhea, and abnormalities in blood counts. The patient understands that monitoring is required including LFTs and blood counts. There is a rare possibility of scarring of the liver and lung problems that can occur when taking methotrexate. Persistent nausea, loss of appetite, pale stools, dark urine, cough, and shortness of breath should be reported immediately. Patient advised to discontinue Arava treatment and consult with a physician prior to attempting conception. The patient will have to undergo a treatment to eliminate Arava from the body prior to conception. Topical Steroids Counseling: I discussed with the patient that prolonged use of topical steroids can result in the increased appearance of superficial blood vessels (telangiectasias), lightening (hypopigmentation) and thinning of the skin (atrophy). Patient understands to avoid using high potency steroids in skin folds, the groin or the face. The patient verbalized understanding of the proper use and possible adverse effects of topical steroids. All of the patient's questions and concerns were addressed. Cyclosporine Counseling:  I discussed with the patient the risks of cyclosporine including but not limited to hypertension, gingival hyperplasia,myelosuppression, immunosuppression, liver damage, kidney damage, neurotoxicity, lymphoma, and serious infections. The patient understands that monitoring is required including baseline blood pressure, CBC, CMP, lipid panel and uric acid, and then 1-2 times monthly CMP and blood pressure. Skyrizi Counseling: I discussed with the patient the risks of risankizumab-rzaa including but not limited to immunosuppression, and serious infections. The patient understands that monitoring is required including a PPD at baseline and must alert us or the primary physician if symptoms of infection or other concerning signs are noted. Clindamycin Pregnancy And Lactation Text: This medication can be used in pregnancy if certain situations. Clindamycin is also present in breast milk. Eucrisa Counseling: Patient may experience a mild burning sensation during topical application. Carey Diaz is not approved in children less than 3years of age. Qbrexza Counseling:  I discussed with the patient the risks of Niharika Loft including but not limited to headache, mydriasis, blurred vision, dry eyes, nasal dryness, dry mouth, dry throat, dry skin, urinary hesitation, and constipation. Local skin reactions including erythema, burning, stinging, and itching can also occur. Aklief counseling:  Patient advised to apply a pea-sized amount only at bedtime and wait 30 minutes after washing their face before applying. If too drying, patient may add a non-comedogenic moisturizer. The most commonly reported side effects including irritation, redness, scaling, dryness, stinging, burning, itching, and increased risk of sunburn. The patient verbalized understanding of the proper use and possible adverse effects of retinoids. All of the patient's questions and concerns were addressed. Cosentyx Counseling:  I discussed with the patient the risks of Cosentyx including but not limited to worsening of Crohn's disease, immunosuppression, allergic reactions and infections. The patient understands that monitoring is required including a PPD at baseline and must alert us or the primary physician if symptoms of infection or other concerning signs are noted. Cantharidin Counseling:  I discussed with the patient the risks of Cantharidin including but not limited to pain, redness, burning, itching, and blistering. Birth Control Pills Pregnancy And Lactation Text: This medication should be avoided if pregnant and for the first 30 days post-partum.

## 2025-04-21 NOTE — LETTER
[facility]  Vaccine Consent Form    Patient Name:  Keely Basurto  Patient :  1945     Vaccine(s) Ordered    Pneumococcal Conjugate Vaccine 20-Valent (PCV20)        Screening Checklist  The following questions should be completed prior to vaccination. If you answer “yes” to any question, it does not necessarily mean you should not be vaccinated. It just means we may need to clarify or ask more questions. If a question is unclear, please ask your healthcare provider to explain it.    Yes No   Any fever or moderate to severe illness today (mild illness and/or antibiotic treatment are not contraindications)?     Do you have a history of a serious reaction to any previous vaccinations, such as anaphylaxis, encephalopathy within 7 days, Guillain-Haines syndrome within 6 weeks, seizure?     Have you received any live vaccine(s) (e.g MMR, LENNY) or any other vaccines in the last month (to ensure duplicate doses aren't given)?     Do you have an anaphylactic allergy to latex (DTaP, DTaP-IPV, Hep A, Hep B, MenB, RV, Td, Tdap), baker’s yeast (Hep B, HPV), polysorbates (RSV, nirsevimab, PCV 20, Rotavirrus, Tdap, Shingrix), or gelatin (LENNY, MMR)?     Do you have an anaphylactic allergy to neomycin (Rabies, LENNY, MMR, IPV, Hep A), polymyxin B (IPV), or streptomycin (IPV)?      Any cancer, leukemia, AIDS, or other immune system disorder? (LENNY, MMR, RV)     Do you have a parent, brother, or sister with an immune system problem (if immune competence of vaccine recipient clinically verified, can proceed)? (MMR, LENNY)     Any recent steroid treatments for >2 weeks, chemotherapy, or radiation treatment? (LENNY, MMR)     Have you received antibody-containing blood transfusions or IVIG in the past 11 months (recommended interval is dependent on product)? (MMR, LENNY)     Have you taken antiviral drugs (acyclovir, famciclovir, valacyclovir for LENNY) in the last 24 or 48 hours, respectively?      Are you pregnant or planning to  "become pregnant within 1 month? (LENNY, MMR, HPV, IPV, MenB, Abrexvy; For Hep B- refer to Engerix-B; For RSV - Abrysvo is indicated for 32-36 weeks of pregnancy from September to January)     For infants, have you ever been told your child has had intussusception or a medical emergency involving obstruction of the intestine (Rotavirus)? If not for an infant, can skip this question.         *Ordering Physicians/APC should be consulted if \"yes\" is checked by the patient or guardian above.  I have received, read, and understand the Vaccine Information Statement (VIS) for each vaccine ordered.  I have considered my or my child's health status as well as the health status of my close contacts.  I have taken the opportunity to discuss my vaccine questions with my or my child's health care provider.   I have requested that the ordered vaccine(s) be given to me or my child.  I understand the benefits and risks of the vaccines.  I understand that I should remain in the clinic for 15 minutes after receiving the vaccine(s).  _________________________________________________________  Signature of Patient or Parent/Legal Guardian ____________________  Date     "

## 2025-04-21 NOTE — PROGRESS NOTES
Subjective   The ABCs of the Annual Wellness Visit  Medicare Wellness Visit      Keely Basurto is a 79 y.o. patient with HTN, HLD, dementia, vitamin D def, seizure disorder, KOJO on CPAP who presents for a Medicare Wellness Visit.      She lives independently with assistance from family. She drives independently.     The following portions of the patient's history were reviewed and   updated as appropriate: allergies, current medications, past family history, past medical history, past social history, past surgical history, and problem list.    Compared to one year ago, the patient's physical   health is better.  Compared to one year ago, the patient's mental   health is the same.    Recent Hospitalizations:  She was not admitted to the hospital during the last year.     Current Medical Providers:  Patient Care Team:  Anna Babin DO as PCP - General (Internal Medicine)    Outpatient Medications Prior to Visit   Medication Sig Dispense Refill    Citrucel 500 MG tablet tablet TAKE TWO TABLETS '1000MG' BY MOUTH ONE TIME DAILY WITH 8 OZ OF WATER 56 tablet 10    donepezil (ARICEPT) 10 MG tablet Take 1 tablet by mouth Daily.      levETIRAcetam (KEPPRA) 1000 MG tablet Take 1 tablet by mouth 2 (Two) Times a Day.      levETIRAcetam (KEPPRA) 500 MG tablet Take 1 tablet by mouth Every Night.      memantine (NAMENDA) 10 MG tablet Take 1 tablet by mouth.      polyethylene glycol (MIRALAX) 17 g packet Take 17 g by mouth Daily.      amLODIPine (NORVASC) 10 MG tablet Take 1 tablet by mouth Daily. 90 tablet 1    citalopram (CeleXA) 10 MG tablet Take 1 tablet by mouth Daily. 90 tablet 1    losartan (COZAAR) 100 MG tablet Take 1 tablet by mouth Daily. 90 tablet 1    Mirabegron ER (Myrbetriq) 25 MG tablet sustained-release 24 hour 24 hr tablet Take 1 tablet by mouth Daily. 90 tablet 1    rosuvastatin (CRESTOR) 5 MG tablet Take 1 tablet by mouth Daily. 90 tablet 1    acetaminophen (TYLENOL) 325 MG tablet Take 2 tablets by  "mouth Every 6 (Six) Hours As Needed for Mild Pain .       No facility-administered medications prior to visit.     No opioid medication identified on active medication list. I have reviewed chart for other potential  high risk medication/s and harmful drug interactions in the elderly.      Aspirin is not on active medication list.  Aspirin use is not indicated based on review of current medical condition/s. Risk of harm outweighs potential benefits.  .    Patient Active Problem List   Diagnosis    Humerus fracture-bilateral    Idiopathic scoliosis of thoracolumbar spine    Left hip pain    Essential hypertension    Seizure    Neck pain    Midline cystocele    History of total abdominal hysterectomy and bilateral salpingo-oophorectomy    Perimenopausal atrophic vaginitis    KOJO on CPAP     Advance Care Planning Advance Directive is on file.  ACP discussion was held with the patient during this visit. Patient has an advance directive in EMR which is still valid.             Objective   Vitals:    04/21/25 1410   BP: 128/60   Pulse: 67   SpO2: 98%   Weight: 65.6 kg (144 lb 9.6 oz)   Height: 157.5 cm (62\")   PainSc: 0-No pain       Estimated body mass index is 26.45 kg/m² as calculated from the following:    Height as of this encounter: 157.5 cm (62\").    Weight as of this encounter: 65.6 kg (144 lb 9.6 oz).    BMI is >= 25 and <30. (Overweight) The following options were offered after discussion;: exercise counseling/recommendations and nutrition counseling/recommendations           Does the patient have evidence of cognitive impairment? Yes  Lab Results   Component Value Date    TRIG 74 01/22/2025    HDL 59 01/22/2025    LDL 90 01/22/2025    VLDL 14 01/22/2025                                                                                               Health  Risk Assessment    Smoking Status:  Social History     Tobacco Use   Smoking Status Never    Passive exposure: Past   Smokeless Tobacco Never     Alcohol " Consumption:  Social History     Substance and Sexual Activity   Alcohol Use Not Currently       Fall Risk Screen  STEADI Fall Risk Assessment was completed, and patient is at LOW risk for falls.Assessment completed on:2025    Depression Screening   Little interest or pleasure in doing things? Not at all   Feeling down, depressed, or hopeless? Not at all   PHQ-2 Total Score 0      Health Habits and Functional and Cognitive Screenin/21/2025     2:15 PM   Functional & Cognitive Status   Do you have difficulty preparing food and eating? No   Do you have difficulty bathing yourself, getting dressed or grooming yourself? No   Do you have difficulty using the toilet? No   Do you have difficulty moving around from place to place? No   Do you have trouble with steps or getting out of a bed or a chair? Yes   Current Diet Well Balanced Diet   Dental Exam Up to date   Eye Exam Up to date   Exercise (times per week) 0 times per week   Current Exercises Include No Regular Exercise   Do you need help using the phone?  No   Are you deaf or do you have serious difficulty hearing?  No   Do you need help to go to places out of walking distance? No   Do you need help shopping? No   Do you need help preparing meals?  No   Do you need help with housework?  No   Do you need help with laundry? No   Do you need help taking your medications? No   Do you need help managing money? No   Do you ever drive or ride in a car without wearing a seat belt? No   Have you felt unusual stress, anger or loneliness in the last month? Yes   Who do you live with? Alone   If you need help, do you have trouble finding someone available to you? No   Have you been bothered in the last four weeks by sexual problems? No   Do you have difficulty concentrating, remembering or making decisions? Yes           Age-appropriate Screening Schedule:  Refer to the list below for future screening recommendations based on patient's age, sex and/or medical  conditions. Orders for these recommended tests are listed in the plan section. The patient has been provided with a written plan.    Health Maintenance List  Health Maintenance   Topic Date Due    TDAP/TD VACCINES (1 - Tdap) Never done    ZOSTER VACCINE (1 of 2) Never done    Pneumococcal Vaccine 50+ (2 of 2 - PPSV23) 08/03/2017    RSV Vaccine - Adults (1 - 1-dose 75+ series) Never done    DXA SCAN  08/17/2024    COVID-19 Vaccine (4 - 2024-25 season) 04/10/2025    HEPATITIS C SCREENING  01/22/2026 (Originally 2/28/2022)    INFLUENZA VACCINE  07/01/2025    ANNUAL WELLNESS VISIT  04/21/2026    MAMMOGRAM  Discontinued    COLORECTAL CANCER SCREENING  Discontinued                                                                                                                                                CMS Preventative Services Quick Reference  Risk Factors Identified During Encounter  Immunizations Discussed/Encouraged: Tdap, Prevnar 20 (Pneumococcal 20-valent conjugate), Shingrix, COVID19, and RSV (Respiratory Syncytial Virus)  Dental Screening Recommended  Vision Screening Recommended    The above risks/problems have been discussed with the patient.  Pertinent information has been shared with the patient in the After Visit Summary.  An After Visit Summary and PPPS were made available to the patient.    Follow Up:   Next Medicare Wellness visit to be scheduled in 1 year.     Assessment & Plan  Medicare annual wellness visit, subsequent         Encounter for screening for osteoporosis    Orders:    DEXA Bone Density Axial    Menopause    Orders:    DEXA Bone Density Axial    Immunization due    Orders:    Pneumococcal Conjugate Vaccine 20-Valent (PCV20)    Screening mammogram for breast cancer         Primary hypertension      Orders:    losartan (COZAAR) 100 MG tablet; Take 1 tablet by mouth Daily.    amLODIPine (NORVASC) 10 MG tablet; Take 1 tablet by mouth Daily.    Anxiety and depression      Orders:    citalopram  (CeleXA) 10 MG tablet; Take 1 tablet by mouth Daily.    Dyslipidemia    Orders:    rosuvastatin (CRESTOR) 5 MG tablet; Take 1 tablet by mouth Daily.    OAB (overactive bladder)    Orders:    Mirabegron ER (Myrbetriq) 50 MG tablet sustained-release 24 hour 24 hr tablet; Take 50 mg by mouth Daily.    Mild dementia without behavioral disturbance, psychotic disturbance, mood disturbance, or anxiety, unspecified dementia type           Vitamin D deficiency    Orders:    cholecalciferol (Vitamin D, Cholecalciferol,) 25 MCG (1000 UT) tablet; Take 1 tablet by mouth Daily.    B12 deficiency    Orders:    vitamin B-12 (CYANOCOBALAMIN) 1000 MCG tablet; Take 1 tablet by mouth Daily.    Osteopenia after menopause    Orders:    DEXA Bone Density Axial    cholecalciferol (Vitamin D, Cholecalciferol,) 25 MCG (1000 UT) tablet; Take 1 tablet by mouth Daily.    Overactive bladder         Discussed importance of age appropriate vaccinations, cancer screening, routine labs. Discussed importance of up to date ACP. Discussed importance of fall precautions. Avoid clutter in the home and loose rugs. Always use handrail when available. Discussed the importance of medication compliance and follow up with medical providers. Discussed importance of healthy diet and active lifestyle as able. Smoking cessation not indicated. Discussed importance of up to date eye exams and dental exams.    Vaccines  PCV20 today  Encouraged tdap, shingrix, RSV and covid booster at pharmacy     HTN - well controlled with losartan, amlodipine   KOJO on CPAP - following with sleep medicine, Dr. Wing.   Seizure disorder - continue keppra per neurology, Dr. Reece  Dementia - Continue Donepezil and Memantine, following with neurology, Dr. Reece  HLD - continue statin  Anxiety/depression - continue citalopram 10mg daily   OAB - continue Myrbetriq per Urology Jade Ndiaye APRN. She reports experiencing more urinary incontinence episodes lately and is interested in  increasing her dose. Agreeable to this - will increase to 50mg daily and she will plan to follow up with Urology as currently scheduled 06/2025   Vitamin d and B12 def - not currently taking any supplements. Will restart b12 and vit D supplement       Cancer screening  Mammo 4/2025 - BIRADS1 repeat annually   Cologuard negative 2023 - repeat 2026    Follow Up:   Return in about 6 months (around 10/21/2025) for recheck HTN, mood, memory .

## 2025-06-02 ENCOUNTER — HOSPITAL ENCOUNTER (OUTPATIENT)
Dept: BONE DENSITY | Facility: HOSPITAL | Age: 80
Discharge: HOME | End: 2025-06-02
Admitting: STUDENT IN AN ORGANIZED HEALTH CARE EDUCATION/TRAINING PROGRAM
Payer: MEDICARE

## 2025-06-02 PROCEDURE — 77080 DXA BONE DENSITY AXIAL: CPT

## 2025-06-20 ENCOUNTER — TELEPHONE (OUTPATIENT)
Dept: UROLOGY | Facility: CLINIC | Age: 80
End: 2025-06-20
Payer: MEDICARE

## 2025-06-20 ENCOUNTER — TELEPHONE (OUTPATIENT)
Age: 80
End: 2025-06-20
Payer: MEDICARE

## 2025-06-20 NOTE — TELEPHONE ENCOUNTER
LVM TO RESCHEDULE APPT FOR AFTER 7/14 DUE TO HER NOT HAVING US DONE UNTIL 7/14 HUB OK TO SCHEDULE IF AVAIL

## 2025-06-20 NOTE — TELEPHONE ENCOUNTER
"Relay     \"LVM for Solange Riggs's appointment on 06/23/25 as imaging is not scheduled until 07/14/25. Ok to reschedule after imaging with Jade \"                 "

## 2025-06-20 NOTE — TELEPHONE ENCOUNTER
----- Message from Greer PERDOMO sent at 6/19/2025  3:46 PM EDT -----  PT has a follow up with Jade on Monday, she is supposed to have an US prior but her US is scheduled on 7/14/2025. Will you all move her appointment back until after her US?

## 2025-06-25 NOTE — PROGRESS NOTES
Sleep Clinic Follow Up Note    Chief Complaint  No chief complaint on file.    Subjective     History of Present Illness (from previous encounter on 11/21/2024 with Dr. Wing):  She was originally diagnosed with obstructive sleep apnea in approximately 2012 in [facility] to her best recollection.  She was placed on CPAP therapy and found this to be effective.  At the time she was having snoring, witnessed apneas, disturbed sleep, and daytime somnolence.     With CPAP therapy her sleep has become more restful.     She comes today accompanied by her son.  Her  has passed away.  She indicates to me that as long as she is on CPAP therapy that she feels well during the day.  She has some early onset dementia and has trouble remembering details.  She is using a nasal mask.     I do not have any records from [place].  She brought a CPAP machine with her which she said was hers but apparently it is either her 's or some type of replacement machine so no viable data was obtained from it.     Coram Scale is: 7/24 (End copied text).    Interval History:  Keely Basurto is a 79 y.o. female returns for follow up and compliance of PAP therapy. The patient was last seen on 11/21/2024 by Dr. Wing. Overall the patient feels good with regard to therapy. The device appears to be working appropriately. On average the patient sleeps 6 hours per night. The patient wakes 0 times per night.     The patient reports the following changes to their medical and medication history since they were last seen:  None    Further details are as follows:      Coram Scale is (out of 24): Total score: 5     Weight:  Current Weight: 143 lb      The patient's relevant past medical, surgical, family, and social history reviewed and updated in Epic as appropriate.    PMH:    Past Medical History:   Diagnosis Date    Anxiety     Back pain     Cataract     Cognitive impairment     mild    Constipation      Endometriosis     Hearing aid worn     History of fall 2021    fall on ice     HL (hearing loss)     Hyperlipidemia     Hypertension     Incontinence     Menorrhagia     Osteoarthritis of left hip     Osteopenia     Osteoporosis     Scoliosis     Seizures     Seizures     Sleep apnea     cpap machine    Urinary tract infection     Vitamin D deficiency      Past Surgical History:   Procedure Laterality Date    ABDOMINAL HYSTERECTOMY      KNEE ARTHROPLASTY Right 2021    OOPHORECTOMY      ORIF HUMERUS FRACTURE Left 2021    Procedure: HUMERUS PROXIMAL OPEN REDUCTION INTERNAL FIXATION LEFT;  Surgeon: Timoteo Loza MD;  Location[facility];  Service: Orthopedics;  Laterality: Left;    SHOULDER SURGERY      both shoulders following fall on ice-     TUMOR REMOVAL Left     LEFT OVARIAN TUMOR REMOVED     OB History          1    Para   1    Term   1            AB        Living             SAB        IAB        Ectopic        Molar        Multiple        Live Births   1              Allergies   Allergen Reactions    Sulfa Antibiotics Unknown - Low Severity       MEDS:  Prior to Admission medications    Medication Sig Start Date End Date Taking? Authorizing Provider   acetaminophen (TYLENOL) 325 MG tablet Take 2 tablets by mouth Every 6 (Six) Hours As Needed for Mild Pain . 21   Sanjana Alexis APRN   amLODIPine (NORVASC) 10 MG tablet Take 1 tablet by mouth Daily. 25   Anna Babin DO   cholecalciferol (Vitamin D, Cholecalciferol,) 25 MCG (1000 UT) tablet Take 1 tablet by mouth Daily. 25   Anna Babin DO   citalopram (CeleXA) 10 MG tablet Take 1 tablet by mouth Daily. 25   Anna Babin DO   Citrucel 500 MG tablet tablet TAKE TWO TABLETS '1000MG' BY MOUTH ONE TIME DAILY WITH 8 OZ OF WATER 25   Anna Babin DO   donepezil (ARICEPT) 10 MG tablet Take 1 tablet by mouth Daily. 24   Provider, MD Kezia   levETIRAcetam  "(KEPPRA) 1000 MG tablet Take 1 tablet by mouth 2 (Two) Times a Day. 11/25/24   ProviderKezia MD   levETIRAcetam (KEPPRA) 500 MG tablet Take 1 tablet by mouth Every Night.    ProviderKezia MD   losartan (COZAAR) 100 MG tablet Take 1 tablet by mouth Daily. 4/21/25   Anna Babin DO   memantine (NAMENDA) 10 MG tablet Take 1 tablet by mouth. 3/3/25   Kezia Clark MD   Mirabegron ER (Myrbetriq) 50 MG tablet sustained-release 24 hour 24 hr tablet Take 50 mg by mouth Daily. 4/21/25   Anna Babin DO   polyethylene glycol (MIRALAX) 17 g packet Take 17 g by mouth Daily. 2/19/21   Sanjana Alexis APRN   rosuvastatin (CRESTOR) 5 MG tablet Take 1 tablet by mouth Daily. 4/21/25   Anna Babin DO   vitamin B-12 (CYANOCOBALAMIN) 1000 MCG tablet Take 1 tablet by mouth Daily. 4/21/25   Anna Babin DO         FH:  Family History   Problem Relation Age of Onset    Lung cancer Father     Breast cancer Neg Hx     Ovarian cancer Neg Hx        Objective   Vital Signs:  /68 (BP Location: Left arm, Patient Position: Sitting, Cuff Size: Adult)   Pulse 76   Temp 95.5 °F (35.3 °C) (Temporal)   Ht 157.5 cm (62.01\")   Wt 65.2 kg (143 lb 12.8 oz)   SpO2 99%   BMI 26.29 kg/m²     Patient's (Body mass index is 26.29 kg/m².) indicates that they are overweight (BMI 25-29.9)          Physical Exam  Vitals reviewed.   Constitutional:       Appearance: Normal appearance.   HENT:      Head: Normocephalic and atraumatic.      Nose: Nose normal.      Mouth/Throat:      Mouth: Mucous membranes are moist.   Cardiovascular:      Rate and Rhythm: Normal rate and regular rhythm.      Heart sounds: No murmur heard.     No friction rub. No gallop.   Pulmonary:      Effort: Pulmonary effort is normal. No respiratory distress.      Breath sounds: Normal breath sounds. No wheezing or rhonchi.   Neurological:      Mental Status: She is alert and oriented to person, place, and time.   Psychiatric:         " Behavior: Behavior normal.               PAP Report:  AHI: 0.9/h  Days of Usage: 30/30 (100%)  Number of Days Greater than 4 hours: 100%  Average time (days used): 6 hours 30 minutes  95th Percentile Pressure: 10.9 cmH2O  95th percentile leaks: 11.0 L/min  Settings: Auto CPAP-6/16 cm H2O, EPR full-time, EPR level 1, response standard.       Assessment and Plan  Keely Basurto is a 79 y.o. female who returns for follow-up and compliance of PAP therapy.  The Pap report has been reviewed.  Overall usage and compliance are at 100%.  The patient averages 6 hours and 30 minutes of therapy.  Sleep apnea is well-controlled with an AHI of 0.9/H. Patient has a history of moderate obstructive sleep apnea with an AHI of 19.9/H obtained on in-lab polysomnography on 1/29/2025. I will refill the patient's supplies, and I have asked them to return for follow-up and compliance in 1 year or sooner should they have further questions or concerns.    Diagnoses and all orders for this visit:    1. KOJO on CPAP (Primary)  -     PAP Therapy    2. Overweight with body mass index (BMI) 25.0-29.9       The patient continues to use and benefit from PAP therapy.    1. The patient was counseled regarding multimodal approach with healthy nutrition, healthy sleep, regular physical activity, social activities, counseling, and medications. Encouraged to practice lateral sleep position. Avoid alcohol and sedatives close to bedtime.     2.  We will refill supplies x1 year.  Return to clinic 1 year or sooner if symptoms warrant. I have reviewed the results of my evaluation and impression and discussed my recommendations in detail with the patient.           Follow Up  Return in about 1 year (around 6/30/2026) for Annual visit.  Patient was given instructions and counseling regarding her condition or for health maintenance advice. Please see specific information pulled into the AVS if appropriate.       YOHANA Angel, B[facility] Cardon Children's Medical CenterP-BC  Pulmonology,  Critical Care, and Sleep Medicine

## 2025-06-30 ENCOUNTER — OFFICE VISIT (OUTPATIENT)
Dept: SLEEP MEDICINE | Age: 80
End: 2025-06-30
Payer: MEDICARE

## 2025-06-30 VITALS
HEART RATE: 76 BPM | OXYGEN SATURATION: 99 % | WEIGHT: 143.8 LBS | BODY MASS INDEX: 26.46 KG/M2 | HEIGHT: 62 IN | SYSTOLIC BLOOD PRESSURE: 110 MMHG | DIASTOLIC BLOOD PRESSURE: 68 MMHG | TEMPERATURE: 95.5 F

## 2025-06-30 DIAGNOSIS — E66.3 OVERWEIGHT WITH BODY MASS INDEX (BMI) 25.0-29.9: ICD-10-CM

## 2025-06-30 DIAGNOSIS — G47.33 OSA ON CPAP: Primary | ICD-10-CM

## 2025-07-14 ENCOUNTER — HOSPITAL ENCOUNTER (OUTPATIENT)
Dept: ULTRASOUND IMAGING | Facility: HOSPITAL | Age: 80
Discharge: HOME | End: 2025-07-14
Admitting: NURSE PRACTITIONER
Payer: MEDICARE

## 2025-07-14 DIAGNOSIS — D17.9 ANGIOMYOLIPOMA: ICD-10-CM

## 2025-07-14 PROCEDURE — 76775 US EXAM ABDO BACK WALL LIM: CPT

## (undated) DEVICE — GLV SURG SENSICARE PI ORTHO SZ7.5 LF STRL

## (undated) DEVICE — K-WIRE, Ø1.6 X L150MM
Type: IMPLANTABLE DEVICE | Site: ARM | Status: NON-FUNCTIONAL
Brand: K-WIRE, Ø1.6MM X L150MM
Removed: 2021-02-14

## (undated) DEVICE — PUMP PAIN AUTOFUSER AUTO SELCT NOBOLUS 1TO14ML/HR 550ML DISP

## (undated) DEVICE — INTENDED FOR TISSUE SEPARATION, AND OTHER PROCEDURES THAT REQUIRE A SHARP SURGICAL BLADE TO PUNCTURE OR CUT.: Brand: BARD-PARKER ® SAFETYLOCK CARBON RIB-BACK BLADES

## (undated) DEVICE — 2.5MM DRILL: Brand: 2.5MM DRILL

## (undated) DEVICE — UNDERGLV SURG BIOGEL INDICAT PI SZ8 BLU

## (undated) DEVICE — CVR HNDL LIGHT RIGID

## (undated) DEVICE — INTENT TO BE USED WITH SUTURE MATERIAL FOR TISSUE CLOSURE: Brand: RICHARD-ALLAN® NEEDLE 1/2 CIRCLE TAPER

## (undated) DEVICE — PK MAJ SHLDR SPLT 10

## (undated) DEVICE — 1010 S-DRAPE TOWEL DRAPE 10/BX: Brand: STERI-DRAPE™

## (undated) DEVICE — C-ARM: Brand: UNBRANDED

## (undated) DEVICE — ANTIBACTERIAL UNDYED BRAIDED (POLYGLACTIN 910), SYNTHETIC ABSORBABLE SUTURE: Brand: COATED VICRYL

## (undated) DEVICE — T-MAX DISPOSABLE FACE MASK 8 PER BOX

## (undated) DEVICE — SCRUBIN SCRUB BRUSH DRY STER: Brand: MEDLINE INDUSTRIES, INC.

## (undated) DEVICE — 3M™ STERI-DRAPE™ U-DRAPE 1015: Brand: STERI-DRAPE™

## (undated) DEVICE — SWABSTK SKINPREP PVPI PRE/SAT 8IN STRL

## (undated) DEVICE — SUT VIC 2/0 CT2 27IN J269H

## (undated) DEVICE — CANNULATED SCREW, Ø3.5MM X L48MM
Type: IMPLANTABLE DEVICE | Site: ARM | Status: NON-FUNCTIONAL
Brand: CANNULATED SCREW, 3.5MM
Removed: 2021-02-14

## (undated) DEVICE — ISO/ALC 70PCT 16OZ

## (undated) DEVICE — INTENDED FOR TISSUE SEPARATION, AND OTHER PROCEDURES THAT REQUIRE A SHARP SURGICAL BLADE TO PUNCTURE OR CUT.: Brand: BARD-PARKER ® CARBON RIB-BACK BLADES

## (undated) DEVICE — 450 ML BOTTLE OF 0.05% CHLORHEXIDINE GLUCONATE IN 99.95% STERILE WATER FOR IRRIGATION, USP AND APPLICATOR.: Brand: IRRISEPT ANTIMICROBIAL WOUND LAVAGE

## (undated) DEVICE — GOWN,NON-REINFORCED,SIRUS,SET IN SLV,XL: Brand: MEDLINE

## (undated) DEVICE — SUT ETHLN 3/0 PC5 18IN 1893G

## (undated) DEVICE — STERILE PVP: Brand: MEDLINE INDUSTRIES, INC.

## (undated) DEVICE — CORTICAL SCREW, Ø3.5MM X L30MM
Type: IMPLANTABLE DEVICE | Site: ARM | Status: NON-FUNCTIONAL
Brand: CORTICAL SCREW, 3.5MM
Removed: 2021-02-14

## (undated) DEVICE — DRAPE,TOP,102X53,STERILE: Brand: MEDLINE

## (undated) DEVICE — LP VESL MAXI 2.5X1MM BLU 2PK

## (undated) DEVICE — CANNULATED SCREW, Ø3.5MM X L45MM
Type: IMPLANTABLE DEVICE | Site: ARM | Status: NON-FUNCTIONAL
Brand: CANNULATED SCREW, 3.5MM
Removed: 2021-02-14